# Patient Record
Sex: FEMALE | Race: WHITE | NOT HISPANIC OR LATINO | Employment: OTHER | URBAN - METROPOLITAN AREA
[De-identification: names, ages, dates, MRNs, and addresses within clinical notes are randomized per-mention and may not be internally consistent; named-entity substitution may affect disease eponyms.]

---

## 2018-07-19 ENCOUNTER — OFFICE VISIT (OUTPATIENT)
Dept: ORTHOPEDICS | Facility: CLINIC | Age: 68
End: 2018-07-19
Payer: MEDICARE

## 2018-07-19 VITALS
DIASTOLIC BLOOD PRESSURE: 70 MMHG | HEIGHT: 66 IN | WEIGHT: 152 LBS | SYSTOLIC BLOOD PRESSURE: 123 MMHG | HEART RATE: 78 BPM | BODY MASS INDEX: 24.43 KG/M2

## 2018-07-19 DIAGNOSIS — M75.41 IMPINGEMENT SYNDROME OF RIGHT SHOULDER: Primary | ICD-10-CM

## 2018-07-19 DIAGNOSIS — M17.12 PRIMARY OSTEOARTHRITIS OF LEFT KNEE: ICD-10-CM

## 2018-07-19 PROCEDURE — 73030 X-RAY EXAM OF SHOULDER: CPT | Mod: RT,,, | Performed by: ORTHOPAEDIC SURGERY

## 2018-07-19 PROCEDURE — 99213 OFFICE O/P EST LOW 20 MIN: CPT | Mod: 25,,, | Performed by: ORTHOPAEDIC SURGERY

## 2018-07-19 PROCEDURE — 20610 DRAIN/INJ JOINT/BURSA W/O US: CPT | Mod: 51,LT,, | Performed by: ORTHOPAEDIC SURGERY

## 2018-07-19 RX ORDER — METHYLPREDNISOLONE ACETATE 40 MG/ML
40 INJECTION, SUSPENSION INTRA-ARTICULAR; INTRALESIONAL; INTRAMUSCULAR; SOFT TISSUE
Status: DISCONTINUED | OUTPATIENT
Start: 2018-07-19 | End: 2018-07-19 | Stop reason: HOSPADM

## 2018-07-19 RX ORDER — LEVOTHYROXINE SODIUM 88 UG/1
TABLET ORAL
COMMUNITY
End: 2018-10-11 | Stop reason: SDUPTHER

## 2018-07-19 RX ORDER — ATORVASTATIN CALCIUM 20 MG/1
20 TABLET, FILM COATED ORAL DAILY
Refills: 1 | COMMUNITY
Start: 2018-07-15 | End: 2018-10-11 | Stop reason: SDUPTHER

## 2018-07-19 RX ADMIN — METHYLPREDNISOLONE ACETATE 40 MG: 40 INJECTION, SUSPENSION INTRA-ARTICULAR; INTRALESIONAL; INTRAMUSCULAR; SOFT TISSUE at 02:07

## 2018-07-19 NOTE — PROGRESS NOTES
[facility]    Subjective:     Chief Complaint:   Chief Complaint   Patient presents with    Left Knee - Pain     Left knee injection follow up. States that the injection did help and that the last few weeks the pain started to get worse again. States that her pain is 3      Right Shoulder - Pain     Right shoulder pain x 6 months. States that it bothers her when she goes to reach above her head and rotate outwards. States that her pain is a 4-5 when she has It       Past Medical History:   Diagnosis Date    Hypercholesterolemia     Thyroid disease        Past Surgical History:   Procedure Laterality Date    APPENDECTOMY      breast augmentation      HYSTERECTOMY         Current Outpatient Prescriptions   Medication Sig    atorvastatin (LIPITOR) 20 MG tablet Take 20 mg by mouth once daily.    levothyroxine (SYNTHROID) 88 MCG tablet levothyroxine 88 mcg tablet     No current facility-administered medications for this visit.        Review of patient's allergies indicates:  Allergies no known allergies    History reviewed. No pertinent family history.    Social History     Social History    Marital status:      Spouse name: N/A    Number of children: N/A    Years of education: N/A     Occupational History    Not on file.     Social History Main Topics    Smoking status: Never Smoker    Smokeless tobacco: Not on file    Alcohol use Yes    Drug use: Unknown    Sexual activity: Not on file     Other Topics Concern    Not on file     Social History Narrative    No narrative on file       History of present illness: Patient comes in today for the left knee. She was comes in for the right shoulder. Both been bothering her for long time. Right shoulders been bothering her for over a year.      Review of Systems:    Constitution: Negative for chills, fever, and sweats.  Negative for unexplained weight loss.    HENT:  Negative for headaches and blurry vision.    Cardiovascular:Negative for  chest pain or irregular heart beat. Negative for hypertension.    Respiratory:  Negative for cough and shortness of breath.    Gastrointestinal: Negative for abdominal pain, heartburn, melena, nausea, and vomitting.    Genitourinary:  Negative bladder incontinence and dysuria.    Musculoskeletal:  See HPI for details.     Neurological: Negative for numbness.    Psychiatric/Behavioral: Negative for depression.  The patient is not nervous/anxious.      Endocrine: Negative for polyuria    Hematologic/Lymphatic: Negative for bleeding problem.  Does not bruise/bleed easily.    Skin: Negative for poor would healing and rash    Objective:      Physical Examination:    Vital Signs:    Vitals:    07/19/18 1407   BP: 123/70   Pulse: 78       Body mass index is 24.53 kg/m².    This a well-developed, well nourished patient in no acute distress.  They are alert and oriented and cooperative to examination.        Patient has positive impingement on the right time. Negative crossover. Her rotator cuff is grossly intact but very tender. It impingement on the left shoulder. Her rotator cuff is intact. Spurling sign is negative. Full range of motion of the left knee. Anterior crepitus. 1+ effusion.  Pertinent New Results:    XRAY Report / Interpretation:   AP and lateral of the right shoulder demonstrates a type II acromion.    Assessment/Plan:      Subacromial impingement. I injected the right shoulder with Depo-Medrol and lidocaine. Started her on a strengthening program. In terms of the left knee I injected the left knee with Depo-Medrol and lidocaine. She has osteoarthritis of the left knee. She is an excellent candidate for Visco supplementation try to get Synvisc approved for her. We will see her back for that injection      This note was created using Dragon voice recognition software that occasionally misinterpreted phrases or words.

## 2018-07-19 NOTE — PROCEDURES
Large Joint Aspiration/Injection  Date/Time: 7/19/2018 2:31 PM  Performed by: JANIA CORDERO  Authorized by: JANIA CORDERO     Consent Done?:  Yes (Verbal)  Indications:  Pain  Procedure site marked: Yes    Timeout: Prior to procedure the correct patient, procedure, and site was verified      Location:  Knee  Site:  L knee  Prep: Patient was prepped and draped in usual sterile fashion    Needle size:  25 G  Medications:  40 mg methylPREDNISolone acetate 40 mg/mL; 40 mg methylPREDNISolone acetate 40 mg/mL  Patient tolerance:  Patient tolerated the procedure well with no immediate complications  Large Joint Aspiration/Injection  Date/Time: 7/19/2018 2:32 PM  Performed by: JANIA CORDERO  Authorized by: JANIA CORDERO     Consent Done?:  Yes (Verbal)  Indications:  Pain  Procedure site marked: Yes    Timeout: Prior to procedure the correct patient, procedure, and site was verified      Location:  Shoulder  Site:  R subacromial bursa  Prep: Patient was prepped and draped in usual sterile fashion    Needle size:  25 G  Medications:  40 mg methylPREDNISolone acetate 40 mg/mL; 40 mg methylPREDNISolone acetate 40 mg/mL  Patient tolerance:  Patient tolerated the procedure well with no immediate complications

## 2018-07-31 ENCOUNTER — TELEPHONE (OUTPATIENT)
Dept: ORTHOPEDICS | Facility: CLINIC | Age: 68
End: 2018-07-31

## 2018-07-31 NOTE — TELEPHONE ENCOUNTER
----- Message from Marquis Ray sent at 7/31/2018  9:53 AM CDT -----  Patient called to see if she has been approved for synvisc, please call patient when you get a chance

## 2018-08-03 ENCOUNTER — TELEPHONE (OUTPATIENT)
Dept: ORTHOPEDICS | Facility: CLINIC | Age: 68
End: 2018-08-03

## 2018-08-03 NOTE — TELEPHONE ENCOUNTER
----- Message from Sachi Ramon sent at 8/2/2018  8:42 AM CDT -----  Contact: Osorio with [facility] [phone]  Osorio sent fax [phone]) requesting info for Synvisc injection.

## 2018-08-09 ENCOUNTER — OFFICE VISIT (OUTPATIENT)
Dept: ORTHOPEDICS | Facility: CLINIC | Age: 68
End: 2018-08-09
Payer: MEDICARE

## 2018-08-09 VITALS
DIASTOLIC BLOOD PRESSURE: 60 MMHG | WEIGHT: 153 LBS | HEIGHT: 66 IN | BODY MASS INDEX: 24.59 KG/M2 | SYSTOLIC BLOOD PRESSURE: 110 MMHG | HEART RATE: 71 BPM

## 2018-08-09 DIAGNOSIS — M17.12 PRIMARY OSTEOARTHRITIS OF LEFT KNEE: Primary | ICD-10-CM

## 2018-08-09 PROCEDURE — 20610 DRAIN/INJ JOINT/BURSA W/O US: CPT | Mod: LT,,, | Performed by: ORTHOPAEDIC SURGERY

## 2018-08-09 PROCEDURE — 99213 OFFICE O/P EST LOW 20 MIN: CPT | Mod: 25,,, | Performed by: ORTHOPAEDIC SURGERY

## 2018-08-09 NOTE — PROGRESS NOTES
[facility]    Subjective:     Chief Complaint:   Chief Complaint   Patient presents with    Left Knee - Pain     Here for Left Knee Synvisc injection today       Past Medical History:   Diagnosis Date    Hypercholesterolemia     Thyroid disease        Past Surgical History:   Procedure Laterality Date    APPENDECTOMY      breast augmentation      HYSTERECTOMY         Current Outpatient Prescriptions   Medication Sig    atorvastatin (LIPITOR) 20 MG tablet Take 20 mg by mouth once daily.    levothyroxine (SYNTHROID) 88 MCG tablet levothyroxine 88 mcg tablet     No current facility-administered medications for this visit.        Review of patient's allergies indicates:  No Known Allergies    History reviewed. No pertinent family history.    Social History     Social History    Marital status:      Spouse name: N/A    Number of children: N/A    Years of education: N/A     Occupational History    Not on file.     Social History Main Topics    Smoking status: Never Smoker    Smokeless tobacco: Not on file    Alcohol use Yes    Drug use: Unknown    Sexual activity: Not on file     Other Topics Concern    Not on file     Social History Narrative    No narrative on file       History of present illness: Patient returns today for her left knee. She is here for Synvisc one.      Review of Systems:    Constitution: Negative for chills, fever, and sweats.  Negative for unexplained weight loss.    HENT:  Negative for headaches and blurry vision.    Cardiovascular:Negative for chest pain or irregular heart beat. Negative for hypertension.    Respiratory:  Negative for cough and shortness of breath.    Gastrointestinal: Negative for abdominal pain, heartburn, melena, nausea, and vomitting.    Genitourinary:  Negative bladder incontinence and dysuria.    Musculoskeletal:  See HPI for details.     Neurological: Negative for numbness.    Psychiatric/Behavioral: Negative for depression.  The  patient is not nervous/anxious.      Endocrine: Negative for polyuria    Hematologic/Lymphatic: Negative for bleeding problem.  Does not bruise/bleed easily.    Skin: Negative for poor would healing and rash    Objective:      Physical Examination:    Vital Signs:    Vitals:    08/09/18 1332   BP: 110/60   Pulse: 71       Body mass index is 24.69 kg/m².    This a well-developed, well nourished patient in no acute distress.  They are alert and oriented and cooperative to examination.        Patient has no crepitus. She has no effusion. She has full range of motion of the left and right knee  Pertinent New Results:    XRAY Report / Interpretation:   No new XRAYS Today.    Assessment/Plan:      Osteoarthritis of the left knee. Synvisc 1 is given without difficulty or complaints. Cautions and precautions are discussed with the patient in extensive detail. She will follow-up when necessary      This note was created using Dragon voice recognition software that occasionally misinterpreted phrases or words.

## 2018-08-09 NOTE — PROCEDURES
Large Joint Aspiration/Injection  Date/Time: 8/9/2018 1:43 PM  Performed by: JANIA CORDERO  Authorized by: JANIA CORDERO     Consent Done?:  Yes (Verbal)  Indications:  Pain  Procedure site marked: Yes    Timeout: Prior to procedure the correct patient, procedure, and site was verified      Location:  Knee  Site:  L knee  Prep: Patient was prepped and draped in usual sterile fashion    Ultrasonic Guidance for needle placement: No  Needle size:  25 G  Medications:  48 mg hylan g-f 20 48 mg/6 mL  Patient tolerance:  Patient tolerated the procedure well with no immediate complications

## 2018-08-14 RX ORDER — METHYLPREDNISOLONE ACETATE 40 MG/ML
40 INJECTION, SUSPENSION INTRA-ARTICULAR; INTRALESIONAL; INTRAMUSCULAR; SOFT TISSUE
Status: COMPLETED | OUTPATIENT
Start: 2018-08-14 | End: 2018-08-14

## 2018-08-14 RX ADMIN — METHYLPREDNISOLONE ACETATE 40 MG: 40 INJECTION, SUSPENSION INTRA-ARTICULAR; INTRALESIONAL; INTRAMUSCULAR; SOFT TISSUE at 10:08

## 2018-12-12 ENCOUNTER — TELEPHONE (OUTPATIENT)
Dept: ORTHOPEDICS | Facility: CLINIC | Age: 68
End: 2018-12-12

## 2018-12-12 NOTE — TELEPHONE ENCOUNTER
Spoke with pt, she wanted to know if Dr. Gunter and Dr. Doyle would do two surgeries on her at the same time. I explained that Dr Gunter has done this but she would have to speak with Dr. Gunter when she comes in next week and see if we can work that out for her.

## 2018-12-20 ENCOUNTER — OFFICE VISIT (OUTPATIENT)
Dept: ORTHOPEDICS | Facility: CLINIC | Age: 68
End: 2018-12-20
Payer: MEDICARE

## 2018-12-20 VITALS
BODY MASS INDEX: 24.59 KG/M2 | WEIGHT: 153 LBS | HEART RATE: 82 BPM | DIASTOLIC BLOOD PRESSURE: 70 MMHG | SYSTOLIC BLOOD PRESSURE: 108 MMHG | HEIGHT: 66 IN

## 2018-12-20 DIAGNOSIS — M17.12 PRIMARY OSTEOARTHRITIS OF LEFT KNEE: Primary | ICD-10-CM

## 2018-12-20 PROCEDURE — 1101F PT FALLS ASSESS-DOCD LE1/YR: CPT | Mod: ,,, | Performed by: ORTHOPAEDIC SURGERY

## 2018-12-20 PROCEDURE — 99213 OFFICE O/P EST LOW 20 MIN: CPT | Mod: 25,,, | Performed by: ORTHOPAEDIC SURGERY

## 2018-12-20 PROCEDURE — 20610 DRAIN/INJ JOINT/BURSA W/O US: CPT | Mod: LT,,, | Performed by: ORTHOPAEDIC SURGERY

## 2018-12-20 PROCEDURE — 73562 X-RAY EXAM OF KNEE 3: CPT | Mod: LT,,, | Performed by: ORTHOPAEDIC SURGERY

## 2018-12-20 RX ORDER — METHYLPREDNISOLONE ACETATE 40 MG/ML
40 INJECTION, SUSPENSION INTRA-ARTICULAR; INTRALESIONAL; INTRAMUSCULAR; SOFT TISSUE
Status: DISCONTINUED | OUTPATIENT
Start: 2018-12-20 | End: 2018-12-20 | Stop reason: HOSPADM

## 2018-12-20 RX ADMIN — METHYLPREDNISOLONE ACETATE 40 MG: 40 INJECTION, SUSPENSION INTRA-ARTICULAR; INTRALESIONAL; INTRAMUSCULAR; SOFT TISSUE at 12:12

## 2018-12-20 NOTE — PROGRESS NOTES
[facility]    Subjective:     Chief Complaint:   Chief Complaint   Patient presents with    Left Knee - Pain     Left knee Synvisc  injection  8-9. She is here for another injection       Past Medical History:   Diagnosis Date    Hypercholesterolemia     Thyroid disease        Past Surgical History:   Procedure Laterality Date    APPENDECTOMY      breast augmentation      HYSTERECTOMY         Current Outpatient Medications   Medication Sig    atorvastatin (LIPITOR) 20 MG tablet TAKE 1 TABLET EVERY DAY    levothyroxine (SYNTHROID) 88 MCG tablet TAKE 1 TABLET EVERY DAY     No current facility-administered medications for this visit.        Review of patient's allergies indicates:  No Known Allergies    History reviewed. No pertinent family history.    Social History     Socioeconomic History    Marital status:      Spouse name: Not on file    Number of children: Not on file    Years of education: Not on file    Highest education level: Not on file   Social Needs    Financial resource strain: Not on file    Food insecurity - worry: Not on file    Food insecurity - inability: Not on file    Transportation needs - medical: Not on file    Transportation needs - non-medical: Not on file   Occupational History    Not on file   Tobacco Use    Smoking status: Never Smoker   Substance and Sexual Activity    Alcohol use: Yes    Drug use: Not on file    Sexual activity: Not on file   Other Topics Concern    Not on file   Social History Narrative    Not on file       History of present illness: Patient comes in today for the left knee. She's having achy pain in the left knee.      Review of Systems:    Constitution: Negative for chills, fever, and sweats.  Negative for unexplained weight loss.    HENT:  Negative for headaches and blurry vision.    Cardiovascular:Negative for chest pain or irregular heart beat. Negative for hypertension.    Respiratory:  Negative for cough and shortness  of breath.    Gastrointestinal: Negative for abdominal pain, heartburn, melena, nausea, and vomitting.    Genitourinary:  Negative bladder incontinence and dysuria.    Musculoskeletal:  See HPI for details.     Neurological: Negative for numbness.    Psychiatric/Behavioral: Negative for depression.  The patient is not nervous/anxious.      Endocrine: Negative for polyuria    Hematologic/Lymphatic: Negative for bleeding problem.  Does not bruise/bleed easily.    Skin: Negative for poor would healing and rash    Objective:      Physical Examination:    Vital Signs:    Vitals:    12/20/18 1126   BP: 108/70   Pulse: 82       Body mass index is 24.69 kg/m².    This a well-developed, well nourished patient in no acute distress.  They are alert and oriented and cooperative to examination.        Patient has anterior crepitus. She has 1+ effusion. She is medial joint line tenderness. She is no crepitus on the left side.  Pertinent New Results:    XRAY Report / Interpretation:   AP lateral and sunrise views of the left knee demonstrates 3 compartment spurring. Moderate loss of medial compartment    Assessment/Plan:      Osteoarthrosis left knee. I injected the left knee with Depo-Medrol and lidocaine. She will follow-up when necessary  This note was created using Dragon voice recognition software that occasionally misinterpreted phrases or words.

## 2018-12-26 LAB
ALBUMIN SERPL-MCNC: 3.6 G/DL (ref 3.1–4.7)
ALP SERPL-CCNC: 58 IU/L (ref 40–104)
ALT (SGPT): 18 IU/L (ref 3–33)
AST SERPL-CCNC: 22 IU/L (ref 10–40)
BILIRUB SERPL-MCNC: 0.6 MG/DL (ref 0.3–1)
BUN SERPL-MCNC: 22 MG/DL (ref 8–20)
CALCIUM SERPL-MCNC: 9.5 MG/DL (ref 7.7–10.4)
CHLORIDE: 105 MMOL/L (ref 98–110)
CO2 SERPL-SCNC: 29.3 MMOL/L (ref 22.8–31.6)
CREATININE: 0.65 MG/DL (ref 0.6–1.4)
GLUCOSE: 97 MG/DL (ref 70–99)
HCT VFR BLD AUTO: 40.9 % (ref 36–48)
HGB BLD-MCNC: 12.9 G/DL (ref 12–15)
MCH RBC QN AUTO: 27.6 PG (ref 25–35)
MCHC RBC AUTO-ENTMCNC: 31.5 G/DL (ref 31–36)
MCV RBC AUTO: 87.4 FL (ref 79–98)
NUCLEATED RBCS: 0 %
PLATELET # BLD AUTO: 249 K/UL (ref 140–440)
POTASSIUM SERPL-SCNC: 4.1 MMOL/L (ref 3.5–5)
PROT SERPL-MCNC: 6.9 G/DL (ref 6–8.2)
RBC # BLD AUTO: 4.68 M/UL (ref 3.5–5.5)
SODIUM: 141 MMOL/L (ref 134–144)
WBC # BLD AUTO: 9 K/UL (ref 5–10)

## 2019-01-08 ENCOUNTER — TELEPHONE (OUTPATIENT)
Dept: PODIATRY | Facility: CLINIC | Age: 69
End: 2019-01-08

## 2019-01-15 ENCOUNTER — OFFICE VISIT (OUTPATIENT)
Dept: PODIATRY | Facility: CLINIC | Age: 69
End: 2019-01-15
Payer: MEDICARE

## 2019-01-15 VITALS
HEART RATE: 77 BPM | TEMPERATURE: 99 F | HEIGHT: 66 IN | SYSTOLIC BLOOD PRESSURE: 128 MMHG | DIASTOLIC BLOOD PRESSURE: 67 MMHG | WEIGHT: 153 LBS | BODY MASS INDEX: 24.59 KG/M2

## 2019-01-15 DIAGNOSIS — M20.22 HALLUX RIGIDUS OF LEFT FOOT: Primary | ICD-10-CM

## 2019-01-15 PROCEDURE — 99024 PR POST-OP FOLLOW-UP VISIT: ICD-10-PCS | Mod: ,,, | Performed by: PODIATRIST

## 2019-01-15 PROCEDURE — 99024 POSTOP FOLLOW-UP VISIT: CPT | Mod: ,,, | Performed by: PODIATRIST

## 2019-01-15 NOTE — PROGRESS NOTES
[address] [facility] [address]  [place] [address]  Phone: [phone]   [phone]    Patient's PCP:Rubens Tinajero III, MD  Referring Provider:No ref. provider found    Subjective:      Chief Complaint: Post-Op    Date of Surgery: 01/09/2019  Procedure: Cheilectomy first metatarsal phalangeal joint left foot    HPI:   Olivia Mix is a 69 y.o. female who returns to the clinic today for her 1st postop visit.  Olivia Mix rates pain a 3/10 on a pain scale.   Vitals:    01/15/19 1418   BP: 128/67   Pulse: 77   Temp: 98.6 °F (37 °C)       Past Surgical History:   Procedure Laterality Date    APPENDECTOMY      breast augmentation      CHEILECTOMY      HYSTERECTOMY       Past Medical History:   Diagnosis Date    Hypercholesterolemia     Thyroid disease      History reviewed. No pertinent family history.     Social History:   Marital Status:   Alcohol History:  reports that she drinks alcohol.  Tobacco History:  reports that  has never smoked. She does not have any smokeless tobacco history on file.  Drug History:  has no drug history on file.    Review of patient's allergies indicates:  No Known Allergies    Current Outpatient Medications   Medication Sig Dispense Refill    atorvastatin (LIPITOR) 20 MG tablet TAKE 1 TABLET EVERY DAY 90 tablet 1    levothyroxine (SYNTHROID) 88 MCG tablet TAKE 1 TABLET EVERY DAY 90 tablet 1     No current facility-administered medications for this visit.        Review of Systems   Constitutional: Positive for unexpected weight change. Negative for chills, fatigue and fever.   HENT: Negative for hearing loss and trouble swallowing.    Eyes: Negative for photophobia and visual disturbance.   Respiratory: Negative for cough, shortness of breath and wheezing.    Cardiovascular: Negative for chest pain, palpitations and leg swelling.   Gastrointestinal: Negative for abdominal pain and nausea.   Genitourinary: Negative for dysuria and frequency.   Musculoskeletal:  Positive for arthralgias (arthritis in knee). Negative for back pain and joint swelling.   Skin: Negative for rash.   Neurological: Negative for tremors, seizures, weakness, numbness and headaches.   Hematological: Bruises/bleeds easily.         Objective:        Post-op surgery of the cheilectomy of the left first MPJ with normal healing, no signs of infection or dehiscence of wound. Hardware in place. Normal post op exam today. No redness, no drainage, no increase in local temperature, no significant swelling, sutures.steri-strips are intact.     Imaging:       Ortho/SPM Exam  Physical Exam        Assessment and Plan:     6 days status post cheilectomy left first MPJ with normal healing    Plan:  #1 surgical redress done today.  The surgical dressing was removed showing no signs of infection, excess edema or malalignment. A new dry dressing was applied and patient was instructed to leave dressing intact until next visit or until instructed to remove.   #2 continue ice and elevation gradually increase activity level. Ambulate with surgical shoe.  #3 return to see me in 7-9 days for Steri-Strip removal.        The surgical dressing was removed showing no signs of infection, excess edema or malalignment. A new dry dressing was applied and patient was instructed to leave dressing intact until next visit or until instructed to remove.     This note was created using Dragon voice recognition software that occasionally misinterpreted phrases or words.

## 2019-01-22 ENCOUNTER — OFFICE VISIT (OUTPATIENT)
Dept: PODIATRY | Facility: CLINIC | Age: 69
End: 2019-01-22
Payer: MEDICARE

## 2019-01-22 VITALS
DIASTOLIC BLOOD PRESSURE: 70 MMHG | HEART RATE: 73 BPM | BODY MASS INDEX: 24.59 KG/M2 | WEIGHT: 153 LBS | SYSTOLIC BLOOD PRESSURE: 147 MMHG | HEIGHT: 66 IN | TEMPERATURE: 98 F

## 2019-01-22 DIAGNOSIS — M20.22 HALLUX RIGIDUS, LEFT FOOT: Primary | ICD-10-CM

## 2019-01-22 PROCEDURE — 99024 POSTOP FOLLOW-UP VISIT: CPT | Mod: ,,, | Performed by: PODIATRIST

## 2019-01-22 PROCEDURE — 99024 PR POST-OP FOLLOW-UP VISIT: ICD-10-PCS | Mod: ,,, | Performed by: PODIATRIST

## 2019-01-22 NOTE — PROGRESS NOTES
[address] [facility] [address] [place] [address]  Phone: [phone]   [phone]    Patient's PCP:Rubens Tinajero III, MD  Referring Provider:No ref. provider found    Subjective:      Chief Complaint: Post-Op    Date of Surgery: 1-09-19  Procedure: Cheilectomy first MPJ left    HPI:   Olivia Mix is a 69 y.o. female who returns to the clinic today for her 2nd postop visit.  Olivia Mix rates pain a 0/10 on a pain scale.      Vitals:    01/22/19 0932   BP: (!) 147/70   Pulse: 73   Temp: 98.1 °F (36.7 °C)       Past Surgical History:   Procedure Laterality Date    APPENDECTOMY      breast augmentation      CHEILECTOMY      HYSTERECTOMY       Past Medical History:   Diagnosis Date    Hypercholesterolemia     Thyroid disease      History reviewed. No pertinent family history.     Social History:   Marital Status:   Alcohol History:  reports that she drinks alcohol.  Tobacco History:  reports that  has never smoked. She does not have any smokeless tobacco history on file.  Drug History:  has no drug history on file.    Review of patient's allergies indicates:  No Known Allergies    Current Outpatient Medications   Medication Sig Dispense Refill    atorvastatin (LIPITOR) 20 MG tablet TAKE 1 TABLET EVERY DAY 90 tablet 1    levothyroxine (SYNTHROID) 88 MCG tablet TAKE 1 TABLET EVERY DAY 90 tablet 1     No current facility-administered medications for this visit.        Review of Systems   Constitutional: Negative for chills, fatigue, fever and unexpected weight change.   HENT: Negative for hearing loss and trouble swallowing.    Eyes: Negative for photophobia and visual disturbance.   Respiratory: Negative for cough, shortness of breath and wheezing.    Cardiovascular: Negative for chest pain, palpitations and leg swelling.   Gastrointestinal: Negative for abdominal pain and nausea.   Genitourinary: Negative for dysuria and frequency.   Musculoskeletal: Negative for arthralgias, back pain and  joint swelling.   Skin: Negative for rash.   Neurological: Negative for tremors, seizures, weakness, numbness and headaches.   Hematological: Does not bruise/bleed easily.         Objective:        Post-op surgery of the 1st mpj left foot with normal healing, no signs of infection or dehiscence of wound. Hardware in place. Normal post op exam today. No redness, no drainage, no increase in local temperature, no significant swelling, sutures.steri-strips are intact.     Imaging:     Ortho/SPM Exam  Physical Exam       Assessment:       1. Hallux rigidus, left foot      Plan:   Hallux rigidus, left foot    The foot was evaluated today, Steri-Strips removed, patient continue surgical shoe for one more week and then transitioned the jogging shoe work on range of motion of first MPJ.  Follow-up in about 4 weeks (around 2/19/2019) for X-ray.    Procedures - None        This note was created using Dragon voice recognition software that occasionally misinterpreted phrases or words.

## 2019-02-15 ENCOUNTER — TELEPHONE (OUTPATIENT)
Dept: ORTHOPEDICS | Facility: CLINIC | Age: 69
End: 2019-02-15

## 2019-02-15 DIAGNOSIS — M17.12 PRIMARY OSTEOARTHRITIS OF LEFT KNEE: Primary | ICD-10-CM

## 2019-02-15 NOTE — TELEPHONE ENCOUNTER
Pt called she is ready to have the synvisc injection so when you get the approval please call her to schedule the appt pts# [phone]

## 2019-02-21 ENCOUNTER — TELEPHONE (OUTPATIENT)
Dept: ORTHOPEDICS | Facility: CLINIC | Age: 69
End: 2019-02-21

## 2019-02-21 ENCOUNTER — OFFICE VISIT (OUTPATIENT)
Dept: PODIATRY | Facility: CLINIC | Age: 69
End: 2019-02-21
Payer: MEDICARE

## 2019-02-21 VITALS
TEMPERATURE: 99 F | DIASTOLIC BLOOD PRESSURE: 84 MMHG | HEART RATE: 79 BPM | BODY MASS INDEX: 24.59 KG/M2 | HEIGHT: 66 IN | WEIGHT: 153 LBS | SYSTOLIC BLOOD PRESSURE: 134 MMHG

## 2019-02-21 DIAGNOSIS — M79.672 LEFT FOOT PAIN: ICD-10-CM

## 2019-02-21 DIAGNOSIS — M20.22 HALLUX RIGIDUS OF LEFT FOOT: Primary | ICD-10-CM

## 2019-02-21 PROCEDURE — 99024 PR POST-OP FOLLOW-UP VISIT: ICD-10-PCS | Mod: ,,, | Performed by: PODIATRIST

## 2019-02-21 PROCEDURE — 99024 POSTOP FOLLOW-UP VISIT: CPT | Mod: ,,, | Performed by: PODIATRIST

## 2019-02-21 NOTE — PROGRESS NOTES
[address] [facility] [address] [place] [address]  Phone: [phone]   [phone]    Patient's PCP:Rubens Tinajero III, MD  Referring Provider:No ref. provider found    Subjective:      Chief Complaint: Post-Op    Date of Surgery: 1/9/19 6 weeks ago  Procedure: Cheilectomy left first mpj    HPI:   Olivia Mix is a 69 y.o. female who returns to the clinic today for her 3rd postop visit.  Olivia Mix rates pain a 4/10 on a pain scale. Compliance of Care:  None    Vitals:    02/21/19 1343   BP: 134/84   Pulse: 79   Temp: 98.5 °F (36.9 °C)       Past Surgical History:   Procedure Laterality Date    APPENDECTOMY      breast augmentation      CHEILECTOMY      HYSTERECTOMY       Past Medical History:   Diagnosis Date    Hypercholesterolemia     Thyroid disease      History reviewed. No pertinent family history.     Social History:   Marital Status:   Alcohol History:  reports that she drinks alcohol.  Tobacco History:  reports that  has never smoked. She does not have any smokeless tobacco history on file.  Drug History:  has no drug history on file.    Review of patient's allergies indicates:  No Known Allergies    Current Outpatient Medications   Medication Sig Dispense Refill    atorvastatin (LIPITOR) 20 MG tablet TAKE 1 TABLET EVERY DAY 90 tablet 1    levothyroxine (SYNTHROID) 88 MCG tablet TAKE 1 TABLET EVERY DAY 90 tablet 1     No current facility-administered medications for this visit.        Review of Systems   Constitutional: Negative for chills, fatigue, fever and unexpected weight change.   HENT: Negative for hearing loss and trouble swallowing.    Eyes: Negative for photophobia and visual disturbance.   Respiratory: Negative for cough, shortness of breath and wheezing.    Cardiovascular: Negative for chest pain, palpitations and leg swelling.   Gastrointestinal: Negative for abdominal pain and nausea.   Genitourinary: Negative for dysuria and frequency.   Musculoskeletal: Positive  for arthralgias. Negative for back pain and joint swelling.   Skin: Negative for rash.   Neurological: Negative for tremors, seizures, weakness, numbness and headaches.   Hematological: Does not bruise/bleed easily.         Objective:        Post-op surgery of the cheilectomy left foot with normal healing, no signs of infection or dehiscence of wound. Hardware in place. Normal post op exam today. No redness, no drainage, no increase in local temperature, no significant swelling, sutures.steri-strips are intact.     Imaging:     Physical Exam:   Foot Exam  Physical Exam       Assessment:       1. Hallux rigidus of left foot    2. Left foot pain      Plan:   Hallux rigidus of left foot  -     X-Ray Foot Complete Left    Left foot pain  -     X-Ray Foot Complete Left    Gradually increase activity level as tolerated and patient is discharged today and will she has a problem.  No Follow-up on file.    Procedures - None    The surgical dressing was removed showing no signs of infection, excess edema or malalignment. A new dry dressing was applied and patient was instructed to leave dressing intact until next visit or until instructed to remove.     This note was created using Dragon voice recognition software that occasionally misinterpreted phrases or words.

## 2019-03-07 ENCOUNTER — OFFICE VISIT (OUTPATIENT)
Dept: ORTHOPEDICS | Facility: CLINIC | Age: 69
End: 2019-03-07
Payer: MEDICARE

## 2019-03-07 VITALS
WEIGHT: 153 LBS | HEART RATE: 71 BPM | BODY MASS INDEX: 24.59 KG/M2 | SYSTOLIC BLOOD PRESSURE: 110 MMHG | DIASTOLIC BLOOD PRESSURE: 70 MMHG | HEIGHT: 66 IN

## 2019-03-07 DIAGNOSIS — M16.12 PRIMARY OSTEOARTHRITIS OF LEFT HIP: Primary | ICD-10-CM

## 2019-03-07 DIAGNOSIS — M70.62 GREATER TROCHANTERIC BURSITIS OF LEFT HIP: ICD-10-CM

## 2019-03-07 DIAGNOSIS — M17.12 PRIMARY OSTEOARTHRITIS OF LEFT KNEE: ICD-10-CM

## 2019-03-07 PROCEDURE — 1101F PT FALLS ASSESS-DOCD LE1/YR: CPT | Mod: ,,, | Performed by: ORTHOPAEDIC SURGERY

## 2019-03-07 PROCEDURE — 20610 LARGE JOINT ASPIRATION/INJECTION: L KNEE: ICD-10-PCS | Mod: LT,,, | Performed by: ORTHOPAEDIC SURGERY

## 2019-03-07 PROCEDURE — 73502 X-RAY EXAM HIP UNI 2-3 VIEWS: CPT | Mod: ,,, | Performed by: ORTHOPAEDIC SURGERY

## 2019-03-07 PROCEDURE — 73502 PR X-RAY EXAM HIP UNI 2-3 VIEWS: ICD-10-PCS | Mod: ,,, | Performed by: ORTHOPAEDIC SURGERY

## 2019-03-07 PROCEDURE — 20610 DRAIN/INJ JOINT/BURSA W/O US: CPT | Mod: LT,,, | Performed by: ORTHOPAEDIC SURGERY

## 2019-03-07 PROCEDURE — 1101F PR PT FALLS ASSESS DOC 0-1 FALLS W/OUT INJ PAST YR: ICD-10-PCS | Mod: ,,, | Performed by: ORTHOPAEDIC SURGERY

## 2019-03-07 PROCEDURE — 99213 OFFICE O/P EST LOW 20 MIN: CPT | Mod: 25,,, | Performed by: ORTHOPAEDIC SURGERY

## 2019-03-07 PROCEDURE — 99213 PR OFFICE/OUTPT VISIT, EST, LEVL III, 20-29 MIN: ICD-10-PCS | Mod: 25,,, | Performed by: ORTHOPAEDIC SURGERY

## 2019-03-07 PROCEDURE — 20610 DRAIN/INJ JOINT/BURSA W/O US: CPT | Mod: 51,LT,, | Performed by: ORTHOPAEDIC SURGERY

## 2019-03-07 RX ORDER — METHYLPREDNISOLONE ACETATE 40 MG/ML
40 INJECTION, SUSPENSION INTRA-ARTICULAR; INTRALESIONAL; INTRAMUSCULAR; SOFT TISSUE
Status: DISCONTINUED | OUTPATIENT
Start: 2019-03-07 | End: 2019-03-07 | Stop reason: HOSPADM

## 2019-03-07 RX ADMIN — METHYLPREDNISOLONE ACETATE 40 MG: 40 INJECTION, SUSPENSION INTRA-ARTICULAR; INTRALESIONAL; INTRAMUSCULAR; SOFT TISSUE at 09:03

## 2019-03-07 NOTE — PROGRESS NOTES
[facility]    Subjective:     Chief Complaint:   Chief Complaint   Patient presents with    Left Hip - Pain     Left hip pain x years. She would like injection    Left Knee - Pain     Left knee synvisc       Past Medical History:   Diagnosis Date    Hypercholesterolemia     Thyroid disease        Past Surgical History:   Procedure Laterality Date    APPENDECTOMY      breast augmentation      CHEILECTOMY      HYSTERECTOMY         Current Outpatient Medications   Medication Sig    atorvastatin (LIPITOR) 20 MG tablet TAKE 1 TABLET EVERY DAY    levothyroxine (SYNTHROID) 88 MCG tablet TAKE 1 TABLET EVERY DAY     No current facility-administered medications for this visit.        Review of patient's allergies indicates:  No Known Allergies    History reviewed. No pertinent family history.    Social History     Socioeconomic History    Marital status:      Spouse name: Not on file    Number of children: Not on file    Years of education: Not on file    Highest education level: Not on file   Social Needs    Financial resource strain: Not on file    Food insecurity - worry: Not on file    Food insecurity - inability: Not on file    Transportation needs - medical: Not on file    Transportation needs - non-medical: Not on file   Occupational History    Not on file   Tobacco Use    Smoking status: Never Smoker   Substance and Sexual Activity    Alcohol use: Yes    Drug use: Not on file    Sexual activity: Not on file   Other Topics Concern    Not on file   Social History Narrative    Not on file       History of present illness: Patient comes in today for the left hip and knee. The left hip is bothering her on the lateral aspect of the hip and also the groin. The left knee bothers her continually. She has had Synvisc in the past and that's worked well for her. She's here for Synvisc into the left knee. She is also here for her left hip evaluation.      Review of  Systems:    Constitution: Negative for chills, fever, and sweats.  Negative for unexplained weight loss.    HENT:  Negative for headaches and blurry vision.    Cardiovascular:Negative for chest pain or irregular heart beat. Negative for hypertension.    Respiratory:  Negative for cough and shortness of breath.    Gastrointestinal: Negative for abdominal pain, heartburn, melena, nausea, and vomitting.    Genitourinary:  Negative bladder incontinence and dysuria.    Musculoskeletal:  See HPI for details.     Neurological: Negative for numbness.    Psychiatric/Behavioral: Negative for depression.  The patient is not nervous/anxious.      Endocrine: Negative for polyuria    Hematologic/Lymphatic: Negative for bleeding problem.  Does not bruise/bleed easily.    Skin: Negative for poor would healing and rash    Objective:      Physical Examination:    Vital Signs:    Vitals:    03/07/19 0901   BP: 110/70   Pulse: 71       Body mass index is 24.69 kg/m².    This a well-developed, well nourished patient in no acute distress.  They are alert and oriented and cooperative to examination.        Patient has pain with internal/external rotation of the left hip. The pain is in the groin. She is also very tender of the trochanteric bursa. She does have full range of motion of the left hip. She has full range of motion of the right hip. Straight leg raises are negative. She has 1+ effusion on the left knee. No effusion on the right knee. She has crepitus the left knee.  Pertinent New Results:    XRAY Report / Interpretation:   AP and lateral of the left hip done in the office today demonstrates advanced arthritis of the left hip with loss of the left femoral acetabular joint space.   Assessment/Plan:      Advanced arthritis left hip and trochanteric bursitis left hip. I injected the left greater trochanter with Depo-Medrol and lidocaine. In terms of the left knee Synvisc 1 is administered without difficulty or complication.  Cautions and precautions discussed with her in great detail. She understood and wished to proceed    This note was created using Dragon voice recognition software that occasionally misinterpreted phrases or words.

## 2019-03-07 NOTE — PROCEDURES
Large Joint Aspiration/Injection: L knee  Date/Time: 3/7/2019 9:59 AM  Performed by: Boom Gunter MD  Authorized by: Boom Gunter MD     Consent Done?:  Yes (Verbal)  Indications:  Pain  Procedure site marked: Yes    Timeout: Prior to procedure the correct patient, procedure, and site was verified      Location:  Knee  Site:  L knee  Prep: Patient was prepped and draped in usual sterile fashion    Ultrasonic Guidance for needle placement: No  Needle size:  20 G  Medications:  48 mg hylan g-f 20 48 mg/6 mL  Patient tolerance:  Patient tolerated the procedure well with no immediate complications  Large Joint Aspiration/Injection: L greater trochanteric bursa  Date/Time: 3/7/2019 9:59 AM  Performed by: Boom Gunter MD  Authorized by: Boom Gunter MD     Consent Done?:  Yes (Verbal)  Indications:  Pain  Procedure site marked: Yes    Timeout: Prior to procedure the correct patient, procedure, and site was verified      Location:  Hip  Site:  L greater trochanteric bursa  Prep: Patient was prepped and draped in usual sterile fashion    Needle size:  25 G  Medications:  40 mg methylPREDNISolone acetate 40 mg/mL; 40 mg methylPREDNISolone acetate 40 mg/mL  Patient tolerance:  Patient tolerated the procedure well with no immediate complications

## 2019-09-10 ENCOUNTER — TELEPHONE (OUTPATIENT)
Dept: ORTHOPEDICS | Facility: CLINIC | Age: 69
End: 2019-09-10

## 2019-09-10 NOTE — TELEPHONE ENCOUNTER
----- Message from Eryn Godinez sent at 9/10/2019  1:40 PM CDT -----  Contact: patient   Pt called earlier regarding wanting to have a synvisc injection, he is confused about the phone call she got earlier from you saying that she needs to make an appt to see Finger she wants to know why she will have to see him twice.    PTS # [phone]

## 2019-09-10 NOTE — TELEPHONE ENCOUNTER
----- Message from Eryn Godinez sent at 9/10/2019 10:32 AM CDT -----  Contact: patient   Pt called and wants to have a synvisc shot in her knee. she wants to start the process of getting it.      PTS # [phone]

## 2019-09-10 NOTE — TELEPHONE ENCOUNTER
Called-LM that she will need to call back and make an appointment to see Dr. Gunter before we can try and get this approved.

## 2019-09-17 ENCOUNTER — OFFICE VISIT (OUTPATIENT)
Dept: ORTHOPEDICS | Facility: CLINIC | Age: 69
End: 2019-09-17
Payer: MEDICARE

## 2019-09-17 VITALS
BODY MASS INDEX: 24.91 KG/M2 | WEIGHT: 155 LBS | SYSTOLIC BLOOD PRESSURE: 130 MMHG | DIASTOLIC BLOOD PRESSURE: 78 MMHG | HEIGHT: 66 IN | HEART RATE: 73 BPM

## 2019-09-17 DIAGNOSIS — M70.62 GREATER TROCHANTERIC BURSITIS OF LEFT HIP: ICD-10-CM

## 2019-09-17 DIAGNOSIS — M17.12 PRIMARY OSTEOARTHRITIS OF LEFT KNEE: Primary | ICD-10-CM

## 2019-09-17 PROCEDURE — 20610 LARGE JOINT ASPIRATION/INJECTION: L KNEE: ICD-10-PCS | Mod: LT,S$GLB,, | Performed by: ORTHOPAEDIC SURGERY

## 2019-09-17 PROCEDURE — 1101F PT FALLS ASSESS-DOCD LE1/YR: CPT | Mod: S$GLB,,, | Performed by: ORTHOPAEDIC SURGERY

## 2019-09-17 PROCEDURE — 99213 OFFICE O/P EST LOW 20 MIN: CPT | Mod: 25,S$GLB,, | Performed by: ORTHOPAEDIC SURGERY

## 2019-09-17 PROCEDURE — 1101F PR PT FALLS ASSESS DOC 0-1 FALLS W/OUT INJ PAST YR: ICD-10-PCS | Mod: S$GLB,,, | Performed by: ORTHOPAEDIC SURGERY

## 2019-09-17 PROCEDURE — 20610 DRAIN/INJ JOINT/BURSA W/O US: CPT | Mod: LT,S$GLB,, | Performed by: ORTHOPAEDIC SURGERY

## 2019-09-17 PROCEDURE — 99213 PR OFFICE/OUTPT VISIT, EST, LEVL III, 20-29 MIN: ICD-10-PCS | Mod: 25,S$GLB,, | Performed by: ORTHOPAEDIC SURGERY

## 2019-09-17 RX ORDER — METHYLPREDNISOLONE ACETATE 40 MG/ML
40 INJECTION, SUSPENSION INTRA-ARTICULAR; INTRALESIONAL; INTRAMUSCULAR; SOFT TISSUE
Status: DISCONTINUED | OUTPATIENT
Start: 2019-09-17 | End: 2019-09-17 | Stop reason: HOSPADM

## 2019-09-17 RX ADMIN — METHYLPREDNISOLONE ACETATE 40 MG: 40 INJECTION, SUSPENSION INTRA-ARTICULAR; INTRALESIONAL; INTRAMUSCULAR; SOFT TISSUE at 02:09

## 2019-09-17 NOTE — PROGRESS NOTES
[facility]    Subjective:     Chief Complaint:   Chief Complaint   Patient presents with    Left Knee - Pain      Lt Knee pain -wants to discuss synvisc injections and get injection today    Left Hip - Pain     She would like left hip injection today       Past Medical History:   Diagnosis Date    Hypercholesterolemia     Thyroid disease        Past Surgical History:   Procedure Laterality Date    APPENDECTOMY      breast augmentation      CHEILECTOMY      HYSTERECTOMY         Current Outpatient Medications   Medication Sig    atorvastatin (LIPITOR) 20 MG tablet TAKE 1 TABLET EVERY DAY    levothyroxine (SYNTHROID) 88 MCG tablet TAKE 1 TABLET EVERY DAY     No current facility-administered medications for this visit.        Review of patient's allergies indicates:  No Known Allergies    History reviewed. No pertinent family history.    Social History     Socioeconomic History    Marital status:      Spouse name: Not on file    Number of children: Not on file    Years of education: Not on file    Highest education level: Not on file   Occupational History    Not on file   Social Needs    Financial resource strain: Not on file    Food insecurity:     Worry: Not on file     Inability: Not on file    Transportation needs:     Medical: Not on file     Non-medical: Not on file   Tobacco Use    Smoking status: Never Smoker   Substance and Sexual Activity    Alcohol use: Yes    Drug use: Not on file    Sexual activity: Not on file   Lifestyle    Physical activity:     Days per week: Not on file     Minutes per session: Not on file    Stress: Not on file   Relationships    Social connections:     Talks on phone: Not on file     Gets together: Not on file     Attends Faith service: Not on file     Active member of club or organization: Not on file     Attends meetings of clubs or organizations: Not on file     Relationship status: Not on file   Other Topics Concern    Not on file    Social History Narrative    Not on file       History of present illness: Patient comes in today for the left knee and the left hip. The left knee is really been bothering her the most. The hip is actually doing well.      Review of Systems:    Constitution: Negative for chills, fever, and sweats.  Negative for unexplained weight loss.    HENT:  Negative for headaches and blurry vision.    Cardiovascular:Negative for chest pain or irregular heart beat. Negative for hypertension.    Respiratory:  Negative for cough and shortness of breath.    Gastrointestinal: Negative for abdominal pain, heartburn, melena, nausea, and vomitting.    Genitourinary:  Negative bladder incontinence and dysuria.    Musculoskeletal:  See HPI for details.     Neurological: Negative for numbness.    Psychiatric/Behavioral: Negative for depression.  The patient is not nervous/anxious.      Endocrine: Negative for polyuria    Hematologic/Lymphatic: Negative for bleeding problem.  Does not bruise/bleed easily.    Skin: Negative for poor would healing and rash    Objective:      Physical Examination:    Vital Signs:    Vitals:    09/17/19 1337   BP: 130/78   Pulse: 73       Body mass index is 25.02 kg/m².    This a well-developed, well nourished patient in no acute distress.  They are alert and oriented and cooperative to examination.        She's very tender over the anterior left knee. She is a lot of crepitus. She is 1+ effusion. Full range of motion of the left hip. Pain in the groin with internal and external rotation.  Pertinent New Results:    XRAY Report / Interpretation:   AP and frog-leg hip, pelvis demonstrates advanced arthritis of the left hip with loss of the femoral acetabular space.    AP lateral and sunrise views of the left knee demonstrate 3 compartment spurring indicating advanced arthritis of the left knee      Assessment/Plan:      Advanced arthritis left hip. She is not tickly symptomatic were not can intervene. In  terms of the left knee she has advanced arthritis. I injected the left knee with Depo-Medrol and lidocaine. All up when necessary      This note was created using Dragon voice recognition software that occasionally misinterpreted phrases or words.

## 2019-09-17 NOTE — PROCEDURES
Large Joint Aspiration/Injection: L knee  Date/Time: 9/17/2019 2:11 PM  Performed by: Boom Gunter MD  Authorized by: Boom Gunter MD     Consent Done?:  Yes (Verbal)  Indications:  Pain  Procedure site marked: Yes    Timeout: Prior to procedure the correct patient, procedure, and site was verified    Anesthesia  Local anesthesia used  Anesthetic: lidocaine 1% without epinephrine    Location:  Knee  Site:  L knee  Prep: Patient was prepped and draped in usual sterile fashion    Needle size:  25 G  Medications:  40 mg methylPREDNISolone acetate 40 mg/mL; 40 mg methylPREDNISolone acetate 40 mg/mL  Patient tolerance:  Patient tolerated the procedure well with no immediate complications

## 2019-11-14 ENCOUNTER — TELEPHONE (OUTPATIENT)
Dept: ORTHOPEDICS | Facility: CLINIC | Age: 69
End: 2019-11-14

## 2019-11-14 DIAGNOSIS — M17.12 PRIMARY OSTEOARTHRITIS OF LEFT KNEE: Primary | ICD-10-CM

## 2019-11-14 NOTE — TELEPHONE ENCOUNTER
----- Message from Sachi Ramon sent at 11/14/2019  2:05 PM CST -----  Contact: Patient [phone]  Patient needs approval for her Synvisc injection. Dr Gunter

## 2019-11-18 ENCOUNTER — TELEPHONE (OUTPATIENT)
Dept: ORTHOPEDICS | Facility: CLINIC | Age: 69
End: 2019-11-18

## 2019-11-18 NOTE — TELEPHONE ENCOUNTER
----- Message from Elida Young sent at 11/18/2019  2:15 PM CST -----  Contact: pt called   Patient has been waiting since last week for a call about approval for synvisc shot that is scheduled for Thursday   [phone]

## 2019-11-22 ENCOUNTER — TELEPHONE (OUTPATIENT)
Dept: ORTHOPEDICS | Facility: CLINIC | Age: 69
End: 2019-11-22

## 2019-11-22 NOTE — TELEPHONE ENCOUNTER
Called pt-LM Synvisc approved and to call the office to schedule an appointment after thanksgiving.

## 2019-12-12 ENCOUNTER — OFFICE VISIT (OUTPATIENT)
Dept: ORTHOPEDICS | Facility: CLINIC | Age: 69
End: 2019-12-12
Payer: MEDICARE

## 2019-12-12 VITALS
WEIGHT: 155 LBS | HEART RATE: 72 BPM | BODY MASS INDEX: 25.02 KG/M2 | SYSTOLIC BLOOD PRESSURE: 124 MMHG | DIASTOLIC BLOOD PRESSURE: 72 MMHG

## 2019-12-12 DIAGNOSIS — M17.12 PRIMARY OSTEOARTHRITIS OF LEFT KNEE: Primary | ICD-10-CM

## 2019-12-12 DIAGNOSIS — M17.11 PRIMARY OSTEOARTHRITIS OF RIGHT KNEE: ICD-10-CM

## 2019-12-12 PROCEDURE — 1101F PT FALLS ASSESS-DOCD LE1/YR: CPT | Mod: S$GLB,,, | Performed by: ORTHOPAEDIC SURGERY

## 2019-12-12 PROCEDURE — 1125F AMNT PAIN NOTED PAIN PRSNT: CPT | Mod: S$GLB,,, | Performed by: ORTHOPAEDIC SURGERY

## 2019-12-12 PROCEDURE — 20610 LARGE JOINT ASPIRATION/INJECTION: L KNEE: ICD-10-PCS | Mod: 50,S$GLB,, | Performed by: ORTHOPAEDIC SURGERY

## 2019-12-12 PROCEDURE — 99214 OFFICE O/P EST MOD 30 MIN: CPT | Mod: 25,S$GLB,, | Performed by: ORTHOPAEDIC SURGERY

## 2019-12-12 PROCEDURE — 20610 DRAIN/INJ JOINT/BURSA W/O US: CPT | Mod: 50,S$GLB,, | Performed by: ORTHOPAEDIC SURGERY

## 2019-12-12 PROCEDURE — 1159F MED LIST DOCD IN RCRD: CPT | Mod: S$GLB,,, | Performed by: ORTHOPAEDIC SURGERY

## 2019-12-12 PROCEDURE — 99214 PR OFFICE/OUTPT VISIT, EST, LEVL IV, 30-39 MIN: ICD-10-PCS | Mod: 25,S$GLB,, | Performed by: ORTHOPAEDIC SURGERY

## 2019-12-12 PROCEDURE — 1125F PR PAIN SEVERITY QUANTIFIED, PAIN PRESENT: ICD-10-PCS | Mod: S$GLB,,, | Performed by: ORTHOPAEDIC SURGERY

## 2019-12-12 PROCEDURE — 1101F PR PT FALLS ASSESS DOC 0-1 FALLS W/OUT INJ PAST YR: ICD-10-PCS | Mod: S$GLB,,, | Performed by: ORTHOPAEDIC SURGERY

## 2019-12-12 PROCEDURE — 1159F PR MEDICATION LIST DOCUMENTED IN MEDICAL RECORD: ICD-10-PCS | Mod: S$GLB,,, | Performed by: ORTHOPAEDIC SURGERY

## 2019-12-12 RX ORDER — METHYLPREDNISOLONE ACETATE 40 MG/ML
40 INJECTION, SUSPENSION INTRA-ARTICULAR; INTRALESIONAL; INTRAMUSCULAR; SOFT TISSUE
Status: DISCONTINUED | OUTPATIENT
Start: 2019-12-12 | End: 2019-12-12 | Stop reason: HOSPADM

## 2019-12-12 RX ADMIN — METHYLPREDNISOLONE ACETATE 40 MG: 40 INJECTION, SUSPENSION INTRA-ARTICULAR; INTRALESIONAL; INTRAMUSCULAR; SOFT TISSUE at 02:12

## 2019-12-12 NOTE — PROGRESS NOTES
[facility]    Subjective:     Chief Complaint:   Chief Complaint   Patient presents with    Left Knee - Pain     Left knee pain/ Synvisc injection today    Right Knee - Pain     Pain treated in past. She would like steroid injection today       Past Medical History:   Diagnosis Date    Hypercholesterolemia     Thyroid disease        Past Surgical History:   Procedure Laterality Date    APPENDECTOMY      breast augmentation      CHEILECTOMY      HYSTERECTOMY         Current Outpatient Medications   Medication Sig    atorvastatin (LIPITOR) 20 MG tablet TAKE 1 TABLET BY MOUTH ONCE DAILY    levothyroxine (SYNTHROID) 88 MCG tablet TAKE 1 TABLET BY MOUTH ONCE DAILY     No current facility-administered medications for this visit.        Review of patient's allergies indicates:  No Known Allergies    History reviewed. No pertinent family history.    Social History     Socioeconomic History    Marital status:      Spouse name: Not on file    Number of children: Not on file    Years of education: Not on file    Highest education level: Not on file   Occupational History    Not on file   Social Needs    Financial resource strain: Not on file    Food insecurity:     Worry: Not on file     Inability: Not on file    Transportation needs:     Medical: Not on file     Non-medical: Not on file   Tobacco Use    Smoking status: Never Smoker   Substance and Sexual Activity    Alcohol use: Yes    Drug use: Not on file    Sexual activity: Not on file   Lifestyle    Physical activity:     Days per week: Not on file     Minutes per session: Not on file    Stress: Not on file   Relationships    Social connections:     Talks on phone: Not on file     Gets together: Not on file     Attends Christianity service: Not on file     Active member of club or organization: Not on file     Attends meetings of clubs or organizations: Not on file     Relationship status: Not on file   Other Topics Concern    Not  on file   Social History Narrative    Not on file       History of present illness:  Patient comes in today for her bilateral knees.  She is here for viscosupplementation for the right knee.  The left knee has been aching and bothering her lately and she wants to get an injection in the left knee as well.      Review of Systems:    Constitution: Negative for chills, fever, and sweats.  Negative for unexplained weight loss.    HENT:  Negative for headaches and blurry vision.    Cardiovascular:Negative for chest pain or irregular heart beat. Negative for hypertension.    Respiratory:  Negative for cough and shortness of breath.    Gastrointestinal: Negative for abdominal pain, heartburn, melena, nausea, and vomitting.    Genitourinary:  Negative bladder incontinence and dysuria.    Musculoskeletal:  See HPI for details.     Neurological: Negative for numbness.    Psychiatric/Behavioral: Negative for depression.  The patient is not nervous/anxious.      Endocrine: Negative for polyuria    Hematologic/Lymphatic: Negative for bleeding problem.  Does not bruise/bleed easily.    Skin: Negative for poor would healing and rash    Objective:      Physical Examination:    Vital Signs:    Vitals:    12/12/19 1456   BP: 124/72   Pulse: 72       Body mass index is 25.02 kg/m².    This a well-developed, well nourished patient in no acute distress.  They are alert and oriented and cooperative to examination.        In terms the right knee she has varus deformity she has crepitus she has 1+ effusion she is stable knee to varus valgus stresses.  In terms of the left knee she has a varus deformity.  She has significant crepitus.  She has 1+ effusion.  Pertinent New Results:    XRAY Report / Interpretation:       Assessment/Plan:      Arthritis bilateral knees.  I injected the left knee with Synvisc One.  This is done under sterile conditions.  Post-injection care is discussed in great detail.  I injected the right knee with  Depo-Medrol and lidocaine under sterile conditions.  She will follow up p.r.n.      This note was created using Dragon voice recognition software that occasionally misinterpreted phrases or words.

## 2019-12-12 NOTE — PROCEDURES
Large Joint Aspiration/Injection: L knee  Date/Time: 12/12/2019 2:30 PM  Performed by: Boom Gunter MD  Authorized by: Boom Gunter MD     Consent Done?:  Yes (Verbal)  Indications:  Pain  Procedure site marked: Yes    Timeout: Prior to procedure the correct patient, procedure, and site was verified    Anesthesia  Local anesthesia used  Anesthetic: lidocaine 1% without epinephrine    Location:  Knee  Site:  L knee  Prep: Patient was prepped and draped in usual sterile fashion    Needle size:  25 G  Ultrasonic Guidance for needle placement: No  Medications:  48 mg hylan g-f 20 48 mg/6 mL  Patient tolerance:  Patient tolerated the procedure well with no immediate complications  Large Joint Aspiration/Injection: R knee  Date/Time: 12/12/2019 2:30 PM  Performed by: Boom Gunter MD  Authorized by: Boom Gunter MD     Consent Done?:  Yes (Verbal)  Indications:  Pain  Procedure site marked: Yes    Timeout: Prior to procedure the correct patient, procedure, and site was verified    Anesthesia  Local anesthesia used  Anesthetic: lidocaine 1% without epinephrine    Location:  Knee  Site:  R knee  Prep: Patient was prepped and draped in usual sterile fashion    Needle size:  25 G  Medications:  40 mg methylPREDNISolone acetate 40 mg/mL; 40 mg methylPREDNISolone acetate 40 mg/mL  Patient tolerance:  Patient tolerated the procedure well with no immediate complications

## 2019-12-17 ENCOUNTER — TELEPHONE (OUTPATIENT)
Dept: ORTHOPEDICS | Facility: CLINIC | Age: 69
End: 2019-12-17

## 2019-12-17 NOTE — TELEPHONE ENCOUNTER
----- Message from Sachi Ramon sent at 12/17/2019  9:11 AM CST -----  Contact: Patient [phone]  Please call patient she is having  a lot of pain after her Synvisc injection. Dr Gunter

## 2019-12-18 RX ORDER — MELOXICAM 7.5 MG/1
7.5 TABLET ORAL DAILY
COMMUNITY
Start: 2019-12-18 | End: 2020-01-09

## 2019-12-18 NOTE — TELEPHONE ENCOUNTER
----- Message from Tamia Judd sent at 12/18/2019 11:11 AM CST -----  Contact: patient  Dr ca pt-pts# [phone] she missed your call yesterday

## 2019-12-18 NOTE — TELEPHONE ENCOUNTER
Spoke with pt, still having some pain since her Synvisc. I explained it takes up to 6 weeks to work. Will call in some Mobic for her to help. Told to call back next week if not any better or if she gets worse

## 2020-01-09 RX ORDER — MELOXICAM 7.5 MG/1
TABLET ORAL
Qty: 30 TABLET | Refills: 0 | Status: SHIPPED | OUTPATIENT
Start: 2020-01-09 | End: 2020-06-25

## 2020-05-21 ENCOUNTER — OFFICE VISIT (OUTPATIENT)
Dept: ORTHOPEDICS | Facility: CLINIC | Age: 70
End: 2020-05-21
Payer: MEDICARE

## 2020-05-21 VITALS
WEIGHT: 155 LBS | HEART RATE: 70 BPM | HEIGHT: 66 IN | BODY MASS INDEX: 24.91 KG/M2 | DIASTOLIC BLOOD PRESSURE: 62 MMHG | SYSTOLIC BLOOD PRESSURE: 120 MMHG

## 2020-05-21 DIAGNOSIS — M17.11 PRIMARY OSTEOARTHRITIS OF RIGHT KNEE: ICD-10-CM

## 2020-05-21 DIAGNOSIS — M17.12 PRIMARY OSTEOARTHRITIS OF LEFT KNEE: Primary | ICD-10-CM

## 2020-05-21 PROCEDURE — 99213 OFFICE O/P EST LOW 20 MIN: CPT | Mod: 25,S$GLB,, | Performed by: PHYSICIAN ASSISTANT

## 2020-05-21 PROCEDURE — 1125F AMNT PAIN NOTED PAIN PRSNT: CPT | Mod: S$GLB,,, | Performed by: PHYSICIAN ASSISTANT

## 2020-05-21 PROCEDURE — 20610 DRAIN/INJ JOINT/BURSA W/O US: CPT | Mod: 50,S$GLB,, | Performed by: PHYSICIAN ASSISTANT

## 2020-05-21 PROCEDURE — 1101F PT FALLS ASSESS-DOCD LE1/YR: CPT | Mod: S$GLB,,, | Performed by: PHYSICIAN ASSISTANT

## 2020-05-21 PROCEDURE — 1159F MED LIST DOCD IN RCRD: CPT | Mod: S$GLB,,, | Performed by: PHYSICIAN ASSISTANT

## 2020-05-21 PROCEDURE — 99213 PR OFFICE/OUTPT VISIT, EST, LEVL III, 20-29 MIN: ICD-10-PCS | Mod: 25,S$GLB,, | Performed by: PHYSICIAN ASSISTANT

## 2020-05-21 PROCEDURE — 20610 LARGE JOINT ASPIRATION/INJECTION: R KNEE: ICD-10-PCS | Mod: 50,S$GLB,, | Performed by: PHYSICIAN ASSISTANT

## 2020-05-21 PROCEDURE — 1159F PR MEDICATION LIST DOCUMENTED IN MEDICAL RECORD: ICD-10-PCS | Mod: S$GLB,,, | Performed by: PHYSICIAN ASSISTANT

## 2020-05-21 PROCEDURE — 1125F PR PAIN SEVERITY QUANTIFIED, PAIN PRESENT: ICD-10-PCS | Mod: S$GLB,,, | Performed by: PHYSICIAN ASSISTANT

## 2020-05-21 PROCEDURE — 1101F PR PT FALLS ASSESS DOC 0-1 FALLS W/OUT INJ PAST YR: ICD-10-PCS | Mod: S$GLB,,, | Performed by: PHYSICIAN ASSISTANT

## 2020-05-21 RX ORDER — METHYLPREDNISOLONE ACETATE 40 MG/ML
40 INJECTION, SUSPENSION INTRA-ARTICULAR; INTRALESIONAL; INTRAMUSCULAR; SOFT TISSUE
Status: DISCONTINUED | OUTPATIENT
Start: 2020-05-21 | End: 2020-05-21 | Stop reason: HOSPADM

## 2020-05-21 RX ADMIN — METHYLPREDNISOLONE ACETATE 40 MG: 40 INJECTION, SUSPENSION INTRA-ARTICULAR; INTRALESIONAL; INTRAMUSCULAR; SOFT TISSUE at 09:05

## 2020-05-21 NOTE — PROGRESS NOTES
[facility]    Subjective:     Chief Complaint:   Chief Complaint   Patient presents with    Left Knee - Pain     Left knee pain x a while States that the injection she received in December ( Synvisc) helped very little she would like to get a steroid injection today.     Right Knee - Pain     Right knee pain x a while. States that the injection she received in December  ( Synvisc) helped very little she would like to get a steroid injection today.        Past Medical History:   Diagnosis Date    Hypercholesterolemia     Thyroid disease        Past Surgical History:   Procedure Laterality Date    APPENDECTOMY      breast augmentation      CHEILECTOMY      HYSTERECTOMY         Current Outpatient Medications   Medication Sig    atorvastatin (LIPITOR) 20 MG tablet TAKE 1 TABLET BY MOUTH ONCE DAILY    levothyroxine (SYNTHROID) 88 MCG tablet TAKE 1 TABLET BY MOUTH ONCE DAILY    meloxicam (MOBIC) 7.5 MG tablet TAKE 1 TABLET BY MOUTH DAILY (Patient not taking: Reported on 5/21/2020)     No current facility-administered medications for this visit.        Review of patient's allergies indicates:  No Known Allergies    History reviewed. No pertinent family history.    Social History     Socioeconomic History    Marital status:      Spouse name: Not on file    Number of children: Not on file    Years of education: Not on file    Highest education level: Not on file   Occupational History    Not on file   Social Needs    Financial resource strain: Not on file    Food insecurity:     Worry: Not on file     Inability: Not on file    Transportation needs:     Medical: Not on file     Non-medical: Not on file   Tobacco Use    Smoking status: Never Smoker   Substance and Sexual Activity    Alcohol use: Yes    Drug use: Not on file    Sexual activity: Not on file   Lifestyle    Physical activity:     Days per week: Not on file     Minutes per session: Not on file    Stress: Not on file    Relationships    Social connections:     Talks on phone: Not on file     Gets together: Not on file     Attends Sabianism service: Not on file     Active member of club or organization: Not on file     Attends meetings of clubs or organizations: Not on file     Relationship status: Not on file   Other Topics Concern    Not on file   Social History Narrative    Not on file       History of present illness:  Patient returns to the clinic today for bilateral knee pain.  The patient was last seen in December, she received Synvisc in her left knee which did not help at all, she received steroids in her right knee.  Her knees not hurt terribly however the gym is about opened again and she wishes to return to her routine exercise level.  She requests that we inject both knees today.      Review of Systems:    Constitution: Negative for chills, fever, and sweats.  Negative for unexplained weight loss.    HENT:  Negative for headaches and blurry vision.    Cardiovascular:Negative for chest pain or irregular heart beat. Negative for hypertension.    Respiratory:  Negative for cough and shortness of breath.    Gastrointestinal: Negative for abdominal pain, heartburn, melena, nausea, and vomitting.    Genitourinary:  Negative bladder incontinence and dysuria.    Musculoskeletal:  See HPI for details.     Neurological: Negative for numbness.    Psychiatric/Behavioral: Negative for depression.  The patient is not nervous/anxious.      Endocrine: Negative for polyuria    Hematologic/Lymphatic: Negative for bleeding problem.  Does not bruise/bleed easily.    Skin: Negative for poor would healing and rash    Objective:      Physical Examination:    Vital Signs:    Vitals:    05/21/20 0947   BP: 120/62   Pulse: 70       Body mass index is 25.02 kg/m².    This a well-developed, well nourished patient in no acute distress.  They are alert and oriented and cooperative to examination.        Examination of the bilateral knees, the  patient has significant patellofemoral crepitus, no significant pain with grind testing.  Stable to ligamentous testing.  Normal range of motion.  No large joint effusions.  Pertinent New Results:    XRAY Report / Interpretation:       Assessment/Plan:      Osteoarthritis bilateral knees, we injected both knees today with lidocaine and Depo-Medrol.  Will see her back p.r.n.      This note was created using Dragon voice recognition software that occasionally misinterpreted phrases or words.

## 2020-05-21 NOTE — PROCEDURES
Large Joint Aspiration/Injection: R knee  Date/Time: 5/21/2020 9:45 AM  Performed by: KASSIE Workman  Authorized by: KASSIE Workman     Consent Done?:  Yes (Verbal)  Indications:  Pain  Site marked: the procedure site was marked    Timeout: prior to procedure the correct patient, procedure, and site was verified    Prep: patient was prepped and draped in usual sterile fashion      Local anesthesia used?: Yes    Local anesthetic:  Lidocaine 1% without epinephrine    Details:  Needle Size:  25 G  Ultrasonic Guidance for needle placement?: No    Location:  Knee  Site:  R knee  Medications:  40 mg methylPREDNISolone acetate 40 mg/mL; 40 mg methylPREDNISolone acetate 40 mg/mL  Patient tolerance:  Patient tolerated the procedure well with no immediate complications  Large Joint Aspiration/Injection: L knee  Date/Time: 5/21/2020 9:45 AM  Performed by: KASSIE Workman  Authorized by: KASSIE Workman     Consent Done?:  Yes (Verbal)  Indications:  Pain  Site marked: the procedure site was marked    Timeout: prior to procedure the correct patient, procedure, and site was verified    Prep: patient was prepped and draped in usual sterile fashion      Local anesthesia used?: Yes    Local anesthetic:  Lidocaine 1% without epinephrine  Ultrasonic Guidance for needle placement?: No    Location:  Knee  Site:  L knee  Medications:  40 mg methylPREDNISolone acetate 40 mg/mL; 40 mg methylPREDNISolone acetate 40 mg/mL  Patient tolerance:  Patient tolerated the procedure well with no immediate complications

## 2020-06-24 DIAGNOSIS — Z12.31 VISIT FOR SCREENING MAMMOGRAM: Primary | ICD-10-CM

## 2020-07-01 ENCOUNTER — HOSPITAL ENCOUNTER (OUTPATIENT)
Dept: RADIOLOGY | Facility: HOSPITAL | Age: 70
Discharge: HOME | End: 2020-07-01
Attending: OBSTETRICS & GYNECOLOGY
Payer: MEDICARE

## 2020-07-01 VITALS — BODY MASS INDEX: 24.77 KG/M2 | WEIGHT: 154.13 LBS | HEIGHT: 66 IN

## 2020-07-01 DIAGNOSIS — Z12.31 VISIT FOR SCREENING MAMMOGRAM: ICD-10-CM

## 2020-07-01 PROCEDURE — 77067 SCR MAMMO BI INCL CAD: CPT | Mod: TC,PO

## 2020-07-28 PROBLEM — Z79.82 ASPIRIN LONG-TERM USE: Status: ACTIVE | Noted: 2020-07-28

## 2020-07-28 PROBLEM — N39.0 RECURRENT UTI: Status: ACTIVE | Noted: 2020-07-28

## 2020-09-10 ENCOUNTER — OFFICE VISIT (OUTPATIENT)
Dept: ORTHOPEDICS | Facility: CLINIC | Age: 70
End: 2020-09-10
Payer: MEDICARE

## 2020-09-10 VITALS
BODY MASS INDEX: 25.07 KG/M2 | SYSTOLIC BLOOD PRESSURE: 126 MMHG | DIASTOLIC BLOOD PRESSURE: 70 MMHG | HEART RATE: 80 BPM | WEIGHT: 156 LBS | HEIGHT: 66 IN

## 2020-09-10 DIAGNOSIS — M17.12 PRIMARY OSTEOARTHRITIS OF LEFT KNEE: Primary | ICD-10-CM

## 2020-09-10 DIAGNOSIS — M17.11 PRIMARY OSTEOARTHRITIS OF RIGHT KNEE: ICD-10-CM

## 2020-09-10 PROCEDURE — 3008F BODY MASS INDEX DOCD: CPT | Mod: S$GLB,,, | Performed by: ORTHOPAEDIC SURGERY

## 2020-09-10 PROCEDURE — 1125F PR PAIN SEVERITY QUANTIFIED, PAIN PRESENT: ICD-10-PCS | Mod: S$GLB,,, | Performed by: ORTHOPAEDIC SURGERY

## 2020-09-10 PROCEDURE — 99212 OFFICE O/P EST SF 10 MIN: CPT | Mod: 25,S$GLB,, | Performed by: ORTHOPAEDIC SURGERY

## 2020-09-10 PROCEDURE — 1101F PT FALLS ASSESS-DOCD LE1/YR: CPT | Mod: S$GLB,,, | Performed by: ORTHOPAEDIC SURGERY

## 2020-09-10 PROCEDURE — 1159F MED LIST DOCD IN RCRD: CPT | Mod: S$GLB,,, | Performed by: ORTHOPAEDIC SURGERY

## 2020-09-10 PROCEDURE — 1159F PR MEDICATION LIST DOCUMENTED IN MEDICAL RECORD: ICD-10-PCS | Mod: S$GLB,,, | Performed by: ORTHOPAEDIC SURGERY

## 2020-09-10 PROCEDURE — 3008F PR BODY MASS INDEX (BMI) DOCUMENTED: ICD-10-PCS | Mod: S$GLB,,, | Performed by: ORTHOPAEDIC SURGERY

## 2020-09-10 PROCEDURE — 20610 DRAIN/INJ JOINT/BURSA W/O US: CPT | Mod: 50,S$GLB,, | Performed by: ORTHOPAEDIC SURGERY

## 2020-09-10 PROCEDURE — 1101F PR PT FALLS ASSESS DOC 0-1 FALLS W/OUT INJ PAST YR: ICD-10-PCS | Mod: S$GLB,,, | Performed by: ORTHOPAEDIC SURGERY

## 2020-09-10 PROCEDURE — 20610 LARGE JOINT ASPIRATION/INJECTION: R KNEE: ICD-10-PCS | Mod: 50,S$GLB,, | Performed by: ORTHOPAEDIC SURGERY

## 2020-09-10 PROCEDURE — 99212 PR OFFICE/OUTPT VISIT, EST, LEVL II, 10-19 MIN: ICD-10-PCS | Mod: 25,S$GLB,, | Performed by: ORTHOPAEDIC SURGERY

## 2020-09-10 PROCEDURE — 1125F AMNT PAIN NOTED PAIN PRSNT: CPT | Mod: S$GLB,,, | Performed by: ORTHOPAEDIC SURGERY

## 2020-09-10 RX ORDER — METHYLPREDNISOLONE ACETATE 40 MG/ML
40 INJECTION, SUSPENSION INTRA-ARTICULAR; INTRALESIONAL; INTRAMUSCULAR; SOFT TISSUE
Status: DISCONTINUED | OUTPATIENT
Start: 2020-09-10 | End: 2020-09-10 | Stop reason: HOSPADM

## 2020-09-10 RX ADMIN — METHYLPREDNISOLONE ACETATE 40 MG: 40 INJECTION, SUSPENSION INTRA-ARTICULAR; INTRALESIONAL; INTRAMUSCULAR; SOFT TISSUE at 01:09

## 2020-09-10 NOTE — PROCEDURES
Large Joint Aspiration/Injection: R knee    Date/Time: 9/10/2020 1:45 PM  Performed by: Boom Gunter MD  Authorized by: Boom Gunter MD     Consent Done?:  Yes (Verbal)  Indications:  Pain  Site marked: the procedure site was marked    Timeout: prior to procedure the correct patient, procedure, and site was verified    Prep: patient was prepped and draped in usual sterile fashion      Local anesthesia used?: Yes    Local anesthetic:  Lidocaine 1% without epinephrine    Details:  Needle Size:  25 G  Ultrasonic Guidance for needle placement?: No    Location:  Knee  Site:  R knee  Medications:  40 mg methylPREDNISolone acetate 40 mg/mL  Patient tolerance:  Patient tolerated the procedure well with no immediate complications    Large Joint Aspiration/Injection: L knee    Date/Time: 9/10/2020 1:45 PM  Performed by: Boom Gunter MD  Authorized by: Boom Gunter MD     Consent Done?:  Yes (Verbal)  Indications:  Pain  Site marked: the procedure site was marked    Timeout: prior to procedure the correct patient, procedure, and site was verified    Prep: patient was prepped and draped in usual sterile fashion      Local anesthesia used?: Yes    Local anesthetic:  Lidocaine 1% without epinephrine    Details:  Needle Size:  25 G  Ultrasonic Guidance for needle placement?: No    Location:  Knee  Site:  L knee  Medications:  40 mg methylPREDNISolone acetate 40 mg/mL  Patient tolerance:  Patient tolerated the procedure well with no immediate complications

## 2020-09-10 NOTE — PROGRESS NOTES
[facility]    Subjective:     Chief Complaint:   Chief Complaint   Patient presents with    Left Knee - Pain     Left knee pain x a while. States that she was here in May and got and injeciton and states that the injection did help. Would like to get another one today.     Right Knee - Pain     Right knee pain x a while. States that she was here in May and got and injeciton and states that the injection did help. Would like to get another one today.        Past Medical History:   Diagnosis Date    Hypercholesterolemia     Thyroid disease        Past Surgical History:   Procedure Laterality Date    APPENDECTOMY      breast augmentation      CHEILECTOMY      HYSTERECTOMY         Current Outpatient Medications   Medication Sig    aspirin (ECOTRIN) 81 MG EC tablet Take 81 mg by mouth once daily.    atorvastatin (LIPITOR) 20 MG tablet TAKE 1 TABLET BY MOUTH ONCE DAILY    co-enzyme Q-10 30 mg capsule Take 30 mg by mouth 3 (three) times daily.    levothyroxine (SYNTHROID) 88 MCG tablet TAKE 1 TABLET BY MOUTH ONCE DAILY     No current facility-administered medications for this visit.        Review of patient's allergies indicates:  No Known Allergies    Family History   Problem Relation Age of Onset    Breast cancer Mother 82       Social History     Socioeconomic History    Marital status:      Spouse name: Not on file    Number of children: Not on file    Years of education: Not on file    Highest education level: Not on file   Occupational History    Not on file   Social Needs    Financial resource strain: Not on file    Food insecurity     Worry: Not on file     Inability: Not on file    Transportation needs     Medical: Not on file     Non-medical: Not on file   Tobacco Use    Smoking status: Never Smoker   Substance and Sexual Activity    Alcohol use: Yes    Drug use: Not on file    Sexual activity: Not on file   Lifestyle    Physical activity     Days per week: Not on file      Minutes per session: Not on file    Stress: Not at all   Relationships    Social connections     Talks on phone: Not on file     Gets together: Not on file     Attends Spiritism service: Not on file     Active member of club or organization: Not on file     Attends meetings of clubs or organizations: Not on file     Relationship status: Not on file   Other Topics Concern    Not on file   Social History Narrative    Not on file       History of present illness:  Patient comes in today for the bilateral knees.  She is requesting bilateral Depo-Medrol injections      Review of Systems:    Constitution: Negative for chills, fever, and sweats.  Negative for unexplained weight loss.    HENT:  Negative for headaches and blurry vision.    Cardiovascular:Negative for chest pain or irregular heart beat. Negative for hypertension.    Respiratory:  Negative for cough and shortness of breath.    Gastrointestinal: Negative for abdominal pain, heartburn, melena, nausea, and vomitting.    Genitourinary:  Negative bladder incontinence and dysuria.    Musculoskeletal:  See HPI for details.     Neurological: Negative for numbness.    Psychiatric/Behavioral: Negative for depression.  The patient is not nervous/anxious.      Endocrine: Negative for polyuria    Hematologic/Lymphatic: Negative for bleeding problem.  Does not bruise/bleed easily.    Skin: Negative for poor would healing and rash    Objective:      Physical Examination:    Vital Signs:    Vitals:    09/10/20 1350   BP: 126/70   Pulse: 80       Body mass index is 25.18 kg/m².    This a well-developed, well nourished patient in no acute distress.  They are alert and oriented and cooperative to examination.        Patient is range of motion 0-95 degrees bilaterally.  She has bilateral crepitus  Pertinent New Results:    XRAY Report / Interpretation:       Assessment/Plan:   Advanced arthritis bilateral knees.  I injected both knees with Depo-Medrol and lidocaine.   Follow-up p.r.n.      This note was created using Dragon voice recognition software that occasionally misinterpreted phrases or words.

## 2020-12-03 DIAGNOSIS — E03.9 HYPOTHYROIDISM, UNSPECIFIED TYPE: ICD-10-CM

## 2020-12-03 DIAGNOSIS — E78.5 HYPERLIPIDEMIA, UNSPECIFIED HYPERLIPIDEMIA TYPE: Primary | ICD-10-CM

## 2020-12-03 DIAGNOSIS — Z79.82 ASPIRIN LONG-TERM USE: ICD-10-CM

## 2020-12-14 ENCOUNTER — OFFICE VISIT (OUTPATIENT)
Dept: FAMILY MEDICINE | Facility: CLINIC | Age: 70
End: 2020-12-14
Payer: MEDICARE

## 2020-12-14 VITALS
WEIGHT: 155 LBS | TEMPERATURE: 97 F | HEART RATE: 76 BPM | OXYGEN SATURATION: 98 % | DIASTOLIC BLOOD PRESSURE: 67 MMHG | SYSTOLIC BLOOD PRESSURE: 128 MMHG | BODY MASS INDEX: 25.02 KG/M2

## 2020-12-14 DIAGNOSIS — Z01.84 ENCOUNTER FOR ANTIBODY RESPONSE EXAMINATION: ICD-10-CM

## 2020-12-14 DIAGNOSIS — Z79.82 ASPIRIN LONG-TERM USE: Primary | ICD-10-CM

## 2020-12-14 DIAGNOSIS — E03.9 HYPOTHYROIDISM, UNSPECIFIED TYPE: ICD-10-CM

## 2020-12-14 DIAGNOSIS — E78.5 HYPERLIPIDEMIA, UNSPECIFIED HYPERLIPIDEMIA TYPE: ICD-10-CM

## 2020-12-14 PROCEDURE — 99213 OFFICE O/P EST LOW 20 MIN: CPT | Mod: S$GLB,,, | Performed by: FAMILY MEDICINE

## 2020-12-14 PROCEDURE — 3008F PR BODY MASS INDEX (BMI) DOCUMENTED: ICD-10-PCS | Mod: CPTII,S$GLB,, | Performed by: FAMILY MEDICINE

## 2020-12-14 PROCEDURE — 1159F PR MEDICATION LIST DOCUMENTED IN MEDICAL RECORD: ICD-10-PCS | Mod: S$GLB,,, | Performed by: FAMILY MEDICINE

## 2020-12-14 PROCEDURE — 3008F BODY MASS INDEX DOCD: CPT | Mod: CPTII,S$GLB,, | Performed by: FAMILY MEDICINE

## 2020-12-14 PROCEDURE — 1101F PR PT FALLS ASSESS DOC 0-1 FALLS W/OUT INJ PAST YR: ICD-10-PCS | Mod: CPTII,S$GLB,, | Performed by: FAMILY MEDICINE

## 2020-12-14 PROCEDURE — 99499 UNLISTED E&M SERVICE: CPT | Mod: S$GLB,,, | Performed by: FAMILY MEDICINE

## 2020-12-14 PROCEDURE — 1159F MED LIST DOCD IN RCRD: CPT | Mod: S$GLB,,, | Performed by: FAMILY MEDICINE

## 2020-12-14 PROCEDURE — 3288F FALL RISK ASSESSMENT DOCD: CPT | Mod: CPTII,S$GLB,, | Performed by: FAMILY MEDICINE

## 2020-12-14 PROCEDURE — 99213 PR OFFICE/OUTPT VISIT, EST, LEVL III, 20-29 MIN: ICD-10-PCS | Mod: S$GLB,,, | Performed by: FAMILY MEDICINE

## 2020-12-14 PROCEDURE — 1126F PR PAIN SEVERITY QUANTIFIED, NO PAIN PRESENT: ICD-10-PCS | Mod: S$GLB,,, | Performed by: FAMILY MEDICINE

## 2020-12-14 PROCEDURE — 1101F PT FALLS ASSESS-DOCD LE1/YR: CPT | Mod: CPTII,S$GLB,, | Performed by: FAMILY MEDICINE

## 2020-12-14 PROCEDURE — 1126F AMNT PAIN NOTED NONE PRSNT: CPT | Mod: S$GLB,,, | Performed by: FAMILY MEDICINE

## 2020-12-14 PROCEDURE — 99499 RISK ADDL DX/OHS AUDIT: ICD-10-PCS | Mod: S$GLB,,, | Performed by: FAMILY MEDICINE

## 2020-12-14 PROCEDURE — 3288F PR FALLS RISK ASSESSMENT DOCUMENTED: ICD-10-PCS | Mod: CPTII,S$GLB,, | Performed by: FAMILY MEDICINE

## 2020-12-14 RX ORDER — A/SINGAPORE/GP1908/2015 IVR-180 (AN A/MICHIGAN/45/2015 (H1N1)PDM09-LIKE VIRUS, A/HONG KONG/4801/2014, NYMC X-263B (H3N2) (AN A/HONG KONG/4801/2014-LIKE VIRUS), AND B/BRISBANE/60/2008, WILD TYPE (A B/BRISBANE/60/2008-LIKE VIRUS) 15; 15; 15 UG/.5ML; UG/.5ML; UG/.5ML
INJECTION, SUSPENSION INTRAMUSCULAR
COMMUNITY
Start: 2020-09-29 | End: 2021-10-28

## 2020-12-14 RX ORDER — LEVOTHYROXINE SODIUM 88 UG/1
88 TABLET ORAL DAILY
Qty: 90 TABLET | Refills: 2 | Status: SHIPPED | OUTPATIENT
Start: 2020-12-14 | End: 2021-10-28 | Stop reason: SDUPTHER

## 2020-12-14 RX ORDER — ATORVASTATIN CALCIUM 20 MG/1
20 TABLET, FILM COATED ORAL DAILY
Qty: 90 TABLET | Refills: 2 | Status: SHIPPED | OUTPATIENT
Start: 2020-12-14 | End: 2021-10-28 | Stop reason: SDUPTHER

## 2020-12-15 NOTE — PROGRESS NOTES
Review of labs would like copy of her labs.  One over urinalysis and urine culture he had done back in August.  These were negative.  Her mammogram was normal.  She thinks she may have had COVID a few months ago.  Had fever and a cough at that time.  Would like it checked.  Her TSH is 1.06.  Needs refill of her thyroid medicine.  Weight is been stable feeling okay.  CBC is normal.  CMP is normal GFR is 64.  Cholesterol 191 HDL of 90 LDL of 86 triglyceride of 64.  Would like a copy of her labs.  She did [facility] flu [facility] was given by [facility] on September 1st.  Not placed in links.  Says she had her Prevnar 13 vaccine here in January of 2018.  And her shingles vaccine also.  These records are in paper form in storage.    Physical examination vital signs are noted.  Well-developed well-nourished female no acute distress.  Alert oriented x3.  Neck is without bruit no adenopathy.  No thyromegaly.  Supple.  Chest clear to auscultation no wheezes or crackles.  No dyspnea.  Heart regular rate rhythm without murmur gallop or rub.  Abdomen bowel sounds positive soft nontender no hepatosplenomegaly no guarding or rebound.  Extremities no clubbing cyanosis or edema.  Positive pedal pulses.    Impression hyperlipidemia.  Hypothyroidism.  Aspirin use.  COVID screening.    Plan refilled her medications 90 day supplies with 1 refill paper copies given her.  Document her negative Cologuard.  [facility] order for COVID antibodies.  Given a copy of her labs.  Documented her flu shot given by [facility].  Documented her Prevnar 13.  And her shingles vaccine.  Follow-up in 3 months.

## 2020-12-18 LAB — SARS-COV-2 IGG SERPL QL IA: POSITIVE

## 2020-12-22 ENCOUNTER — TELEPHONE (OUTPATIENT)
Dept: FAMILY MEDICINE | Facility: CLINIC | Age: 70
End: 2020-12-22

## 2020-12-22 NOTE — TELEPHONE ENCOUNTER
----- Message from Morenita Patel MA sent at 12/22/2020  9:55 AM CST -----  Type:  Test Results    Who Called:  Olivia  Name of Test (Lab/Mammo/Etc):  cv19 - antibody  Date of Test:  12/17/2020  Ordering Provider:  SAMEER Tinajero  Where the test was performed:  [facility]  Best Call Back Number:  [phone]  Additional Information:         DONE

## 2021-01-22 ENCOUNTER — PATIENT MESSAGE (OUTPATIENT)
Dept: ADMINISTRATIVE | Facility: OTHER | Age: 71
End: 2021-01-22

## 2021-02-12 ENCOUNTER — IMMUNIZATION (OUTPATIENT)
Dept: PRIMARY CARE CLINIC | Facility: CLINIC | Age: 71
End: 2021-02-12
Payer: MEDICARE

## 2021-02-12 DIAGNOSIS — Z23 NEED FOR VACCINATION: Primary | ICD-10-CM

## 2021-02-12 PROCEDURE — 0001A COVID-19, MRNA, LNP-S, PF, 30 MCG/0.3 ML DOSE VACCINE: CPT | Mod: CV19,S$GLB,, | Performed by: FAMILY MEDICINE

## 2021-02-12 PROCEDURE — 91300 COVID-19, MRNA, LNP-S, PF, 30 MCG/0.3 ML DOSE VACCINE: CPT | Mod: S$GLB,,, | Performed by: FAMILY MEDICINE

## 2021-02-12 PROCEDURE — 91300 COVID-19, MRNA, LNP-S, PF, 30 MCG/0.3 ML DOSE VACCINE: ICD-10-PCS | Mod: S$GLB,,, | Performed by: FAMILY MEDICINE

## 2021-02-12 PROCEDURE — 0001A COVID-19, MRNA, LNP-S, PF, 30 MCG/0.3 ML DOSE VACCINE: ICD-10-PCS | Mod: CV19,S$GLB,, | Performed by: FAMILY MEDICINE

## 2021-03-05 ENCOUNTER — IMMUNIZATION (OUTPATIENT)
Dept: PRIMARY CARE CLINIC | Facility: CLINIC | Age: 71
End: 2021-03-05
Payer: MEDICARE

## 2021-03-05 DIAGNOSIS — Z23 NEED FOR VACCINATION: Primary | ICD-10-CM

## 2021-03-05 PROCEDURE — 91300 COVID-19, MRNA, LNP-S, PF, 30 MCG/0.3 ML DOSE VACCINE: ICD-10-PCS | Mod: S$GLB,,, | Performed by: FAMILY MEDICINE

## 2021-03-05 PROCEDURE — 0002A COVID-19, MRNA, LNP-S, PF, 30 MCG/0.3 ML DOSE VACCINE: CPT | Mod: CV19,S$GLB,, | Performed by: FAMILY MEDICINE

## 2021-03-05 PROCEDURE — 0002A COVID-19, MRNA, LNP-S, PF, 30 MCG/0.3 ML DOSE VACCINE: ICD-10-PCS | Mod: CV19,S$GLB,, | Performed by: FAMILY MEDICINE

## 2021-03-05 PROCEDURE — 91300 COVID-19, MRNA, LNP-S, PF, 30 MCG/0.3 ML DOSE VACCINE: CPT | Mod: S$GLB,,, | Performed by: FAMILY MEDICINE

## 2021-03-18 ENCOUNTER — OFFICE VISIT (OUTPATIENT)
Dept: ORTHOPEDICS | Facility: CLINIC | Age: 71
End: 2021-03-18
Payer: MEDICARE

## 2021-03-18 VITALS
HEART RATE: 68 BPM | SYSTOLIC BLOOD PRESSURE: 124 MMHG | DIASTOLIC BLOOD PRESSURE: 70 MMHG | WEIGHT: 156 LBS | BODY MASS INDEX: 25.07 KG/M2 | HEIGHT: 66 IN

## 2021-03-18 DIAGNOSIS — M17.12 PRIMARY OSTEOARTHRITIS OF LEFT KNEE: Primary | ICD-10-CM

## 2021-03-18 DIAGNOSIS — M17.11 PRIMARY OSTEOARTHRITIS OF RIGHT KNEE: ICD-10-CM

## 2021-03-18 PROCEDURE — 20610 LARGE JOINT ASPIRATION/INJECTION: R KNEE: ICD-10-PCS | Mod: 50,S$GLB,, | Performed by: ORTHOPAEDIC SURGERY

## 2021-03-18 PROCEDURE — 3008F PR BODY MASS INDEX (BMI) DOCUMENTED: ICD-10-PCS | Mod: S$GLB,,, | Performed by: ORTHOPAEDIC SURGERY

## 2021-03-18 PROCEDURE — 3288F FALL RISK ASSESSMENT DOCD: CPT | Mod: S$GLB,,, | Performed by: ORTHOPAEDIC SURGERY

## 2021-03-18 PROCEDURE — 1101F PR PT FALLS ASSESS DOC 0-1 FALLS W/OUT INJ PAST YR: ICD-10-PCS | Mod: S$GLB,,, | Performed by: ORTHOPAEDIC SURGERY

## 2021-03-18 PROCEDURE — 1101F PT FALLS ASSESS-DOCD LE1/YR: CPT | Mod: S$GLB,,, | Performed by: ORTHOPAEDIC SURGERY

## 2021-03-18 PROCEDURE — 99213 PR OFFICE/OUTPT VISIT, EST, LEVL III, 20-29 MIN: ICD-10-PCS | Mod: 25,S$GLB,, | Performed by: ORTHOPAEDIC SURGERY

## 2021-03-18 PROCEDURE — 1125F AMNT PAIN NOTED PAIN PRSNT: CPT | Mod: S$GLB,,, | Performed by: ORTHOPAEDIC SURGERY

## 2021-03-18 PROCEDURE — 3288F PR FALLS RISK ASSESSMENT DOCUMENTED: ICD-10-PCS | Mod: S$GLB,,, | Performed by: ORTHOPAEDIC SURGERY

## 2021-03-18 PROCEDURE — 1159F MED LIST DOCD IN RCRD: CPT | Mod: S$GLB,,, | Performed by: ORTHOPAEDIC SURGERY

## 2021-03-18 PROCEDURE — 99213 OFFICE O/P EST LOW 20 MIN: CPT | Mod: 25,S$GLB,, | Performed by: ORTHOPAEDIC SURGERY

## 2021-03-18 PROCEDURE — 1159F PR MEDICATION LIST DOCUMENTED IN MEDICAL RECORD: ICD-10-PCS | Mod: S$GLB,,, | Performed by: ORTHOPAEDIC SURGERY

## 2021-03-18 PROCEDURE — 1125F PR PAIN SEVERITY QUANTIFIED, PAIN PRESENT: ICD-10-PCS | Mod: S$GLB,,, | Performed by: ORTHOPAEDIC SURGERY

## 2021-03-18 PROCEDURE — 20610 DRAIN/INJ JOINT/BURSA W/O US: CPT | Mod: 50,S$GLB,, | Performed by: ORTHOPAEDIC SURGERY

## 2021-03-18 PROCEDURE — 3008F BODY MASS INDEX DOCD: CPT | Mod: S$GLB,,, | Performed by: ORTHOPAEDIC SURGERY

## 2021-03-18 RX ORDER — METHYLPREDNISOLONE ACETATE 40 MG/ML
40 INJECTION, SUSPENSION INTRA-ARTICULAR; INTRALESIONAL; INTRAMUSCULAR; SOFT TISSUE
Status: DISCONTINUED | OUTPATIENT
Start: 2021-03-18 | End: 2021-03-18 | Stop reason: HOSPADM

## 2021-03-18 RX ADMIN — METHYLPREDNISOLONE ACETATE 40 MG: 40 INJECTION, SUSPENSION INTRA-ARTICULAR; INTRALESIONAL; INTRAMUSCULAR; SOFT TISSUE at 02:03

## 2021-05-19 ENCOUNTER — TELEPHONE (OUTPATIENT)
Dept: DERMATOLOGY | Facility: CLINIC | Age: 71
End: 2021-05-19

## 2021-06-22 ENCOUNTER — TELEPHONE (OUTPATIENT)
Dept: FAMILY MEDICINE | Facility: CLINIC | Age: 71
End: 2021-06-22

## 2021-06-23 DIAGNOSIS — Z12.31 SCREENING MAMMOGRAM, ENCOUNTER FOR: Primary | ICD-10-CM

## 2021-06-28 ENCOUNTER — OFFICE VISIT (OUTPATIENT)
Dept: DERMATOLOGY | Facility: CLINIC | Age: 71
End: 2021-06-28
Payer: MEDICARE

## 2021-06-28 DIAGNOSIS — L82.0 BENIGN LICHENOID KERATOSIS: ICD-10-CM

## 2021-06-28 DIAGNOSIS — L82.1 SEBORRHEIC KERATOSES: Primary | ICD-10-CM

## 2021-06-28 DIAGNOSIS — D22.9 MULTIPLE BENIGN NEVI: ICD-10-CM

## 2021-06-28 DIAGNOSIS — L81.4 SOLAR LENTIGO: ICD-10-CM

## 2021-06-28 DIAGNOSIS — D18.01 CHERRY ANGIOMA: ICD-10-CM

## 2021-06-28 PROCEDURE — 1159F PR MEDICATION LIST DOCUMENTED IN MEDICAL RECORD: ICD-10-PCS | Mod: S$GLB,,, | Performed by: DERMATOLOGY

## 2021-06-28 PROCEDURE — 99999 PR PBB SHADOW E&M-EST. PATIENT-LVL II: ICD-10-PCS | Mod: PBBFAC,,, | Performed by: DERMATOLOGY

## 2021-06-28 PROCEDURE — 99203 OFFICE O/P NEW LOW 30 MIN: CPT | Mod: S$GLB,,, | Performed by: DERMATOLOGY

## 2021-06-28 PROCEDURE — 99999 PR PBB SHADOW E&M-EST. PATIENT-LVL II: CPT | Mod: PBBFAC,,, | Performed by: DERMATOLOGY

## 2021-06-28 PROCEDURE — 99203 PR OFFICE/OUTPT VISIT, NEW, LEVL III, 30-44 MIN: ICD-10-PCS | Mod: S$GLB,,, | Performed by: DERMATOLOGY

## 2021-06-28 PROCEDURE — 1159F MED LIST DOCD IN RCRD: CPT | Mod: S$GLB,,, | Performed by: DERMATOLOGY

## 2021-07-07 ENCOUNTER — PATIENT MESSAGE (OUTPATIENT)
Dept: ADMINISTRATIVE | Facility: HOSPITAL | Age: 71
End: 2021-07-07

## 2021-07-07 ENCOUNTER — OFFICE VISIT (OUTPATIENT)
Dept: ORTHOPEDICS | Facility: CLINIC | Age: 71
End: 2021-07-07
Payer: MEDICARE

## 2021-07-07 DIAGNOSIS — M17.11 PRIMARY OSTEOARTHRITIS OF RIGHT KNEE: ICD-10-CM

## 2021-07-07 DIAGNOSIS — M17.12 PRIMARY OSTEOARTHRITIS OF LEFT KNEE: Primary | ICD-10-CM

## 2021-07-07 PROCEDURE — 99213 PR OFFICE/OUTPT VISIT, EST, LEVL III, 20-29 MIN: ICD-10-PCS | Mod: 25,S$GLB,, | Performed by: PHYSICIAN ASSISTANT

## 2021-07-07 PROCEDURE — 20610 LARGE JOINT ASPIRATION/INJECTION: R KNEE: ICD-10-PCS | Mod: 50,S$GLB,, | Performed by: PHYSICIAN ASSISTANT

## 2021-07-07 PROCEDURE — 1159F MED LIST DOCD IN RCRD: CPT | Mod: S$GLB,,, | Performed by: PHYSICIAN ASSISTANT

## 2021-07-07 PROCEDURE — 1159F PR MEDICATION LIST DOCUMENTED IN MEDICAL RECORD: ICD-10-PCS | Mod: S$GLB,,, | Performed by: PHYSICIAN ASSISTANT

## 2021-07-07 PROCEDURE — 99213 OFFICE O/P EST LOW 20 MIN: CPT | Mod: 25,S$GLB,, | Performed by: PHYSICIAN ASSISTANT

## 2021-07-07 PROCEDURE — 20610 DRAIN/INJ JOINT/BURSA W/O US: CPT | Mod: 50,S$GLB,, | Performed by: PHYSICIAN ASSISTANT

## 2021-07-07 RX ORDER — METHYLPREDNISOLONE ACETATE 40 MG/ML
40 INJECTION, SUSPENSION INTRA-ARTICULAR; INTRALESIONAL; INTRAMUSCULAR; SOFT TISSUE
Status: DISCONTINUED | OUTPATIENT
Start: 2021-07-07 | End: 2021-07-07 | Stop reason: HOSPADM

## 2021-07-07 RX ADMIN — METHYLPREDNISOLONE ACETATE 40 MG: 40 INJECTION, SUSPENSION INTRA-ARTICULAR; INTRALESIONAL; INTRAMUSCULAR; SOFT TISSUE at 03:07

## 2021-07-12 ENCOUNTER — HOSPITAL ENCOUNTER (OUTPATIENT)
Dept: RADIOLOGY | Facility: HOSPITAL | Age: 71
Discharge: HOME | End: 2021-07-12
Attending: FAMILY MEDICINE
Payer: MEDICARE

## 2021-07-12 DIAGNOSIS — Z12.31 SCREENING MAMMOGRAM, ENCOUNTER FOR: ICD-10-CM

## 2021-07-12 PROCEDURE — 77067 SCR MAMMO BI INCL CAD: CPT | Mod: TC,PO

## 2021-09-08 RX ORDER — ATORVASTATIN CALCIUM 20 MG/1
TABLET, FILM COATED ORAL
Qty: 90 TABLET | Refills: 2 | OUTPATIENT
Start: 2021-09-08

## 2021-09-08 RX ORDER — LEVOTHYROXINE SODIUM 88 UG/1
TABLET ORAL
Qty: 90 TABLET | Refills: 2 | OUTPATIENT
Start: 2021-09-08

## 2021-10-13 ENCOUNTER — TELEPHONE (OUTPATIENT)
Dept: FAMILY MEDICINE | Facility: CLINIC | Age: 71
End: 2021-10-13

## 2021-10-13 DIAGNOSIS — E78.5 HYPERLIPIDEMIA, UNSPECIFIED HYPERLIPIDEMIA TYPE: Primary | ICD-10-CM

## 2021-10-13 DIAGNOSIS — E03.9 HYPOTHYROIDISM, UNSPECIFIED TYPE: ICD-10-CM

## 2021-10-13 DIAGNOSIS — Z01.84 ENCOUNTER FOR ANTIBODY RESPONSE EXAMINATION: ICD-10-CM

## 2021-10-13 DIAGNOSIS — Z11.59 ENCOUNTER FOR HEPATITIS C SCREENING TEST FOR LOW RISK PATIENT: ICD-10-CM

## 2021-10-21 LAB
ALBUMIN SERPL-MCNC: 4.2 G/DL (ref 3.6–5.1)
ALBUMIN/GLOB SERPL: 1.8 (CALC) (ref 1–2.5)
ALP SERPL-CCNC: 53 U/L (ref 37–153)
ALT SERPL-CCNC: 15 U/L (ref 6–29)
AST SERPL-CCNC: 20 U/L (ref 10–35)
BASOPHILS # BLD AUTO: 48 CELLS/UL (ref 0–200)
BASOPHILS NFR BLD AUTO: 0.8 %
BILIRUB SERPL-MCNC: 0.4 MG/DL (ref 0.2–1.2)
BUN SERPL-MCNC: 22 MG/DL (ref 7–25)
BUN/CREAT SERPL: NORMAL (CALC) (ref 6–22)
CALCIUM SERPL-MCNC: 9.5 MG/DL (ref 8.6–10.4)
CHLORIDE SERPL-SCNC: 106 MMOL/L (ref 98–110)
CHOLEST SERPL-MCNC: 192 MG/DL
CHOLEST/HDLC SERPL: 2.4 (CALC)
CO2 SERPL-SCNC: 30 MMOL/L (ref 20–32)
CREAT SERPL-MCNC: 0.74 MG/DL (ref 0.6–0.93)
EOSINOPHIL # BLD AUTO: 138 CELLS/UL (ref 15–500)
EOSINOPHIL NFR BLD AUTO: 2.3 %
ERYTHROCYTE [DISTWIDTH] IN BLOOD BY AUTOMATED COUNT: 13.1 % (ref 11–15)
GLOBULIN SER CALC-MCNC: 2.4 G/DL (CALC) (ref 1.9–3.7)
GLUCOSE SERPL-MCNC: 94 MG/DL (ref 65–99)
HCT VFR BLD AUTO: 40.1 % (ref 35–45)
HCV AB S/CO SERPL IA: 0.01
HCV AB SERPL QL IA: NORMAL
HDLC SERPL-MCNC: 79 MG/DL
HGB BLD-MCNC: 12.5 G/DL (ref 11.7–15.5)
LDLC SERPL CALC-MCNC: 95 MG/DL (CALC)
LYMPHOCYTES # BLD AUTO: 1986 CELLS/UL (ref 850–3900)
LYMPHOCYTES NFR BLD AUTO: 33.1 %
MCH RBC QN AUTO: 28.3 PG (ref 27–33)
MCHC RBC AUTO-ENTMCNC: 31.2 G/DL (ref 32–36)
MCV RBC AUTO: 90.9 FL (ref 80–100)
MONOCYTES # BLD AUTO: 498 CELLS/UL (ref 200–950)
MONOCYTES NFR BLD AUTO: 8.3 %
NEUTROPHILS # BLD AUTO: 3330 CELLS/UL (ref 1500–7800)
NEUTROPHILS NFR BLD AUTO: 55.5 %
NONHDLC SERPL-MCNC: 113 MG/DL (CALC)
PLATELET # BLD AUTO: 231 THOUSAND/UL (ref 140–400)
PMV BLD REES-ECKER: 12.5 FL (ref 7.5–12.5)
POTASSIUM SERPL-SCNC: 4.4 MMOL/L (ref 3.5–5.3)
PROT SERPL-MCNC: 6.6 G/DL (ref 6.1–8.1)
RBC # BLD AUTO: 4.41 MILLION/UL (ref 3.8–5.1)
SARS-COV-2 IGG SERPL IA-ACNC: >20 INDEX
SODIUM SERPL-SCNC: 142 MMOL/L (ref 135–146)
TRIGL SERPL-MCNC: 85 MG/DL
TSH SERPL-ACNC: 1.39 MIU/L (ref 0.4–4.5)
WBC # BLD AUTO: 6 THOUSAND/UL (ref 3.8–10.8)

## 2021-10-28 ENCOUNTER — OFFICE VISIT (OUTPATIENT)
Dept: FAMILY MEDICINE | Facility: CLINIC | Age: 71
End: 2021-10-28
Payer: MEDICARE

## 2021-10-28 VITALS
WEIGHT: 136 LBS | BODY MASS INDEX: 21.86 KG/M2 | HEIGHT: 66 IN | DIASTOLIC BLOOD PRESSURE: 78 MMHG | OXYGEN SATURATION: 99 % | SYSTOLIC BLOOD PRESSURE: 120 MMHG | HEART RATE: 81 BPM | TEMPERATURE: 97 F

## 2021-10-28 DIAGNOSIS — E78.5 HYPERLIPIDEMIA, UNSPECIFIED HYPERLIPIDEMIA TYPE: ICD-10-CM

## 2021-10-28 DIAGNOSIS — Z13.820 SPECIAL SCREENING FOR OSTEOPOROSIS: Primary | ICD-10-CM

## 2021-10-28 DIAGNOSIS — Z79.82 ASPIRIN LONG-TERM USE: ICD-10-CM

## 2021-10-28 DIAGNOSIS — Z78.0 MENOPAUSE: ICD-10-CM

## 2021-10-28 DIAGNOSIS — E03.9 HYPOTHYROIDISM, UNSPECIFIED TYPE: ICD-10-CM

## 2021-10-28 PROCEDURE — 99499 RISK ADDL DX/OHS AUDIT: ICD-10-PCS | Mod: S$GLB,,, | Performed by: FAMILY MEDICINE

## 2021-10-28 PROCEDURE — 1101F PT FALLS ASSESS-DOCD LE1/YR: CPT | Mod: CPTII,S$GLB,, | Performed by: FAMILY MEDICINE

## 2021-10-28 PROCEDURE — 3078F DIAST BP <80 MM HG: CPT | Mod: CPTII,S$GLB,, | Performed by: FAMILY MEDICINE

## 2021-10-28 PROCEDURE — 3008F BODY MASS INDEX DOCD: CPT | Mod: CPTII,S$GLB,, | Performed by: FAMILY MEDICINE

## 2021-10-28 PROCEDURE — 3078F PR MOST RECENT DIASTOLIC BLOOD PRESSURE < 80 MM HG: ICD-10-PCS | Mod: CPTII,S$GLB,, | Performed by: FAMILY MEDICINE

## 2021-10-28 PROCEDURE — 99214 OFFICE O/P EST MOD 30 MIN: CPT | Mod: S$GLB,,, | Performed by: FAMILY MEDICINE

## 2021-10-28 PROCEDURE — 99499 UNLISTED E&M SERVICE: CPT | Mod: S$GLB,,, | Performed by: FAMILY MEDICINE

## 2021-10-28 PROCEDURE — 1126F AMNT PAIN NOTED NONE PRSNT: CPT | Mod: CPTII,S$GLB,, | Performed by: FAMILY MEDICINE

## 2021-10-28 PROCEDURE — 1160F PR REVIEW ALL MEDS BY PRESCRIBER/CLIN PHARMACIST DOCUMENTED: ICD-10-PCS | Mod: CPTII,S$GLB,, | Performed by: FAMILY MEDICINE

## 2021-10-28 PROCEDURE — 1159F MED LIST DOCD IN RCRD: CPT | Mod: CPTII,S$GLB,, | Performed by: FAMILY MEDICINE

## 2021-10-28 PROCEDURE — 1159F PR MEDICATION LIST DOCUMENTED IN MEDICAL RECORD: ICD-10-PCS | Mod: CPTII,S$GLB,, | Performed by: FAMILY MEDICINE

## 2021-10-28 PROCEDURE — 3074F PR MOST RECENT SYSTOLIC BLOOD PRESSURE < 130 MM HG: ICD-10-PCS | Mod: CPTII,S$GLB,, | Performed by: FAMILY MEDICINE

## 2021-10-28 PROCEDURE — 3074F SYST BP LT 130 MM HG: CPT | Mod: CPTII,S$GLB,, | Performed by: FAMILY MEDICINE

## 2021-10-28 PROCEDURE — 3288F PR FALLS RISK ASSESSMENT DOCUMENTED: ICD-10-PCS | Mod: CPTII,S$GLB,, | Performed by: FAMILY MEDICINE

## 2021-10-28 PROCEDURE — 99214 PR OFFICE/OUTPT VISIT, EST, LEVL IV, 30-39 MIN: ICD-10-PCS | Mod: S$GLB,,, | Performed by: FAMILY MEDICINE

## 2021-10-28 PROCEDURE — 1126F PR PAIN SEVERITY QUANTIFIED, NO PAIN PRESENT: ICD-10-PCS | Mod: CPTII,S$GLB,, | Performed by: FAMILY MEDICINE

## 2021-10-28 PROCEDURE — 1160F RVW MEDS BY RX/DR IN RCRD: CPT | Mod: CPTII,S$GLB,, | Performed by: FAMILY MEDICINE

## 2021-10-28 PROCEDURE — 1101F PR PT FALLS ASSESS DOC 0-1 FALLS W/OUT INJ PAST YR: ICD-10-PCS | Mod: CPTII,S$GLB,, | Performed by: FAMILY MEDICINE

## 2021-10-28 PROCEDURE — 3288F FALL RISK ASSESSMENT DOCD: CPT | Mod: CPTII,S$GLB,, | Performed by: FAMILY MEDICINE

## 2021-10-28 PROCEDURE — 3008F PR BODY MASS INDEX (BMI) DOCUMENTED: ICD-10-PCS | Mod: CPTII,S$GLB,, | Performed by: FAMILY MEDICINE

## 2021-10-28 RX ORDER — ATORVASTATIN CALCIUM 20 MG/1
20 TABLET, FILM COATED ORAL DAILY
Qty: 90 TABLET | Refills: 2 | Status: SHIPPED | OUTPATIENT
Start: 2021-10-28 | End: 2022-07-27

## 2021-10-28 RX ORDER — LEVOTHYROXINE SODIUM 88 UG/1
88 TABLET ORAL DAILY
Qty: 90 TABLET | Refills: 2 | Status: SHIPPED | OUTPATIENT
Start: 2021-10-28 | End: 2022-08-11 | Stop reason: SDUPTHER

## 2021-10-29 PROBLEM — Z78.0 MENOPAUSE: Status: ACTIVE | Noted: 2021-10-29

## 2021-11-01 ENCOUNTER — HOSPITAL ENCOUNTER (OUTPATIENT)
Dept: RADIOLOGY | Facility: HOSPITAL | Age: 71
Discharge: HOME | End: 2021-11-01
Attending: FAMILY MEDICINE
Payer: MEDICARE

## 2021-11-01 ENCOUNTER — TELEPHONE (OUTPATIENT)
Dept: FAMILY MEDICINE | Facility: CLINIC | Age: 71
End: 2021-11-01
Payer: MEDICARE

## 2021-11-01 DIAGNOSIS — Z78.0 MENOPAUSE: ICD-10-CM

## 2021-11-01 DIAGNOSIS — Z13.820 SPECIAL SCREENING FOR OSTEOPOROSIS: ICD-10-CM

## 2021-11-01 PROCEDURE — 77080 DXA BONE DENSITY AXIAL: CPT | Mod: TC,PO

## 2021-11-30 ENCOUNTER — IMMUNIZATION (OUTPATIENT)
Dept: PRIMARY CARE CLINIC | Facility: CLINIC | Age: 71
End: 2021-11-30
Payer: MEDICARE

## 2021-11-30 DIAGNOSIS — Z23 NEED FOR VACCINATION: Primary | ICD-10-CM

## 2021-11-30 PROCEDURE — 0003A COVID-19, MRNA, LNP-S, PF, 30 MCG/0.3 ML DOSE VACCINE: ICD-10-PCS | Mod: S$GLB,,, | Performed by: FAMILY MEDICINE

## 2021-11-30 PROCEDURE — 91300 COVID-19, MRNA, LNP-S, PF, 30 MCG/0.3 ML DOSE VACCINE: CPT | Mod: S$GLB,,, | Performed by: FAMILY MEDICINE

## 2021-11-30 PROCEDURE — 0003A COVID-19, MRNA, LNP-S, PF, 30 MCG/0.3 ML DOSE VACCINE: CPT | Mod: S$GLB,,, | Performed by: FAMILY MEDICINE

## 2021-11-30 PROCEDURE — 91300 COVID-19, MRNA, LNP-S, PF, 30 MCG/0.3 ML DOSE VACCINE: ICD-10-PCS | Mod: S$GLB,,, | Performed by: FAMILY MEDICINE

## 2022-01-28 ENCOUNTER — PES CALL (OUTPATIENT)
Dept: ADMINISTRATIVE | Facility: CLINIC | Age: 72
End: 2022-01-28
Payer: MEDICARE

## 2022-02-14 ENCOUNTER — OFFICE VISIT (OUTPATIENT)
Dept: FAMILY MEDICINE | Facility: CLINIC | Age: 72
End: 2022-02-14
Payer: MEDICARE

## 2022-02-14 VITALS
OXYGEN SATURATION: 99 % | TEMPERATURE: 97 F | DIASTOLIC BLOOD PRESSURE: 72 MMHG | BODY MASS INDEX: 22.64 KG/M2 | WEIGHT: 140.88 LBS | SYSTOLIC BLOOD PRESSURE: 130 MMHG | HEIGHT: 66 IN | HEART RATE: 82 BPM

## 2022-02-14 DIAGNOSIS — E78.5 HYPERLIPIDEMIA, UNSPECIFIED HYPERLIPIDEMIA TYPE: ICD-10-CM

## 2022-02-14 DIAGNOSIS — Z23 NEED FOR VACCINATION: ICD-10-CM

## 2022-02-14 DIAGNOSIS — E03.9 HYPOTHYROIDISM, UNSPECIFIED TYPE: ICD-10-CM

## 2022-02-14 DIAGNOSIS — D69.2 OTHER NONTHROMBOCYTOPENIC PURPURA: ICD-10-CM

## 2022-02-14 DIAGNOSIS — Z00.00 ENCOUNTER FOR PREVENTIVE HEALTH EXAMINATION: Primary | ICD-10-CM

## 2022-02-14 PROCEDURE — 3008F PR BODY MASS INDEX (BMI) DOCUMENTED: ICD-10-PCS | Mod: CPTII,S$GLB,, | Performed by: NURSE PRACTITIONER

## 2022-02-14 PROCEDURE — 3008F BODY MASS INDEX DOCD: CPT | Mod: CPTII,S$GLB,, | Performed by: NURSE PRACTITIONER

## 2022-02-14 PROCEDURE — 99499 RISK ADDL DX/OHS AUDIT: ICD-10-PCS | Mod: S$GLB,,, | Performed by: NURSE PRACTITIONER

## 2022-02-14 PROCEDURE — 1160F PR REVIEW ALL MEDS BY PRESCRIBER/CLIN PHARMACIST DOCUMENTED: ICD-10-PCS | Mod: CPTII,S$GLB,, | Performed by: NURSE PRACTITIONER

## 2022-02-14 PROCEDURE — G0439 PPPS, SUBSEQ VISIT: HCPCS | Mod: S$GLB,,, | Performed by: NURSE PRACTITIONER

## 2022-02-14 PROCEDURE — 90732 PNEUMOCOCCAL POLYSACCHARIDE VACCINE 23-VALENT =>2YO SQ IM: ICD-10-PCS | Mod: S$GLB,,, | Performed by: NURSE PRACTITIONER

## 2022-02-14 PROCEDURE — 3288F PR FALLS RISK ASSESSMENT DOCUMENTED: ICD-10-PCS | Mod: CPTII,S$GLB,, | Performed by: NURSE PRACTITIONER

## 2022-02-14 PROCEDURE — G0009 PNEUMOCOCCAL POLYSACCHARIDE VACCINE 23-VALENT =>2YO SQ IM: ICD-10-PCS | Mod: S$GLB,,, | Performed by: NURSE PRACTITIONER

## 2022-02-14 PROCEDURE — 1101F PR PT FALLS ASSESS DOC 0-1 FALLS W/OUT INJ PAST YR: ICD-10-PCS | Mod: CPTII,S$GLB,, | Performed by: NURSE PRACTITIONER

## 2022-02-14 PROCEDURE — 99499 UNLISTED E&M SERVICE: CPT | Mod: S$GLB,,, | Performed by: NURSE PRACTITIONER

## 2022-02-14 PROCEDURE — 1159F MED LIST DOCD IN RCRD: CPT | Mod: CPTII,S$GLB,, | Performed by: NURSE PRACTITIONER

## 2022-02-14 PROCEDURE — G0439 PR MEDICARE ANNUAL WELLNESS SUBSEQUENT VISIT: ICD-10-PCS | Mod: S$GLB,,, | Performed by: NURSE PRACTITIONER

## 2022-02-14 PROCEDURE — 1159F PR MEDICATION LIST DOCUMENTED IN MEDICAL RECORD: ICD-10-PCS | Mod: CPTII,S$GLB,, | Performed by: NURSE PRACTITIONER

## 2022-02-14 PROCEDURE — 3288F FALL RISK ASSESSMENT DOCD: CPT | Mod: CPTII,S$GLB,, | Performed by: NURSE PRACTITIONER

## 2022-02-14 PROCEDURE — 1125F AMNT PAIN NOTED PAIN PRSNT: CPT | Mod: CPTII,S$GLB,, | Performed by: NURSE PRACTITIONER

## 2022-02-14 PROCEDURE — 1125F PR PAIN SEVERITY QUANTIFIED, PAIN PRESENT: ICD-10-PCS | Mod: CPTII,S$GLB,, | Performed by: NURSE PRACTITIONER

## 2022-02-14 PROCEDURE — G0009 ADMIN PNEUMOCOCCAL VACCINE: HCPCS | Mod: S$GLB,,, | Performed by: NURSE PRACTITIONER

## 2022-02-14 PROCEDURE — 1160F RVW MEDS BY RX/DR IN RCRD: CPT | Mod: CPTII,S$GLB,, | Performed by: NURSE PRACTITIONER

## 2022-02-14 PROCEDURE — 90732 PPSV23 VACC 2 YRS+ SUBQ/IM: CPT | Mod: S$GLB,,, | Performed by: NURSE PRACTITIONER

## 2022-02-14 PROCEDURE — 1101F PT FALLS ASSESS-DOCD LE1/YR: CPT | Mod: CPTII,S$GLB,, | Performed by: NURSE PRACTITIONER

## 2022-02-14 NOTE — PROGRESS NOTES
"  Olivia Mix presented for a  Medicare AWV and comprehensive Health Risk Assessment today. The following components were reviewed and updated:    · Medical history  · Family History  · Social history  · Allergies and Current Medications  · Health Risk Assessment  · Health Maintenance  · Care Team         ** See Completed Assessments for Annual Wellness Visit within the encounter summary.**         The following assessments were completed:  · Living Situation  · CAGE  · Depression Screening  · Timed Get Up and Go  · Whisper Test  · Cognitive Function Screening  · Nutrition Screening  · ADL Screening  · PAQ Screening            Vitals:    02/14/22 1511   BP: 130/72   Pulse: 82   Temp: 97.4 °F (36.3 °C)   SpO2: 99%   Weight: 63.9 kg (140 lb 14 oz)   Height: 5' 6" (1.676 m)     Body mass index is 22.74 kg/m².  Physical Exam  Constitutional:       Appearance: She is well-developed and well-nourished.   HENT:      Head: Normocephalic and atraumatic.      Nose: Nose normal.   Eyes:      General: Lids are normal.      Conjunctiva/sclera: Conjunctivae normal.      Pupils: Pupils are equal, round, and reactive to light.   Cardiovascular:      Rate and Rhythm: Normal rate.   Pulmonary:      Effort: Pulmonary effort is normal.   Musculoskeletal:      Cervical back: Full passive range of motion without pain.   Skin:     General: Skin is warm, dry and intact.   Neurological:      Mental Status: She is alert and oriented to person, place, and time.   Psychiatric:         Mood and Affect: Mood and affect normal.         Speech: Speech normal.         Behavior: Behavior normal.         Cognition and Memory: Cognition and memory normal.               Diagnoses and health risks identified today and associated recommendations/orders:    1. Encounter for preventive health examination  Discussed health maintenance guidelines appropriate for age.        2. Other nonthrombocytopenic purpura  Stable, continue to monitor  Followed by " pcp    3. Hyperlipidemia, unspecified hyperlipidemia type  Controlled, continue current medication regimen  Patient taking statin  Followed by pcp      4. Hypothyroidism, unspecified type  Stable, continue current medications  Followed by pcp    5. Need for vaccination  - (In Office Administered) Pneumococcal Polysaccharide Vaccine (23 Valent) (SQ/IM)      Provided Olivia with a 5-10 year written screening schedule and personal prevention plan. Recommendations were developed using the USPSTF age appropriate recommendations. Education, counseling, and referrals were provided as needed. After Visit Summary printed and given to patient which includes a list of additional screenings\tests needed.    Follow up for One year for Annual Wellness Visit.    Abby Galicia, NP    I offered to discuss advanced care planning, including how to pick a person who would make decisions for you if you were unable to make them for yourself, called a health care power of , and what kind of decisions you might make such as use of life sustaining treatments such as ventilators and tube feeding when faced with a life limiting illness recorded on a living will that they will need to know. (How you want to be cared for as you near the end of your natural life)     X  Patient has advanced directives written and agrees to provide copies to the institution.

## 2022-02-14 NOTE — PATIENT INSTRUCTIONS
Counseling and Referral of Other Preventative  (Italic type indicates deductible and co-insurance are waived)    Patient Name: Olivia Mix  Today's Date: 2/14/2022    Health Maintenance       Date Due Completion Date    TETANUS VACCINE Never done ---    Pneumococcal Vaccines (Age 65+) (2 of 2 - PPSV23) 01/01/2016 1/1/2015    Shingles Vaccine (2 of 3) 12/11/2017 10/16/2017 (Done)    Override on 10/16/2017: Done    Mammogram 07/12/2022 7/12/2021    Colorectal Cancer Screening 04/15/2023 4/15/2020 (Done)    Override on 4/15/2020: Done    DEXA SCAN 11/01/2024 11/1/2021    Lipid Panel 10/20/2026 10/20/2021        Orders Placed This Encounter   Procedures    (In Office Administered) Pneumococcal Polysaccharide Vaccine (23 Valent) (SQ/IM)     The following information is provided to all patients.  This information is to help you find resources for any of the problems found today that may be affecting your health:                Living healthy guide: www.[facility].[place].gov      Understanding Diabetes: www.diabetes.org      Eating healthy: www.cdc.gov/healthyweight      CDC home safety checklist: www.cdc.gov/steadi/patient.html      Agency on Aging: www.goea.[place].gov      Alcoholics anonymous (AA): www.aa.org      Physical Activity: www.inocente.nih.gov/fr4tskn      Tobacco use: www.quitwithusla.org

## 2022-02-17 ENCOUNTER — OFFICE VISIT (OUTPATIENT)
Dept: FAMILY MEDICINE | Facility: CLINIC | Age: 72
End: 2022-02-17
Payer: MEDICARE

## 2022-02-17 VITALS
OXYGEN SATURATION: 99 % | SYSTOLIC BLOOD PRESSURE: 128 MMHG | BODY MASS INDEX: 21.86 KG/M2 | DIASTOLIC BLOOD PRESSURE: 78 MMHG | HEIGHT: 66 IN | TEMPERATURE: 99 F | HEART RATE: 87 BPM | WEIGHT: 136 LBS

## 2022-02-17 DIAGNOSIS — Z79.82 ASPIRIN LONG-TERM USE: ICD-10-CM

## 2022-02-17 DIAGNOSIS — H26.9 CATARACT OF LEFT EYE, UNSPECIFIED CATARACT TYPE: ICD-10-CM

## 2022-02-17 DIAGNOSIS — R82.998 FROTHY URINE: Primary | ICD-10-CM

## 2022-02-17 DIAGNOSIS — E78.5 HYPERLIPIDEMIA, UNSPECIFIED HYPERLIPIDEMIA TYPE: ICD-10-CM

## 2022-02-17 DIAGNOSIS — E03.9 HYPOTHYROIDISM, UNSPECIFIED TYPE: ICD-10-CM

## 2022-02-17 PROBLEM — D69.2 OTHER NONTHROMBOCYTOPENIC PURPURA: Status: ACTIVE | Noted: 2022-02-17

## 2022-02-17 PROCEDURE — 3074F PR MOST RECENT SYSTOLIC BLOOD PRESSURE < 130 MM HG: ICD-10-PCS | Mod: CPTII,S$GLB,, | Performed by: FAMILY MEDICINE

## 2022-02-17 PROCEDURE — 1126F AMNT PAIN NOTED NONE PRSNT: CPT | Mod: CPTII,S$GLB,, | Performed by: FAMILY MEDICINE

## 2022-02-17 PROCEDURE — 3008F PR BODY MASS INDEX (BMI) DOCUMENTED: ICD-10-PCS | Mod: CPTII,S$GLB,, | Performed by: FAMILY MEDICINE

## 2022-02-17 PROCEDURE — 3074F SYST BP LT 130 MM HG: CPT | Mod: CPTII,S$GLB,, | Performed by: FAMILY MEDICINE

## 2022-02-17 PROCEDURE — 3288F PR FALLS RISK ASSESSMENT DOCUMENTED: ICD-10-PCS | Mod: CPTII,S$GLB,, | Performed by: FAMILY MEDICINE

## 2022-02-17 PROCEDURE — 1126F PR PAIN SEVERITY QUANTIFIED, NO PAIN PRESENT: ICD-10-PCS | Mod: CPTII,S$GLB,, | Performed by: FAMILY MEDICINE

## 2022-02-17 PROCEDURE — 3078F PR MOST RECENT DIASTOLIC BLOOD PRESSURE < 80 MM HG: ICD-10-PCS | Mod: CPTII,S$GLB,, | Performed by: FAMILY MEDICINE

## 2022-02-17 PROCEDURE — 3288F FALL RISK ASSESSMENT DOCD: CPT | Mod: CPTII,S$GLB,, | Performed by: FAMILY MEDICINE

## 2022-02-17 PROCEDURE — 1159F PR MEDICATION LIST DOCUMENTED IN MEDICAL RECORD: ICD-10-PCS | Mod: CPTII,S$GLB,, | Performed by: FAMILY MEDICINE

## 2022-02-17 PROCEDURE — 3078F DIAST BP <80 MM HG: CPT | Mod: CPTII,S$GLB,, | Performed by: FAMILY MEDICINE

## 2022-02-17 PROCEDURE — 99214 PR OFFICE/OUTPT VISIT, EST, LEVL IV, 30-39 MIN: ICD-10-PCS | Mod: S$GLB,,, | Performed by: FAMILY MEDICINE

## 2022-02-17 PROCEDURE — 1101F PT FALLS ASSESS-DOCD LE1/YR: CPT | Mod: CPTII,S$GLB,, | Performed by: FAMILY MEDICINE

## 2022-02-17 PROCEDURE — 1101F PR PT FALLS ASSESS DOC 0-1 FALLS W/OUT INJ PAST YR: ICD-10-PCS | Mod: CPTII,S$GLB,, | Performed by: FAMILY MEDICINE

## 2022-02-17 PROCEDURE — 1159F MED LIST DOCD IN RCRD: CPT | Mod: CPTII,S$GLB,, | Performed by: FAMILY MEDICINE

## 2022-02-17 PROCEDURE — 99214 OFFICE O/P EST MOD 30 MIN: CPT | Mod: S$GLB,,, | Performed by: FAMILY MEDICINE

## 2022-02-17 PROCEDURE — 3008F BODY MASS INDEX DOCD: CPT | Mod: CPTII,S$GLB,, | Performed by: FAMILY MEDICINE

## 2022-02-18 NOTE — PROGRESS NOTES
Cataract left eye.  Dr. Smith doing surgery.  Needs clearance.  Also has noted for off the urine.  Went for annual wellness exam and they recommended that she have it checked by me.  Urinalysis here today.  Only 1+ for leukocytes and trace protein.  In general feeling well.  ENT no nasal congestion postnasal drip sore throat.  No loose teeth.  Pulmonary no cough or sputum no dyspnea.  Cardiovascular no PND orthopnea or chest pain.  GI no nausea vomiting diarrhea melena hematemesis.  Hematologic no bleeding bruising or clotting problems.    Physical examination vital signs noted.  Pupils are equal round regular active light accommodation tympanic membranes without erythema oral mucosa moist.  Neck without bruit.  No adenopathy.  Chest clear.  Heart regular rate rhythm.  Abdomen bowel sounds positive soft nontender.  Extremities without edema positive pedal pulses.     Impression.  Cataract OS.  Hypothyroidism.  Hyperlipidemia.  Aspirin use.    Plan shingles vaccine recommended at pharmacy.  Urine culture and sensitivity.  Okay for the cataract surgery low risk.  Forms completed.  Urinalysis and urine culture and sensitivity.

## 2022-02-20 LAB — BACTERIA UR CULT: NORMAL

## 2022-02-24 PROBLEM — M79.672 LEFT FOOT PAIN: Status: RESOLVED | Noted: 2019-02-21 | Resolved: 2022-02-24

## 2022-02-24 PROBLEM — Z78.0 MENOPAUSE: Status: RESOLVED | Noted: 2021-10-29 | Resolved: 2022-02-24

## 2022-06-06 ENCOUNTER — OFFICE VISIT (OUTPATIENT)
Dept: ORTHOPEDICS | Facility: CLINIC | Age: 72
End: 2022-06-06
Payer: MEDICARE

## 2022-06-06 VITALS — WEIGHT: 140 LBS | BODY MASS INDEX: 22.5 KG/M2 | HEIGHT: 66 IN

## 2022-06-06 DIAGNOSIS — M17.11 PRIMARY OSTEOARTHRITIS OF RIGHT KNEE: ICD-10-CM

## 2022-06-06 DIAGNOSIS — M17.12 PRIMARY OSTEOARTHRITIS OF LEFT KNEE: ICD-10-CM

## 2022-06-06 DIAGNOSIS — M16.12 PRIMARY OSTEOARTHRITIS OF LEFT HIP: Primary | ICD-10-CM

## 2022-06-06 PROCEDURE — 1125F PR PAIN SEVERITY QUANTIFIED, PAIN PRESENT: ICD-10-PCS | Mod: CPTII,S$GLB,, | Performed by: PHYSICIAN ASSISTANT

## 2022-06-06 PROCEDURE — 1125F AMNT PAIN NOTED PAIN PRSNT: CPT | Mod: CPTII,S$GLB,, | Performed by: PHYSICIAN ASSISTANT

## 2022-06-06 PROCEDURE — 20610 DRAIN/INJ JOINT/BURSA W/O US: CPT | Mod: 50,S$GLB,, | Performed by: PHYSICIAN ASSISTANT

## 2022-06-06 PROCEDURE — 3288F PR FALLS RISK ASSESSMENT DOCUMENTED: ICD-10-PCS | Mod: CPTII,S$GLB,, | Performed by: PHYSICIAN ASSISTANT

## 2022-06-06 PROCEDURE — 99213 OFFICE O/P EST LOW 20 MIN: CPT | Mod: 25,S$GLB,, | Performed by: PHYSICIAN ASSISTANT

## 2022-06-06 PROCEDURE — 99213 PR OFFICE/OUTPT VISIT, EST, LEVL III, 20-29 MIN: ICD-10-PCS | Mod: 25,S$GLB,, | Performed by: PHYSICIAN ASSISTANT

## 2022-06-06 PROCEDURE — 3008F BODY MASS INDEX DOCD: CPT | Mod: CPTII,S$GLB,, | Performed by: PHYSICIAN ASSISTANT

## 2022-06-06 PROCEDURE — 1159F MED LIST DOCD IN RCRD: CPT | Mod: CPTII,S$GLB,, | Performed by: PHYSICIAN ASSISTANT

## 2022-06-06 PROCEDURE — 20610 LARGE JOINT ASPIRATION/INJECTION: R KNEE: ICD-10-PCS | Mod: 50,S$GLB,, | Performed by: PHYSICIAN ASSISTANT

## 2022-06-06 PROCEDURE — 3288F FALL RISK ASSESSMENT DOCD: CPT | Mod: CPTII,S$GLB,, | Performed by: PHYSICIAN ASSISTANT

## 2022-06-06 PROCEDURE — 1159F PR MEDICATION LIST DOCUMENTED IN MEDICAL RECORD: ICD-10-PCS | Mod: CPTII,S$GLB,, | Performed by: PHYSICIAN ASSISTANT

## 2022-06-06 PROCEDURE — 1160F RVW MEDS BY RX/DR IN RCRD: CPT | Mod: CPTII,S$GLB,, | Performed by: PHYSICIAN ASSISTANT

## 2022-06-06 PROCEDURE — 1101F PT FALLS ASSESS-DOCD LE1/YR: CPT | Mod: CPTII,S$GLB,, | Performed by: PHYSICIAN ASSISTANT

## 2022-06-06 PROCEDURE — 1160F PR REVIEW ALL MEDS BY PRESCRIBER/CLIN PHARMACIST DOCUMENTED: ICD-10-PCS | Mod: CPTII,S$GLB,, | Performed by: PHYSICIAN ASSISTANT

## 2022-06-06 PROCEDURE — 1101F PR PT FALLS ASSESS DOC 0-1 FALLS W/OUT INJ PAST YR: ICD-10-PCS | Mod: CPTII,S$GLB,, | Performed by: PHYSICIAN ASSISTANT

## 2022-06-06 PROCEDURE — 3008F PR BODY MASS INDEX (BMI) DOCUMENTED: ICD-10-PCS | Mod: CPTII,S$GLB,, | Performed by: PHYSICIAN ASSISTANT

## 2022-06-06 RX ORDER — TRIAMCINOLONE ACETONIDE 40 MG/ML
40 INJECTION, SUSPENSION INTRA-ARTICULAR; INTRAMUSCULAR
Status: DISCONTINUED | OUTPATIENT
Start: 2022-06-06 | End: 2022-06-06 | Stop reason: HOSPADM

## 2022-06-06 RX ADMIN — TRIAMCINOLONE ACETONIDE 40 MG: 40 INJECTION, SUSPENSION INTRA-ARTICULAR; INTRAMUSCULAR at 01:06

## 2022-06-06 NOTE — PROGRESS NOTES
[facility]  [address]  [place] [address]  Phone: [phone]   [phone]    Patient's PCP: Rubens Tinajero III, MD  Referring Provider: No ref. provider found    Subjective:      Chief Complaint:   Chief Complaint   Patient presents with    Left Hip - Pain     Pain at groin area, no radiating pain, pain is more when she is inactive    Left Knee - Pain     B/l knee pain, has previous injections to knees , which helps, last injected last July    Right Knee - Pain       Past Medical History:   Diagnosis Date    Hypercholesterolemia     Thyroid disease        Past Surgical History:   Procedure Laterality Date    APPENDECTOMY      breast augmentation      CHEILECTOMY      HYSTERECTOMY         Current Outpatient Medications   Medication Sig    aspirin (ECOTRIN) 81 MG EC tablet Take 81 mg by mouth once daily.    atorvastatin (LIPITOR) 20 MG tablet Take 1 tablet (20 mg total) by mouth once daily.    co-enzyme Q-10 30 mg capsule Take 30 mg by mouth 3 (three) times daily.    levothyroxine (SYNTHROID) 88 MCG tablet Take 1 tablet (88 mcg total) by mouth once daily.     No current facility-administered medications for this visit.       Review of patient's allergies indicates:  No Known Allergies    Family History   Problem Relation Age of Onset    Breast cancer Mother 82    No Known Problems Daughter     Melanoma Neg Hx        Social History     Socioeconomic History    Marital status:    Tobacco Use    Smoking status: Never Smoker    Smokeless tobacco: Never Used   Substance and Sexual Activity    Alcohol use: Yes       History of present illness:  Olivia comes in today for follow-up for her left hip OA, and bilateral knee OA.  She was last seen and injected her bilateral knees approximately 10 months ago with good relief of her symptoms.  Unfortunately, the pain has begun to return.  She is here today requesting repeat injections in both knees.    Review of  Systems:    Constitutional: Negative for chills, fever and weight loss.   HENT: Negative for congestion.    Eyes: Negative for discharge and redness.   Respiratory: Negative for cough and shortness of breath.    Cardiovascular: Negative for chest pain.   Gastrointestinal: Negative for nausea and vomiting.   Musculoskeletal: See HPI.   Skin: Negative for rash.   Neurological: Negative for headaches.   Endo/Heme/Allergies: Does not bruise/bleed easily.   Psychiatric/Behavioral: The patient is not nervous/anxious.    All other systems reviewed and are negative.       Objective:      Physical Examination:    Vital Signs:  There were no vitals filed for this visit.    Body mass index is 22.6 kg/m².    This a well-developed, well nourished patient in no acute distress.  They are alert and oriented and cooperative to examination.     Bilateral knee exam: Her exam is essentially unchanged where she was 10 months ago at her last visit.  Bilateral knee range of motion is approximately 0-125 degrees. Bilateral knees are stable to varus and valgus stresses while held in extension.  There is crepitus with range of motion and patient is neurovascularly intact into both lower extremities.  She has no effusion bilaterally. Skin is clean dry and intact bilaterally.   there is no erythema or ecchymosis to bilateral knees.  Bilateral and knees have no signs or symptoms of infection.  She is able weightbear as tolerated bilaterally and has a nonantalgic gait.      Left hip exam:  Skin left hip is clean dry and intact.  There is no erythema or ecchymosis.  There are no signs or symptoms of infection.  Patient has decreased range of motion with internal/external rotation of left hip.  She has increased pain with attempts to doing so.  She has a negative straight leg raise on the left.  Left calf is soft and nontender.  Patient w,can eight bear as tolerated on bilateral lower extremities.    Pertinent New Results:        XRAY Report /  Interpretation:   Two views were taken of the left hip today:  AP and lateral views.  They reveal no acute fractures or dislocations.  Visualized soft tissues are unremarkable.  Patient does have severe arthrosis in the left hip joint with loss of the femoral acetabular space and bone-on-bone deformity.  She has multiple osteophytes present on the acetabulum and femoral head.  She also has subchondral cyst in the acetabulum.    Three views were taken of the bilateral knees today:  AP, lateral, and sunrise views.  Patient does have moderate tricompartmental arthrosis throughout her bilateral knees.  Visualized soft tissues are unremarkable.  There are no acute fractures or dislocations seen.      Assessment:       1. Primary osteoarthritis of left hip    2. Primary osteoarthritis of left knee    3. Primary osteoarthritis of right knee      Plan:     Primary osteoarthritis of left hip  -     X-Ray Hip 2 or 3 views Left (with Pelvis when performed)    Primary osteoarthritis of left knee  -     X-Ray Knee 3 View Bilateral  -     Large Joint Aspiration/Injection: L knee    Primary osteoarthritis of right knee  -     X-Ray Knee 3 View Bilateral  -     Large Joint Aspiration/Injection: R knee    Other orders  -     Cancel: Large Joint Aspiration/Injection        Follow up if symptoms worsen or fail to improve.    I injected her bilateral knees today via an anterior lateral approach 40 mg of Kenalog and lidocaine respectively.  She tolerated these injections well.  Also discussed with her the definitive treatment recommendation for left hip would be a total hip arthroplasty.  She reports the pain is tolerable for her she is able to be independent in her activities.  I did advise her she could attempt a intra-articular injection her left hip with Interventional Radiology if she would like at some point in the future.  She will think this over and follow up with us on an as-needed basis for repeat injections in her bilateral  knees and if she would like to schedule an intra-articular injection for her left hip.        Boyd White, MPAS, PA-C    This note was created using [employer] voice recognition software that occasionally misinterprets words or phrases.

## 2022-06-06 NOTE — PROCEDURES
Large Joint Aspiration/Injection: R knee    Date/Time: 6/6/2022 1:00 PM  Performed by: Boyd White PA-C  Authorized by: Boyd White PA-C     Consent Done?:  Yes (Verbal)  Indications:  Pain  Site marked: the procedure site was marked    Timeout: prior to procedure the correct patient, procedure, and site was verified    Prep: patient was prepped and draped in usual sterile fashion      Local anesthesia used?: Yes    Local anesthetic:  Lidocaine 1% without epinephrine    Details:  Needle Size:  25 G  Ultrasonic Guidance for needle placement?: No    Location:  Knee  Site:  R knee  Medications:  40 mg triamcinolone acetonide 40 mg/mL  Patient tolerance:  Patient tolerated the procedure well with no immediate complications  Large Joint Aspiration/Injection: L knee    Date/Time: 6/6/2022 1:00 PM  Performed by: Boyd White PA-C  Authorized by: Boyd White PA-C     Consent Done?:  Yes (Verbal)  Indications:  Pain  Site marked: the procedure site was marked    Timeout: prior to procedure the correct patient, procedure, and site was verified    Prep: patient was prepped and draped in usual sterile fashion      Local anesthesia used?: Yes    Local anesthetic:  Lidocaine 1% without epinephrine    Details:  Needle Size:  25 G  Ultrasonic Guidance for needle placement?: No    Location:  Knee  Site:  L knee  Medications:  40 mg triamcinolone acetonide 40 mg/mL  Patient tolerance:  Patient tolerated the procedure well with no immediate complications

## 2022-07-07 ENCOUNTER — TELEPHONE (OUTPATIENT)
Dept: FAMILY MEDICINE | Facility: CLINIC | Age: 72
End: 2022-07-07
Payer: MEDICARE

## 2022-07-07 DIAGNOSIS — Z12.31 SCREENING MAMMOGRAM FOR BREAST CANCER: Primary | ICD-10-CM

## 2022-07-07 NOTE — TELEPHONE ENCOUNTER
----- Message from Fred Benz sent at 7/7/2022  1:28 PM CDT -----  Contact: Self  Type:  Mammogram    Caller is requesting to schedule their annual mammogram appointment.  Order is not listed in EPIC.  Please enter order and contact patient to schedule.    Name of Caller:  :Patient  Where would they like the mammogram performed?  [facility]  Best Call Back Number:  [phone]   Additional Information:      done

## 2022-07-26 ENCOUNTER — IMMUNIZATION (OUTPATIENT)
Dept: PRIMARY CARE CLINIC | Facility: CLINIC | Age: 72
End: 2022-07-26
Payer: MEDICARE

## 2022-07-26 DIAGNOSIS — Z23 NEED FOR VACCINATION: Primary | ICD-10-CM

## 2022-07-26 PROCEDURE — 91305 COVID-19, MRNA, LNP-S, PF, 30 MCG/0.3 ML DOSE VACCINE (PFIZER): CPT | Mod: S$GLB,,, | Performed by: FAMILY MEDICINE

## 2022-07-26 PROCEDURE — 0054A COVID-19, MRNA, LNP-S, PF, 30 MCG/0.3 ML DOSE VACCINE (PFIZER): ICD-10-PCS | Mod: S$GLB,,, | Performed by: FAMILY MEDICINE

## 2022-07-26 PROCEDURE — 0054A COVID-19, MRNA, LNP-S, PF, 30 MCG/0.3 ML DOSE VACCINE (PFIZER): CPT | Mod: S$GLB,,, | Performed by: FAMILY MEDICINE

## 2022-07-26 PROCEDURE — 91305 COVID-19, MRNA, LNP-S, PF, 30 MCG/0.3 ML DOSE VACCINE (PFIZER): ICD-10-PCS | Mod: S$GLB,,, | Performed by: FAMILY MEDICINE

## 2022-07-28 ENCOUNTER — HOSPITAL ENCOUNTER (OUTPATIENT)
Dept: RADIOLOGY | Facility: HOSPITAL | Age: 72
Discharge: HOME | End: 2022-07-28
Attending: FAMILY MEDICINE
Payer: MEDICARE

## 2022-07-28 DIAGNOSIS — Z12.31 SCREENING MAMMOGRAM FOR BREAST CANCER: ICD-10-CM

## 2022-07-28 PROCEDURE — 77063 BREAST TOMOSYNTHESIS BI: CPT | Mod: TC,PO

## 2022-08-01 ENCOUNTER — TELEPHONE (OUTPATIENT)
Dept: FAMILY MEDICINE | Facility: CLINIC | Age: 72
End: 2022-08-01
Payer: MEDICARE

## 2022-08-01 DIAGNOSIS — E03.9 HYPOTHYROIDISM, UNSPECIFIED TYPE: ICD-10-CM

## 2022-08-01 DIAGNOSIS — E78.5 HYPERLIPIDEMIA, UNSPECIFIED HYPERLIPIDEMIA TYPE: Primary | ICD-10-CM

## 2022-08-01 NOTE — TELEPHONE ENCOUNTER
Done pt advised  ----- Message from Daina Willett MA sent at 8/1/2022 11:37 AM CDT -----  Contact: VLADISLAV MORSE [[id number]]  Type: Needs Medical Advice    Who Called:VLADISLAV MORSE [[id number]]  Best Call Back Number: [phone]  Inquiry/Question: Need blood work order for [facility]      Thank you~         
Cholecystitis

## 2022-08-09 LAB
ALBUMIN SERPL-MCNC: 4.1 G/DL (ref 3.6–5.1)
ALBUMIN/GLOB SERPL: 1.6 (CALC) (ref 1–2.5)
ALP SERPL-CCNC: 45 U/L (ref 37–153)
ALT SERPL-CCNC: 17 U/L (ref 6–29)
AST SERPL-CCNC: 19 U/L (ref 10–35)
BASOPHILS # BLD AUTO: 49 CELLS/UL (ref 0–200)
BASOPHILS NFR BLD AUTO: 0.7 %
BILIRUB SERPL-MCNC: 0.4 MG/DL (ref 0.2–1.2)
BUN SERPL-MCNC: 20 MG/DL (ref 7–25)
BUN/CREAT SERPL: ABNORMAL (CALC) (ref 6–22)
CALCIUM SERPL-MCNC: 9.3 MG/DL (ref 8.6–10.4)
CHLORIDE SERPL-SCNC: 110 MMOL/L (ref 98–110)
CHOLEST SERPL-MCNC: 199 MG/DL
CHOLEST/HDLC SERPL: 2.4 (CALC)
CO2 SERPL-SCNC: 30 MMOL/L (ref 20–32)
CREAT SERPL-MCNC: 0.82 MG/DL (ref 0.6–1)
EGFR: 75 ML/MIN/1.73M2
EOSINOPHIL # BLD AUTO: 140 CELLS/UL (ref 15–500)
EOSINOPHIL NFR BLD AUTO: 2 %
ERYTHROCYTE [DISTWIDTH] IN BLOOD BY AUTOMATED COUNT: 12.6 % (ref 11–15)
GLOBULIN SER CALC-MCNC: 2.6 G/DL (CALC) (ref 1.9–3.7)
GLUCOSE SERPL-MCNC: 103 MG/DL (ref 65–99)
HCT VFR BLD AUTO: 40.6 % (ref 35–45)
HDLC SERPL-MCNC: 82 MG/DL
HGB BLD-MCNC: 13 G/DL (ref 11.7–15.5)
LDLC SERPL CALC-MCNC: 101 MG/DL (CALC)
LYMPHOCYTES # BLD AUTO: 2128 CELLS/UL (ref 850–3900)
LYMPHOCYTES NFR BLD AUTO: 30.4 %
MCH RBC QN AUTO: 27.6 PG (ref 27–33)
MCHC RBC AUTO-ENTMCNC: 32 G/DL (ref 32–36)
MCV RBC AUTO: 86.2 FL (ref 80–100)
MONOCYTES # BLD AUTO: 441 CELLS/UL (ref 200–950)
MONOCYTES NFR BLD AUTO: 6.3 %
NEUTROPHILS # BLD AUTO: 4242 CELLS/UL (ref 1500–7800)
NEUTROPHILS NFR BLD AUTO: 60.6 %
NONHDLC SERPL-MCNC: 117 MG/DL (CALC)
PLATELET # BLD AUTO: 272 THOUSAND/UL (ref 140–400)
PMV BLD REES-ECKER: 11.6 FL (ref 7.5–12.5)
POTASSIUM SERPL-SCNC: 4.5 MMOL/L (ref 3.5–5.3)
PROT SERPL-MCNC: 6.7 G/DL (ref 6.1–8.1)
RBC # BLD AUTO: 4.71 MILLION/UL (ref 3.8–5.1)
SODIUM SERPL-SCNC: 144 MMOL/L (ref 135–146)
TRIGL SERPL-MCNC: 73 MG/DL
TSH SERPL-ACNC: 1.38 MIU/L (ref 0.4–4.5)
WBC # BLD AUTO: 7 THOUSAND/UL (ref 3.8–10.8)

## 2022-08-11 ENCOUNTER — OFFICE VISIT (OUTPATIENT)
Dept: FAMILY MEDICINE | Facility: CLINIC | Age: 72
End: 2022-08-11
Payer: MEDICARE

## 2022-08-11 VITALS
DIASTOLIC BLOOD PRESSURE: 78 MMHG | SYSTOLIC BLOOD PRESSURE: 120 MMHG | HEIGHT: 66 IN | BODY MASS INDEX: 22.64 KG/M2 | TEMPERATURE: 98 F | HEART RATE: 81 BPM | RESPIRATION RATE: 18 BRPM | WEIGHT: 140.88 LBS | OXYGEN SATURATION: 97 %

## 2022-08-11 DIAGNOSIS — Z79.82 ASPIRIN LONG-TERM USE: ICD-10-CM

## 2022-08-11 DIAGNOSIS — E78.5 HYPERLIPIDEMIA, UNSPECIFIED HYPERLIPIDEMIA TYPE: ICD-10-CM

## 2022-08-11 DIAGNOSIS — E03.9 HYPOTHYROIDISM, UNSPECIFIED TYPE: Primary | ICD-10-CM

## 2022-08-11 PROCEDURE — 3074F PR MOST RECENT SYSTOLIC BLOOD PRESSURE < 130 MM HG: ICD-10-PCS | Mod: CPTII,S$GLB,, | Performed by: FAMILY MEDICINE

## 2022-08-11 PROCEDURE — 1159F MED LIST DOCD IN RCRD: CPT | Mod: CPTII,S$GLB,, | Performed by: FAMILY MEDICINE

## 2022-08-11 PROCEDURE — 1126F AMNT PAIN NOTED NONE PRSNT: CPT | Mod: CPTII,S$GLB,, | Performed by: FAMILY MEDICINE

## 2022-08-11 PROCEDURE — 1101F PT FALLS ASSESS-DOCD LE1/YR: CPT | Mod: CPTII,S$GLB,, | Performed by: FAMILY MEDICINE

## 2022-08-11 PROCEDURE — 3078F PR MOST RECENT DIASTOLIC BLOOD PRESSURE < 80 MM HG: ICD-10-PCS | Mod: CPTII,S$GLB,, | Performed by: FAMILY MEDICINE

## 2022-08-11 PROCEDURE — 1160F PR REVIEW ALL MEDS BY PRESCRIBER/CLIN PHARMACIST DOCUMENTED: ICD-10-PCS | Mod: CPTII,S$GLB,, | Performed by: FAMILY MEDICINE

## 2022-08-11 PROCEDURE — 1101F PR PT FALLS ASSESS DOC 0-1 FALLS W/OUT INJ PAST YR: ICD-10-PCS | Mod: CPTII,S$GLB,, | Performed by: FAMILY MEDICINE

## 2022-08-11 PROCEDURE — 1160F RVW MEDS BY RX/DR IN RCRD: CPT | Mod: CPTII,S$GLB,, | Performed by: FAMILY MEDICINE

## 2022-08-11 PROCEDURE — 99214 OFFICE O/P EST MOD 30 MIN: CPT | Mod: S$GLB,,, | Performed by: FAMILY MEDICINE

## 2022-08-11 PROCEDURE — 3288F PR FALLS RISK ASSESSMENT DOCUMENTED: ICD-10-PCS | Mod: CPTII,S$GLB,, | Performed by: FAMILY MEDICINE

## 2022-08-11 PROCEDURE — 3074F SYST BP LT 130 MM HG: CPT | Mod: CPTII,S$GLB,, | Performed by: FAMILY MEDICINE

## 2022-08-11 PROCEDURE — 1159F PR MEDICATION LIST DOCUMENTED IN MEDICAL RECORD: ICD-10-PCS | Mod: CPTII,S$GLB,, | Performed by: FAMILY MEDICINE

## 2022-08-11 PROCEDURE — 3008F PR BODY MASS INDEX (BMI) DOCUMENTED: ICD-10-PCS | Mod: CPTII,S$GLB,, | Performed by: FAMILY MEDICINE

## 2022-08-11 PROCEDURE — 99214 PR OFFICE/OUTPT VISIT, EST, LEVL IV, 30-39 MIN: ICD-10-PCS | Mod: S$GLB,,, | Performed by: FAMILY MEDICINE

## 2022-08-11 PROCEDURE — 3078F DIAST BP <80 MM HG: CPT | Mod: CPTII,S$GLB,, | Performed by: FAMILY MEDICINE

## 2022-08-11 PROCEDURE — 3008F BODY MASS INDEX DOCD: CPT | Mod: CPTII,S$GLB,, | Performed by: FAMILY MEDICINE

## 2022-08-11 PROCEDURE — 3288F FALL RISK ASSESSMENT DOCD: CPT | Mod: CPTII,S$GLB,, | Performed by: FAMILY MEDICINE

## 2022-08-11 PROCEDURE — 1126F PR PAIN SEVERITY QUANTIFIED, NO PAIN PRESENT: ICD-10-PCS | Mod: CPTII,S$GLB,, | Performed by: FAMILY MEDICINE

## 2022-08-11 RX ORDER — ATORVASTATIN CALCIUM 20 MG/1
20 TABLET, FILM COATED ORAL DAILY
Qty: 90 TABLET | Refills: 2 | Status: SHIPPED | OUTPATIENT
Start: 2022-08-11 | End: 2022-10-24

## 2022-08-11 RX ORDER — LEVOTHYROXINE SODIUM 88 UG/1
88 TABLET ORAL DAILY
Qty: 90 TABLET | Refills: 2 | Status: SHIPPED | OUTPATIENT
Start: 2022-08-11 | End: 2023-08-11 | Stop reason: SDUPTHER

## 2022-08-11 NOTE — PROGRESS NOTES
,  Subjective:       Patient ID: Olivia Mix is a 72 y.o. female.    Chief Complaint: Follow-up and Medication Refill    HPI   Patient presents to clinic for follow up. Exercises daily. Weight is stable. BMI 22. Cardiovascular ok. Denies chest pain or palpitations. Aspirin use. Hypothyroidism. TSH 1.38. Refill Synthroid. Hyperlipidemia. Cholesterol 199 HDL 82 . Refill Crestor. Glucose slightly elevated-103.  CBC normal. No anemia. Due for shingles vaccinations. Has had 4 Covid vaccinations. Due for Tetanus vaccination.   Review of Systems   Respiratory: Negative.    Cardiovascular: Negative.  Negative for chest pain and palpitations.   Hematological: Negative.    Psychiatric/Behavioral: Negative.          Objective:      Physical Exam  Constitutional:       Appearance: Normal appearance.   Neck:      Vascular: No carotid bruit.   Cardiovascular:      Rate and Rhythm: Normal rate and regular rhythm.      Pulses: Normal pulses.      Heart sounds: Normal heart sounds.   Pulmonary:      Effort: Pulmonary effort is normal.      Breath sounds: Normal breath sounds.   Lymphadenopathy:      Cervical: No cervical adenopathy.   Skin:     General: Skin is warm and dry.   Neurological:      Mental Status: She is alert.   Psychiatric:         Mood and Affect: Mood normal.         Behavior: Behavior normal.         Assessment/Plan:     Hypothyroidism, unspecified type    Aspirin long-term use    Hyperlipidemia, unspecified hyperlipidemia type    BMI 22.0-22.9, adult    Other orders  -     levothyroxine (SYNTHROID) 88 MCG tablet; Take 1 tablet (88 mcg total) by mouth once daily.  Dispense: 90 tablet; Refill: 2  -     atorvastatin (LIPITOR) 20 MG tablet; Take 1 tablet (20 mg total) by mouth once daily.  Dispense: 90 tablet; Refill: 2     hypothyroidism it under good control.  Using her aspirin regularly.  Hyperlipidemia controlled.  BMI is stable.  Continue her exercise program.  Refill her medications paper scripts.   Shingles vaccine recommended.  Tetanus diptheria vaccine recommended.  Follow-up in 6 months.

## 2022-11-28 ENCOUNTER — OFFICE VISIT (OUTPATIENT)
Dept: ORTHOPEDICS | Facility: CLINIC | Age: 72
End: 2022-11-28
Payer: MEDICARE

## 2022-11-28 VITALS
HEIGHT: 66 IN | BODY MASS INDEX: 22.18 KG/M2 | DIASTOLIC BLOOD PRESSURE: 66 MMHG | SYSTOLIC BLOOD PRESSURE: 128 MMHG | WEIGHT: 138 LBS

## 2022-11-28 DIAGNOSIS — M17.11 PRIMARY OSTEOARTHRITIS OF RIGHT KNEE: ICD-10-CM

## 2022-11-28 DIAGNOSIS — M16.12 PRIMARY OSTEOARTHRITIS OF LEFT HIP: Primary | ICD-10-CM

## 2022-11-28 DIAGNOSIS — M17.12 PRIMARY OSTEOARTHRITIS OF LEFT KNEE: ICD-10-CM

## 2022-11-28 PROCEDURE — 1125F PR PAIN SEVERITY QUANTIFIED, PAIN PRESENT: ICD-10-PCS | Mod: CPTII,S$GLB,, | Performed by: PHYSICIAN ASSISTANT

## 2022-11-28 PROCEDURE — 1160F RVW MEDS BY RX/DR IN RCRD: CPT | Mod: CPTII,S$GLB,, | Performed by: PHYSICIAN ASSISTANT

## 2022-11-28 PROCEDURE — 3008F BODY MASS INDEX DOCD: CPT | Mod: CPTII,S$GLB,, | Performed by: PHYSICIAN ASSISTANT

## 2022-11-28 PROCEDURE — 1160F PR REVIEW ALL MEDS BY PRESCRIBER/CLIN PHARMACIST DOCUMENTED: ICD-10-PCS | Mod: CPTII,S$GLB,, | Performed by: PHYSICIAN ASSISTANT

## 2022-11-28 PROCEDURE — 3288F FALL RISK ASSESSMENT DOCD: CPT | Mod: CPTII,S$GLB,, | Performed by: PHYSICIAN ASSISTANT

## 2022-11-28 PROCEDURE — 3074F SYST BP LT 130 MM HG: CPT | Mod: CPTII,S$GLB,, | Performed by: PHYSICIAN ASSISTANT

## 2022-11-28 PROCEDURE — 1101F PT FALLS ASSESS-DOCD LE1/YR: CPT | Mod: CPTII,S$GLB,, | Performed by: PHYSICIAN ASSISTANT

## 2022-11-28 PROCEDURE — 20610 DRAIN/INJ JOINT/BURSA W/O US: CPT | Mod: 50,S$GLB,, | Performed by: PHYSICIAN ASSISTANT

## 2022-11-28 PROCEDURE — 99213 PR OFFICE/OUTPT VISIT, EST, LEVL III, 20-29 MIN: ICD-10-PCS | Mod: 25,S$GLB,, | Performed by: PHYSICIAN ASSISTANT

## 2022-11-28 PROCEDURE — 3288F PR FALLS RISK ASSESSMENT DOCUMENTED: ICD-10-PCS | Mod: CPTII,S$GLB,, | Performed by: PHYSICIAN ASSISTANT

## 2022-11-28 PROCEDURE — 1159F MED LIST DOCD IN RCRD: CPT | Mod: CPTII,S$GLB,, | Performed by: PHYSICIAN ASSISTANT

## 2022-11-28 PROCEDURE — 1159F PR MEDICATION LIST DOCUMENTED IN MEDICAL RECORD: ICD-10-PCS | Mod: CPTII,S$GLB,, | Performed by: PHYSICIAN ASSISTANT

## 2022-11-28 PROCEDURE — 1125F AMNT PAIN NOTED PAIN PRSNT: CPT | Mod: CPTII,S$GLB,, | Performed by: PHYSICIAN ASSISTANT

## 2022-11-28 PROCEDURE — 3074F PR MOST RECENT SYSTOLIC BLOOD PRESSURE < 130 MM HG: ICD-10-PCS | Mod: CPTII,S$GLB,, | Performed by: PHYSICIAN ASSISTANT

## 2022-11-28 PROCEDURE — 3008F PR BODY MASS INDEX (BMI) DOCUMENTED: ICD-10-PCS | Mod: CPTII,S$GLB,, | Performed by: PHYSICIAN ASSISTANT

## 2022-11-28 PROCEDURE — 3078F PR MOST RECENT DIASTOLIC BLOOD PRESSURE < 80 MM HG: ICD-10-PCS | Mod: CPTII,S$GLB,, | Performed by: PHYSICIAN ASSISTANT

## 2022-11-28 PROCEDURE — 20610 LARGE JOINT ASPIRATION/INJECTION: R KNEE: ICD-10-PCS | Mod: 50,S$GLB,, | Performed by: PHYSICIAN ASSISTANT

## 2022-11-28 PROCEDURE — 99213 OFFICE O/P EST LOW 20 MIN: CPT | Mod: 25,S$GLB,, | Performed by: PHYSICIAN ASSISTANT

## 2022-11-28 PROCEDURE — 3078F DIAST BP <80 MM HG: CPT | Mod: CPTII,S$GLB,, | Performed by: PHYSICIAN ASSISTANT

## 2022-11-28 PROCEDURE — 1101F PR PT FALLS ASSESS DOC 0-1 FALLS W/OUT INJ PAST YR: ICD-10-PCS | Mod: CPTII,S$GLB,, | Performed by: PHYSICIAN ASSISTANT

## 2022-11-28 RX ORDER — TRIAMCINOLONE ACETONIDE 40 MG/ML
40 INJECTION, SUSPENSION INTRA-ARTICULAR; INTRAMUSCULAR
Status: DISCONTINUED | OUTPATIENT
Start: 2022-11-28 | End: 2022-11-28 | Stop reason: HOSPADM

## 2022-11-28 RX ADMIN — TRIAMCINOLONE ACETONIDE 40 MG: 40 INJECTION, SUSPENSION INTRA-ARTICULAR; INTRAMUSCULAR at 01:11

## 2022-11-28 NOTE — PROCEDURES
Large Joint Aspiration/Injection: L knee    Date/Time: 11/28/2022 1:45 PM  Performed by: Boyd White PA-C  Authorized by: Boyd White PA-C     Consent Done?:  Yes (Verbal)  Indications:  Arthritis and pain  Site marked: the procedure site was marked    Timeout: prior to procedure the correct patient, procedure, and site was verified    Prep: patient was prepped and draped in usual sterile fashion      Local anesthesia used?: Yes    Local anesthetic:  Lidocaine 1% without epinephrine    Details:  Needle Size:  25 G  Ultrasonic Guidance for needle placement?: No    Location:  Knee  Site:  L knee  Medications:  40 mg triamcinolone acetonide 40 mg/mL  Patient tolerance:  Patient tolerated the procedure well with no immediate complications

## 2022-11-28 NOTE — PROGRESS NOTES
[facility]  [address]  [place] [address]  Phone: [phone]   [phone]    Patient's PCP: Rubens Tinajero III, MD  Referring Provider: No ref. provider found    Subjective:      Chief Complaint:   Chief Complaint   Patient presents with    Right Knee - Pain     Patient is here for a f/up on bilateral knee injections 6/6/22, injections have worn off and wants to get more today.     Left Knee - Pain    Left Hip - Pain     Left hip/groin pain, worse at night, would like to get order in for a IA injection.        Past Medical History:   Diagnosis Date    Hypercholesterolemia     Thyroid disease        Past Surgical History:   Procedure Laterality Date    APPENDECTOMY      breast augmentation      CHEILECTOMY      HYSTERECTOMY         Current Outpatient Medications   Medication Sig    aspirin (ECOTRIN) 81 MG EC tablet Take 81 mg by mouth once daily.    atorvastatin (LIPITOR) 20 MG tablet TAKE 1 TABLET BY MOUTH EVERY DAY    co-enzyme Q-10 30 mg capsule Take 30 mg by mouth 3 (three) times daily.    levothyroxine (SYNTHROID) 88 MCG tablet Take 1 tablet (88 mcg total) by mouth once daily.     No current facility-administered medications for this visit.       Review of patient's allergies indicates:  No Known Allergies    Family History   Problem Relation Age of Onset    Breast cancer Mother 82    No Known Problems Daughter     Melanoma Neg Hx        Social History     Socioeconomic History    Marital status:    Tobacco Use    Smoking status: Never    Smokeless tobacco: Never   Substance and Sexual Activity    Alcohol use: Yes       History of present illness:  Ms. Baker comes in today follow-up for her bilateral knee arthritis in her left hip osteoarthritis.  She was last seen and injected in her bilateral knees approximately 6 months ago with good relief of her symptoms.  Unfortunately, the pain has returned here recently.  She denies any new injury or trauma.  She is requesting  repeat injections in her knees.  As far as her left hip is concerned, we have discussed total hip arthroplasty as well as intra-articular corticosteroid injection.  She reports that she would like to try with a corticosteroid injection.  She is requesting that we order that today.    Review of Systems:    Constitutional: Negative for chills, fever and weight loss.   HENT: Negative for congestion.    Eyes: Negative for discharge and redness.   Respiratory: Negative for cough and shortness of breath.    Cardiovascular: Negative for chest pain.   Gastrointestinal: Negative for nausea and vomiting.   Musculoskeletal: See HPI.   Skin: Negative for rash.   Neurological: Negative for headaches.   Endo/Heme/Allergies: Does not bruise/bleed easily.   Psychiatric/Behavioral: The patient is not nervous/anxious.    All other systems reviewed and are negative.       Objective:      Physical Examination:    Vital Signs:    Vitals:    11/28/22 1347   BP: 128/66       Body mass index is 22.27 kg/m².    This a well-developed, well nourished patient in no acute distress.  They are alert and oriented and cooperative to examination.     Bilateral knee exam:  Bilateral knee exam is similar to where she was on her last visit approximately 6 months ago. Bilateral knee range of motion is approximately 0-125 degrees. Bilateral knees are stable to varus and valgus stresses while held in extension.  There is crepitus with range of motion and patient is neurovascularly intact into both lower extremities.  She has no effusion bilaterally. Skin is clean dry and intact bilaterally.   there is no erythema or ecchymosis to bilateral knees.  Bilateral and knees have no signs or symptoms of infection.  She is able weightbear as tolerated bilaterally and has a nonantalgic gait.    Left hip exam:  Her left hip exam is similar where she was visit approximately 6 months ago. Skin to her left hip is clean dry and intact.  There is no erythema or  ecchymosis.  There are no signs or symptoms of infection.  Patient has decreased range of motion with internal/external rotation of left hip.  She has increased pain with attempts to doing so.  She has a negative straight leg raise on the left.  Left calf is soft and nontender.  Patient can weight bear as tolerated on her bilateral lower extremities.    Pertinent New Results:        XRAY Report / Interpretation:   No new radiographs were taken on today's clinic visit.      Assessment:       1. Primary osteoarthritis of left hip    2. Primary osteoarthritis of left knee    3. Primary osteoarthritis of right knee      Plan:     Primary osteoarthritis of left hip  -     FL Aspiration and/or Injection Major Joint with Fluoro, Left (XPD); Future; Expected date: 11/28/2022    Primary osteoarthritis of left knee  -     Large Joint Aspiration/Injection: L knee    Primary osteoarthritis of right knee  -     Large Joint Aspiration/Injection: R knee      Follow up in about 3 months (around 2/28/2023) for 3 mth f/up Bilat knee inj 11/28/22 & IA inj Left Hip.    I injected her bilateral knees today via an anterior lateral approach with 40 mg Kenalog lidocaine respectively.  She tolerated this well.  We also placed an order for Interventional Radiology to perform an intra-articular injection to her left hip.  Will have her follow-up with us in 3 months to see how she is responding to these injections.  We did discuss the definitive treatment recommendation due to the severity osteoarthritis in her left hip would be a total hip arthroplasty.  Patient reports she would like to try this injection first to see how she responds.  This is completely acceptable.  Will assess her response and then proceed with further recommendations from there.        Boyd White, MPAS, PA-C    This note was created using [employer] voice recognition software that occasionally misinterprets words or phrases.

## 2022-11-28 NOTE — PROCEDURES
Large Joint Aspiration/Injection: R knee    Date/Time: 11/28/2022 1:45 PM  Performed by: Boyd White PA-C  Authorized by: Boyd White PA-C     Consent Done?:  Yes (Verbal)  Indications:  Pain and arthritis  Site marked: the procedure site was marked    Timeout: prior to procedure the correct patient, procedure, and site was verified    Prep: patient was prepped and draped in usual sterile fashion      Local anesthesia used?: Yes    Local anesthetic:  Lidocaine 1% without epinephrine    Details:  Needle Size:  25 G  Ultrasonic Guidance for needle placement?: No    Location:  Knee  Site:  R knee  Medications:  40 mg triamcinolone acetonide 40 mg/mL  Patient tolerance:  Patient tolerated the procedure well with no immediate complications

## 2022-11-29 ENCOUNTER — TELEPHONE (OUTPATIENT)
Dept: RADIOLOGY | Facility: HOSPITAL | Age: 72
End: 2022-11-29

## 2022-11-29 NOTE — NURSING
Pre procedure instructions for left hip injection given to pt,verbalized understanding to arrive at 1230 on 1/10/22 no dietary or medication restrictions.

## 2023-01-06 ENCOUNTER — PES CALL (OUTPATIENT)
Dept: ADMINISTRATIVE | Facility: CLINIC | Age: 73
End: 2023-01-06
Payer: MEDICARE

## 2023-01-10 ENCOUNTER — HOSPITAL ENCOUNTER (OUTPATIENT)
Dept: RADIOLOGY | Facility: HOSPITAL | Age: 73
Discharge: HOME | End: 2023-01-10
Attending: PHYSICIAN ASSISTANT
Payer: MEDICARE

## 2023-01-10 DIAGNOSIS — M16.12 PRIMARY OSTEOARTHRITIS OF LEFT HIP: ICD-10-CM

## 2023-01-10 PROCEDURE — 25000003 PHARM REV CODE 250: Performed by: PHYSICIAN ASSISTANT

## 2023-01-10 PROCEDURE — 20610 DRAIN/INJ JOINT/BURSA W/O US: CPT | Mod: LT

## 2023-01-10 PROCEDURE — 25500020 PHARM REV CODE 255: Performed by: PHYSICIAN ASSISTANT

## 2023-01-10 PROCEDURE — A4215 STERILE NEEDLE: HCPCS

## 2023-01-10 RX ORDER — METHYLPREDNISOLONE ACETATE 40 MG/ML
40 INJECTION, SUSPENSION INTRA-ARTICULAR; INTRALESIONAL; INTRAMUSCULAR; SOFT TISSUE ONCE
Status: DISCONTINUED | OUTPATIENT
Start: 2023-01-10 | End: 2023-01-11 | Stop reason: HOSPADM

## 2023-01-10 RX ORDER — LIDOCAINE HYDROCHLORIDE 10 MG/ML
10 INJECTION INFILTRATION; PERINEURAL ONCE
Status: COMPLETED | OUTPATIENT
Start: 2023-01-10 | End: 2023-01-10

## 2023-01-10 RX ORDER — BUPIVACAINE HYDROCHLORIDE 2.5 MG/ML
8 INJECTION, SOLUTION EPIDURAL; INFILTRATION; INTRACAUDAL ONCE
Status: COMPLETED | OUTPATIENT
Start: 2023-01-10 | End: 2023-01-10

## 2023-01-10 RX ADMIN — BUPIVACAINE HYDROCHLORIDE 8 ML: 2.5 INJECTION, SOLUTION EPIDURAL; INFILTRATION; INTRACAUDAL; PERINEURAL at 01:01

## 2023-01-10 RX ADMIN — IOHEXOL 2 ML: 300 INJECTION, SOLUTION INTRAVENOUS at 01:01

## 2023-01-10 RX ADMIN — LIDOCAINE HYDROCHLORIDE 7 ML: 10 INJECTION, SOLUTION INFILTRATION; PERINEURAL at 01:01

## 2023-05-01 ENCOUNTER — PATIENT MESSAGE (OUTPATIENT)
Dept: ADMINISTRATIVE | Facility: HOSPITAL | Age: 73
End: 2023-05-01
Payer: MEDICARE

## 2023-05-02 ENCOUNTER — OFFICE VISIT (OUTPATIENT)
Dept: ORTHOPEDICS | Facility: CLINIC | Age: 73
End: 2023-05-02
Payer: MEDICARE

## 2023-05-02 ENCOUNTER — PES CALL (OUTPATIENT)
Dept: ADMINISTRATIVE | Facility: CLINIC | Age: 73
End: 2023-05-02
Payer: MEDICARE

## 2023-05-02 VITALS
HEIGHT: 66 IN | BODY MASS INDEX: 22.18 KG/M2 | WEIGHT: 138 LBS | DIASTOLIC BLOOD PRESSURE: 76 MMHG | SYSTOLIC BLOOD PRESSURE: 126 MMHG

## 2023-05-02 DIAGNOSIS — M17.11 PRIMARY OSTEOARTHRITIS OF RIGHT KNEE: ICD-10-CM

## 2023-05-02 DIAGNOSIS — M16.12 PRIMARY OSTEOARTHRITIS OF LEFT HIP: Primary | ICD-10-CM

## 2023-05-02 DIAGNOSIS — M17.12 PRIMARY OSTEOARTHRITIS OF LEFT KNEE: ICD-10-CM

## 2023-05-02 PROCEDURE — 3074F SYST BP LT 130 MM HG: CPT | Mod: CPTII,S$GLB,, | Performed by: ORTHOPAEDIC SURGERY

## 2023-05-02 PROCEDURE — 3288F PR FALLS RISK ASSESSMENT DOCUMENTED: ICD-10-PCS | Mod: CPTII,S$GLB,, | Performed by: ORTHOPAEDIC SURGERY

## 2023-05-02 PROCEDURE — 3078F DIAST BP <80 MM HG: CPT | Mod: CPTII,S$GLB,, | Performed by: ORTHOPAEDIC SURGERY

## 2023-05-02 PROCEDURE — 20610 LARGE JOINT ASPIRATION/INJECTION: R KNEE: ICD-10-PCS | Mod: 50,S$GLB,, | Performed by: ORTHOPAEDIC SURGERY

## 2023-05-02 PROCEDURE — 3074F PR MOST RECENT SYSTOLIC BLOOD PRESSURE < 130 MM HG: ICD-10-PCS | Mod: CPTII,S$GLB,, | Performed by: ORTHOPAEDIC SURGERY

## 2023-05-02 PROCEDURE — 99213 OFFICE O/P EST LOW 20 MIN: CPT | Mod: 25,S$GLB,, | Performed by: ORTHOPAEDIC SURGERY

## 2023-05-02 PROCEDURE — 1160F RVW MEDS BY RX/DR IN RCRD: CPT | Mod: CPTII,S$GLB,, | Performed by: ORTHOPAEDIC SURGERY

## 2023-05-02 PROCEDURE — 1159F PR MEDICATION LIST DOCUMENTED IN MEDICAL RECORD: ICD-10-PCS | Mod: CPTII,S$GLB,, | Performed by: ORTHOPAEDIC SURGERY

## 2023-05-02 PROCEDURE — 3078F PR MOST RECENT DIASTOLIC BLOOD PRESSURE < 80 MM HG: ICD-10-PCS | Mod: CPTII,S$GLB,, | Performed by: ORTHOPAEDIC SURGERY

## 2023-05-02 PROCEDURE — 1159F MED LIST DOCD IN RCRD: CPT | Mod: CPTII,S$GLB,, | Performed by: ORTHOPAEDIC SURGERY

## 2023-05-02 PROCEDURE — 1101F PT FALLS ASSESS-DOCD LE1/YR: CPT | Mod: CPTII,S$GLB,, | Performed by: ORTHOPAEDIC SURGERY

## 2023-05-02 PROCEDURE — 3288F FALL RISK ASSESSMENT DOCD: CPT | Mod: CPTII,S$GLB,, | Performed by: ORTHOPAEDIC SURGERY

## 2023-05-02 PROCEDURE — 1160F PR REVIEW ALL MEDS BY PRESCRIBER/CLIN PHARMACIST DOCUMENTED: ICD-10-PCS | Mod: CPTII,S$GLB,, | Performed by: ORTHOPAEDIC SURGERY

## 2023-05-02 PROCEDURE — 99213 PR OFFICE/OUTPT VISIT, EST, LEVL III, 20-29 MIN: ICD-10-PCS | Mod: 25,S$GLB,, | Performed by: ORTHOPAEDIC SURGERY

## 2023-05-02 PROCEDURE — 20610 DRAIN/INJ JOINT/BURSA W/O US: CPT | Mod: 50,S$GLB,, | Performed by: ORTHOPAEDIC SURGERY

## 2023-05-02 PROCEDURE — 1125F PR PAIN SEVERITY QUANTIFIED, PAIN PRESENT: ICD-10-PCS | Mod: CPTII,S$GLB,, | Performed by: ORTHOPAEDIC SURGERY

## 2023-05-02 PROCEDURE — 1101F PR PT FALLS ASSESS DOC 0-1 FALLS W/OUT INJ PAST YR: ICD-10-PCS | Mod: CPTII,S$GLB,, | Performed by: ORTHOPAEDIC SURGERY

## 2023-05-02 PROCEDURE — 1125F AMNT PAIN NOTED PAIN PRSNT: CPT | Mod: CPTII,S$GLB,, | Performed by: ORTHOPAEDIC SURGERY

## 2023-05-02 RX ORDER — TRIAMCINOLONE ACETONIDE 40 MG/ML
40 INJECTION, SUSPENSION INTRA-ARTICULAR; INTRAMUSCULAR
Status: DISCONTINUED | OUTPATIENT
Start: 2023-05-02 | End: 2023-05-02 | Stop reason: HOSPADM

## 2023-05-02 RX ADMIN — TRIAMCINOLONE ACETONIDE 40 MG: 40 INJECTION, SUSPENSION INTRA-ARTICULAR; INTRAMUSCULAR at 11:05

## 2023-05-02 NOTE — PROGRESS NOTES
[facility]    Subjective:     Chief Complaint:   Chief Complaint   Patient presents with    Left Knee - Pain     Here for her bilateral knees, last injection 11/2022. She would like another inj    Right Knee - Pain     Here for her bilateral knees, last injection 11/2022. She would like another inj       Past Medical History:   Diagnosis Date    Hypercholesterolemia     Thyroid disease        Past Surgical History:   Procedure Laterality Date    APPENDECTOMY      breast augmentation      CHEILECTOMY      HYSTERECTOMY         Current Outpatient Medications   Medication Sig    aspirin (ECOTRIN) 81 MG EC tablet Take 81 mg by mouth once daily.    atorvastatin (LIPITOR) 20 MG tablet TAKE 1 TABLET BY MOUTH EVERY DAY    co-enzyme Q-10 30 mg capsule Take 30 mg by mouth 3 (three) times daily.    levothyroxine (SYNTHROID) 88 MCG tablet Take 1 tablet (88 mcg total) by mouth once daily.     No current facility-administered medications for this visit.       Review of patient's allergies indicates:  No Known Allergies    Family History   Problem Relation Age of Onset    Breast cancer Mother 82    No Known Problems Daughter     Melanoma Neg Hx        Social History     Socioeconomic History    Marital status:    Tobacco Use    Smoking status: Never    Smokeless tobacco: Never   Substance and Sexual Activity    Alcohol use: Yes       History of present illness:  73-year-old female, follow-up for her bilateral knee arthritis in her left hip osteoarthritis.  She was last seen and injected in her bilateral knees approximately 6 months ago with good relief of her symptoms.  Unfortunately, the pain has returned here recently.  She denies any new injury or trauma.  She is requesting repeat injections in her knees.    Left hip responded well to intra-articular injection.      Review of Systems:    Constitution: Negative for chills, fever, and sweats.  Negative for unexplained weight loss.    HENT:  Negative for  headaches and blurry vision.    Cardiovascular:Negative for chest pain or irregular heart beat. Negative for hypertension.    Respiratory:  Negative for cough and shortness of breath.    Gastrointestinal: Negative for abdominal pain, heartburn, melena, nausea, and vomitting.    Genitourinary:  Negative bladder incontinence and dysuria.    Musculoskeletal:  See HPI for details.     Neurological: Negative for numbness.    Psychiatric/Behavioral: Negative for depression.  The patient is not nervous/anxious.      Endocrine: Negative for polyuria    Hematologic/Lymphatic: Negative for bleeding problem.  Does not bruise/bleed easily.    Skin: Negative for poor would healing and rash    Objective:      Physical Examination:    Vital Signs:    Vitals:    05/02/23 1146   BP: 126/76       Body mass index is 22.27 kg/m².    This a well-developed, well nourished patient in no acute distress.  They are alert and oriented and cooperative to examination.        Bilateral knee exam is similar to where she was on her last visit approximately 6 months ago. Bilateral knee range of motion is approximately 0-125 degrees. Bilateral knees are stable to varus and valgus stresses while held in extension.  There is crepitus with range of motion and patient is neurovascularly intact into both lower extremities.  She has no effusion bilaterally. Skin is clean dry and intact bilaterally.   there is no erythema or ecchymosis to bilateral knees.  Bilateral and knees have no signs or symptoms of infection.  She is able weightbear as tolerated bilaterally and has a nonantalgic gait.    Pertinent New Results:    XRAY Report / Interpretation:       Assessment/Plan:      Osteoarthritis bilateral knees, osteoarthritis left hip.  Left hip doing well after intra-articular steroid injection, follow-up p.r.n..  She is complaining of some pain in her right hip, x-rays do not show nearly the advanced nature of the arthritis in the left hip.  Continue to  "monitor follow up p.r.n..  In terms of the bilateral knees, we injected both knees today, lidocaine and triamcinolone anterior lateral approach sterile technique patient tolerated well.  Shots have been doing her well for anywhere from 3-6 months.  She will simply follow up p.r.n..    Ladarius Daniel, Physician Assistant, served in the capacity as a "scribe" for this patient encounter.  A "face-to-face" encounter occurred with Dr. Boom Gunter on this date.  The treatment plan and medical decision-making is outlined above. Patient was seen and examined with a chaperone.       This note was created using Dragon voice recognition software that occasionally misinterpreted phrases or words.          "

## 2023-05-02 NOTE — PROCEDURES
Large Joint Aspiration/Injection: R knee    Date/Time: 5/2/2023 11:45 AM  Performed by: Boom Gunter MD  Authorized by: Boom Gunter MD     Consent Done?:  Yes (Verbal)  Indications:  Arthritis and pain  Site marked: the procedure site was marked    Timeout: prior to procedure the correct patient, procedure, and site was verified    Prep: patient was prepped and draped in usual sterile fashion      Local anesthesia used?: Yes    Local anesthetic:  Lidocaine 1% without epinephrine    Details:  Needle Size:  25 G  Ultrasonic Guidance for needle placement?: No    Location:  Knee  Site:  R knee  Medications:  40 mg triamcinolone acetonide 40 mg/mL  Patient tolerance:  Patient tolerated the procedure well with no immediate complications  Large Joint Aspiration/Injection: L knee    Date/Time: 5/2/2023 11:45 AM  Performed by: Boom Gunter MD  Authorized by: Boom Gunter MD     Consent Done?:  Yes (Verbal)  Indications:  Arthritis and pain  Site marked: the procedure site was marked    Timeout: prior to procedure the correct patient, procedure, and site was verified    Prep: patient was prepped and draped in usual sterile fashion      Local anesthesia used?: Yes    Local anesthetic:  Lidocaine 1% without epinephrine    Details:  Needle Size:  25 G  Ultrasonic Guidance for needle placement?: No    Location:  Knee  Site:  L knee  Medications:  40 mg triamcinolone acetonide 40 mg/mL  Patient tolerance:  Patient tolerated the procedure well with no immediate complications

## 2023-06-15 ENCOUNTER — PES CALL (OUTPATIENT)
Dept: ADMINISTRATIVE | Facility: CLINIC | Age: 73
End: 2023-06-15
Payer: MEDICARE

## 2023-07-05 ENCOUNTER — OFFICE VISIT (OUTPATIENT)
Dept: ORTHOPEDICS | Facility: CLINIC | Age: 73
End: 2023-07-05
Payer: MEDICARE

## 2023-07-05 VITALS — BODY MASS INDEX: 22.5 KG/M2 | WEIGHT: 140 LBS | HEIGHT: 66 IN

## 2023-07-05 DIAGNOSIS — M16.12 PRIMARY OSTEOARTHRITIS OF LEFT HIP: Primary | ICD-10-CM

## 2023-07-05 PROCEDURE — 1101F PT FALLS ASSESS-DOCD LE1/YR: CPT | Mod: CPTII,S$GLB,, | Performed by: PHYSICIAN ASSISTANT

## 2023-07-05 PROCEDURE — 1101F PR PT FALLS ASSESS DOC 0-1 FALLS W/OUT INJ PAST YR: ICD-10-PCS | Mod: CPTII,S$GLB,, | Performed by: PHYSICIAN ASSISTANT

## 2023-07-05 PROCEDURE — 99213 PR OFFICE/OUTPT VISIT, EST, LEVL III, 20-29 MIN: ICD-10-PCS | Mod: S$GLB,,, | Performed by: PHYSICIAN ASSISTANT

## 2023-07-05 PROCEDURE — 1160F PR REVIEW ALL MEDS BY PRESCRIBER/CLIN PHARMACIST DOCUMENTED: ICD-10-PCS | Mod: CPTII,S$GLB,, | Performed by: PHYSICIAN ASSISTANT

## 2023-07-05 PROCEDURE — 1126F PR PAIN SEVERITY QUANTIFIED, NO PAIN PRESENT: ICD-10-PCS | Mod: CPTII,S$GLB,, | Performed by: PHYSICIAN ASSISTANT

## 2023-07-05 PROCEDURE — 1159F PR MEDICATION LIST DOCUMENTED IN MEDICAL RECORD: ICD-10-PCS | Mod: CPTII,S$GLB,, | Performed by: PHYSICIAN ASSISTANT

## 2023-07-05 PROCEDURE — 99213 OFFICE O/P EST LOW 20 MIN: CPT | Mod: S$GLB,,, | Performed by: PHYSICIAN ASSISTANT

## 2023-07-05 PROCEDURE — 1160F RVW MEDS BY RX/DR IN RCRD: CPT | Mod: CPTII,S$GLB,, | Performed by: PHYSICIAN ASSISTANT

## 2023-07-05 PROCEDURE — 3288F PR FALLS RISK ASSESSMENT DOCUMENTED: ICD-10-PCS | Mod: CPTII,S$GLB,, | Performed by: PHYSICIAN ASSISTANT

## 2023-07-05 PROCEDURE — 1126F AMNT PAIN NOTED NONE PRSNT: CPT | Mod: CPTII,S$GLB,, | Performed by: PHYSICIAN ASSISTANT

## 2023-07-05 PROCEDURE — 1159F MED LIST DOCD IN RCRD: CPT | Mod: CPTII,S$GLB,, | Performed by: PHYSICIAN ASSISTANT

## 2023-07-05 PROCEDURE — 3288F FALL RISK ASSESSMENT DOCD: CPT | Mod: CPTII,S$GLB,, | Performed by: PHYSICIAN ASSISTANT

## 2023-07-05 NOTE — PROGRESS NOTES
[facility]  [address]  [place] [address]  Phone: [phone]   [phone]    Patient's PCP: Rubens Tinajero III, MD  Referring Provider: No ref. provider found    Subjective:      Chief Complaint:   Chief Complaint   Patient presents with    Left Hip - Pain     Left hip pain, pain is in groin, here for XR to get IA injection       Past Medical History:   Diagnosis Date    Hypercholesterolemia     Thyroid disease        Past Surgical History:   Procedure Laterality Date    APPENDECTOMY      breast augmentation      CHEILECTOMY      HYSTERECTOMY         Current Outpatient Medications   Medication Sig    aspirin (ECOTRIN) 81 MG EC tablet Take 81 mg by mouth once daily.    atorvastatin (LIPITOR) 20 MG tablet TAKE 1 TABLET BY MOUTH EVERY DAY    co-enzyme Q-10 30 mg capsule Take 30 mg by mouth 3 (three) times daily.    levothyroxine (SYNTHROID) 88 MCG tablet Take 1 tablet (88 mcg total) by mouth once daily.     No current facility-administered medications for this visit.       Review of patient's allergies indicates:  No Known Allergies    Family History   Problem Relation Age of Onset    Breast cancer Mother 82    No Known Problems Daughter     Melanoma Neg Hx        Social History     Socioeconomic History    Marital status:    Tobacco Use    Smoking status: Never    Smokeless tobacco: Never   Substance and Sexual Activity    Alcohol use: Yes       History of present illness:  Olivia comes in today for follow-up for her left hip osteoarthritis.  She had a left hip intra-articular injection about 6 months ago with great relief of her symptoms.  This lasted for about months.  She denies any new injury or trauma.  She is requesting a repeat injection order for the left hip.  I believe this is appropriate considering her response last time.    Review of Systems:    Constitutional: Negative for chills, fever and weight loss.   HENT: Negative for congestion.    Eyes: Negative for  discharge and redness.   Respiratory: Negative for cough and shortness of breath.    Cardiovascular: Negative for chest pain.   Gastrointestinal: Negative for nausea and vomiting.   Musculoskeletal: See HPI.   Skin: Negative for rash.   Neurological: Negative for headaches.   Endo/Heme/Allergies: Does not bruise/bleed easily.   Psychiatric/Behavioral: The patient is not nervous/anxious.    All other systems reviewed and are negative.       Objective:      Physical Examination:    Vital Signs:  There were no vitals filed for this visit.    Body mass index is 22.6 kg/m².    This a well-developed, well nourished patient in no acute distress.  They are alert and oriented and cooperative to examination.     Left hip exam:  Her left hip exam is similar where she was visit approximately 8 months ago. Skin to her left hip is clean dry and intact.  There is no erythema or ecchymosis.  There are no signs or symptoms of infection.  Patient has decreased range of motion with internal/external rotation of left hip.  She has increased pain with attempts to doing so.  She has a negative straight leg raise on the left.  Left calf is soft and nontender.  Patient can weight bear as tolerated on her bilateral lower extremities.    Pertinent New Results:        XRAY Report / Interpretation:   Two views were taken of her left hip today: AP and lateral views.  They reveal no acute fractures or dislocations.  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  She does have sclerosis of the superior acetabulum and the superior femoral head.      Assessment:       1. Primary osteoarthritis of left hip      Plan:     Primary osteoarthritis of left hip  -     X-Ray Hip 1 View Left (with Pelvis when performed)  -     FL Aspiration and/or Injection Major Joint with Fluoro, Left (XPD); Future; Expected date: 07/05/2023        Follow up in about 2 months  (around 9/5/2023).    Ordered an intra-articular corticosteroid injection with Interventional Radiology.  She would like to proceed with total hip arthroplasty and about 5 months.  We will see her back in the office in about 2 months to review her response to this injection order today with the intent of scheduling her for total hip arthroplasty at her leisure.        Boyd White, JESSICAS, PA-C    This note was created using [employer] voice recognition software that occasionally misinterprets words or phrases.

## 2023-07-07 ENCOUNTER — TELEPHONE (OUTPATIENT)
Dept: RADIOLOGY | Facility: HOSPITAL | Age: 73
End: 2023-07-07

## 2023-07-10 ENCOUNTER — TELEPHONE (OUTPATIENT)
Dept: RADIOLOGY | Facility: HOSPITAL | Age: 73
End: 2023-07-10

## 2023-07-10 ENCOUNTER — TELEPHONE (OUTPATIENT)
Dept: FAMILY MEDICINE | Facility: CLINIC | Age: 73
End: 2023-07-10
Payer: MEDICARE

## 2023-07-10 DIAGNOSIS — Z79.82 ASPIRIN LONG-TERM USE: ICD-10-CM

## 2023-07-10 DIAGNOSIS — E78.5 HYPERLIPIDEMIA, UNSPECIFIED HYPERLIPIDEMIA TYPE: Primary | ICD-10-CM

## 2023-07-10 DIAGNOSIS — E03.9 HYPOTHYROIDISM, UNSPECIFIED TYPE: ICD-10-CM

## 2023-07-10 RX ORDER — MULTIVITAMIN
1 TABLET ORAL DAILY
COMMUNITY

## 2023-07-10 NOTE — NURSING
Pre procedure instructions for left hip injections given to pt ,verbalized understanding to arrive at 1030 on 7/31/23, no dietary restrictions .

## 2023-07-10 NOTE — TELEPHONE ENCOUNTER
Patient called her appointment is on 8/11/2023  Fasting labs pended      ----- Message from Jaida Olson sent at 7/10/2023  2:31 PM CDT -----  Regarding: lab orders needed  Contact: Patient  Type: Needs Medical Advice  Who Called:  Patient  Symptoms (please be specific):  metabolic panel lab needed  How long has patient had these symptoms:    Pharmacy name and phone #:    Best Call Back Number: [phone]    Additional Information: Medications is running out and needs the following to be done; please call to advise once put in. Thanks!

## 2023-07-15 LAB
ALBUMIN SERPL-MCNC: 4.1 G/DL (ref 3.6–5.1)
ALBUMIN/GLOB SERPL: 1.7 (CALC) (ref 1–2.5)
ALP SERPL-CCNC: 51 U/L (ref 37–153)
ALT SERPL-CCNC: 18 U/L (ref 6–29)
AST SERPL-CCNC: 24 U/L (ref 10–35)
BASOPHILS # BLD AUTO: 69 CELLS/UL (ref 0–200)
BASOPHILS NFR BLD AUTO: 1 %
BILIRUB SERPL-MCNC: 0.4 MG/DL (ref 0.2–1.2)
BUN SERPL-MCNC: 18 MG/DL (ref 7–25)
BUN/CREAT SERPL: NORMAL (CALC) (ref 6–22)
CALCIUM SERPL-MCNC: 9.5 MG/DL (ref 8.6–10.4)
CHLORIDE SERPL-SCNC: 105 MMOL/L (ref 98–110)
CHOLEST SERPL-MCNC: 184 MG/DL
CHOLEST/HDLC SERPL: 2.4 (CALC)
CO2 SERPL-SCNC: 30 MMOL/L (ref 20–32)
CREAT SERPL-MCNC: 0.8 MG/DL (ref 0.6–1)
EGFR: 77 ML/MIN/1.73M2
EOSINOPHIL # BLD AUTO: 173 CELLS/UL (ref 15–500)
EOSINOPHIL NFR BLD AUTO: 2.5 %
ERYTHROCYTE [DISTWIDTH] IN BLOOD BY AUTOMATED COUNT: 13.1 % (ref 11–15)
GLOBULIN SER CALC-MCNC: 2.4 G/DL (CALC) (ref 1.9–3.7)
GLUCOSE SERPL-MCNC: 93 MG/DL (ref 65–99)
HCT VFR BLD AUTO: 39.3 % (ref 35–45)
HDLC SERPL-MCNC: 78 MG/DL
HGB BLD-MCNC: 12.5 G/DL (ref 11.7–15.5)
LDLC SERPL CALC-MCNC: 91 MG/DL (CALC)
LYMPHOCYTES # BLD AUTO: 1815 CELLS/UL (ref 850–3900)
LYMPHOCYTES NFR BLD AUTO: 26.3 %
MCH RBC QN AUTO: 27.8 PG (ref 27–33)
MCHC RBC AUTO-ENTMCNC: 31.8 G/DL (ref 32–36)
MCV RBC AUTO: 87.5 FL (ref 80–100)
MONOCYTES # BLD AUTO: 490 CELLS/UL (ref 200–950)
MONOCYTES NFR BLD AUTO: 7.1 %
NEUTROPHILS # BLD AUTO: 4354 CELLS/UL (ref 1500–7800)
NEUTROPHILS NFR BLD AUTO: 63.1 %
NONHDLC SERPL-MCNC: 106 MG/DL (CALC)
PLATELET # BLD AUTO: 241 THOUSAND/UL (ref 140–400)
PMV BLD REES-ECKER: 12.5 FL (ref 7.5–12.5)
POTASSIUM SERPL-SCNC: 4.2 MMOL/L (ref 3.5–5.3)
PROT SERPL-MCNC: 6.5 G/DL (ref 6.1–8.1)
RBC # BLD AUTO: 4.49 MILLION/UL (ref 3.8–5.1)
SODIUM SERPL-SCNC: 142 MMOL/L (ref 135–146)
TRIGL SERPL-MCNC: 68 MG/DL
TSH SERPL-ACNC: 1.27 MIU/L (ref 0.4–4.5)
WBC # BLD AUTO: 6.9 THOUSAND/UL (ref 3.8–10.8)

## 2023-07-31 ENCOUNTER — HOSPITAL ENCOUNTER (OUTPATIENT)
Dept: RADIOLOGY | Facility: HOSPITAL | Age: 73
Discharge: HOME | End: 2023-07-31
Attending: PHYSICIAN ASSISTANT
Payer: MEDICARE

## 2023-07-31 DIAGNOSIS — M16.12 PRIMARY OSTEOARTHRITIS OF LEFT HIP: ICD-10-CM

## 2023-07-31 PROCEDURE — 25000003 PHARM REV CODE 250: Performed by: PHYSICIAN ASSISTANT

## 2023-07-31 PROCEDURE — 25500020 PHARM REV CODE 255: Performed by: PHYSICIAN ASSISTANT

## 2023-07-31 PROCEDURE — 20610 DRAIN/INJ JOINT/BURSA W/O US: CPT | Mod: LT

## 2023-07-31 PROCEDURE — 63600175 PHARM REV CODE 636 W HCPCS: Performed by: PHYSICIAN ASSISTANT

## 2023-07-31 PROCEDURE — A4215 STERILE NEEDLE: HCPCS

## 2023-07-31 RX ORDER — METHYLPREDNISOLONE ACETATE 40 MG/ML
40 INJECTION, SUSPENSION INTRA-ARTICULAR; INTRALESIONAL; INTRAMUSCULAR; SOFT TISSUE ONCE
Status: COMPLETED | OUTPATIENT
Start: 2023-07-31 | End: 2023-07-31

## 2023-07-31 RX ORDER — BUPIVACAINE HYDROCHLORIDE 2.5 MG/ML
8 INJECTION, SOLUTION EPIDURAL; INFILTRATION; INTRACAUDAL ONCE
Status: COMPLETED | OUTPATIENT
Start: 2023-07-31 | End: 2023-07-31

## 2023-07-31 RX ORDER — LIDOCAINE HYDROCHLORIDE 10 MG/ML
7 INJECTION INFILTRATION; PERINEURAL ONCE
Status: COMPLETED | OUTPATIENT
Start: 2023-07-31 | End: 2023-07-31

## 2023-07-31 RX ADMIN — METHYLPREDNISOLONE ACETATE 40 MG: 40 INJECTION, SUSPENSION INTRA-ARTICULAR; INTRALESIONAL; INTRAMUSCULAR; SOFT TISSUE at 10:07

## 2023-07-31 RX ADMIN — IOHEXOL 3 ML: 300 INJECTION, SOLUTION INTRAVENOUS at 10:07

## 2023-07-31 RX ADMIN — BUPIVACAINE HYDROCHLORIDE 20 MG: 2.5 INJECTION, SOLUTION EPIDURAL; INFILTRATION; INTRACAUDAL; PERINEURAL at 10:07

## 2023-07-31 RX ADMIN — LIDOCAINE HYDROCHLORIDE 7 ML: 10 INJECTION, SOLUTION INFILTRATION; PERINEURAL at 10:07

## 2023-08-11 ENCOUNTER — OFFICE VISIT (OUTPATIENT)
Dept: FAMILY MEDICINE | Facility: CLINIC | Age: 73
End: 2023-08-11
Payer: MEDICARE

## 2023-08-11 VITALS
HEART RATE: 78 BPM | WEIGHT: 141.69 LBS | OXYGEN SATURATION: 96 % | HEIGHT: 66 IN | TEMPERATURE: 97 F | DIASTOLIC BLOOD PRESSURE: 70 MMHG | SYSTOLIC BLOOD PRESSURE: 124 MMHG | BODY MASS INDEX: 22.77 KG/M2

## 2023-08-11 DIAGNOSIS — M17.11 OSTEOARTHRITIS OF RIGHT KNEE, UNSPECIFIED OSTEOARTHRITIS TYPE: ICD-10-CM

## 2023-08-11 DIAGNOSIS — Z12.31 BREAST CANCER SCREENING BY MAMMOGRAM: ICD-10-CM

## 2023-08-11 DIAGNOSIS — Z12.11 COLON CANCER SCREENING: ICD-10-CM

## 2023-08-11 DIAGNOSIS — D69.2 OTHER NONTHROMBOCYTOPENIC PURPURA: ICD-10-CM

## 2023-08-11 DIAGNOSIS — M17.12 OSTEOARTHRITIS OF LEFT KNEE, UNSPECIFIED OSTEOARTHRITIS TYPE: ICD-10-CM

## 2023-08-11 DIAGNOSIS — E03.9 HYPOTHYROIDISM, UNSPECIFIED TYPE: Primary | ICD-10-CM

## 2023-08-11 PROCEDURE — 3078F PR MOST RECENT DIASTOLIC BLOOD PRESSURE < 80 MM HG: ICD-10-PCS | Mod: CPTII,S$GLB,, | Performed by: FAMILY MEDICINE

## 2023-08-11 PROCEDURE — 3288F PR FALLS RISK ASSESSMENT DOCUMENTED: ICD-10-PCS | Mod: CPTII,S$GLB,, | Performed by: FAMILY MEDICINE

## 2023-08-11 PROCEDURE — 1125F AMNT PAIN NOTED PAIN PRSNT: CPT | Mod: CPTII,S$GLB,, | Performed by: FAMILY MEDICINE

## 2023-08-11 PROCEDURE — 3074F PR MOST RECENT SYSTOLIC BLOOD PRESSURE < 130 MM HG: ICD-10-PCS | Mod: CPTII,S$GLB,, | Performed by: FAMILY MEDICINE

## 2023-08-11 PROCEDURE — 1159F MED LIST DOCD IN RCRD: CPT | Mod: CPTII,S$GLB,, | Performed by: FAMILY MEDICINE

## 2023-08-11 PROCEDURE — 99214 OFFICE O/P EST MOD 30 MIN: CPT | Mod: S$GLB,,, | Performed by: FAMILY MEDICINE

## 2023-08-11 PROCEDURE — 1159F PR MEDICATION LIST DOCUMENTED IN MEDICAL RECORD: ICD-10-PCS | Mod: CPTII,S$GLB,, | Performed by: FAMILY MEDICINE

## 2023-08-11 PROCEDURE — 1125F PR PAIN SEVERITY QUANTIFIED, PAIN PRESENT: ICD-10-PCS | Mod: CPTII,S$GLB,, | Performed by: FAMILY MEDICINE

## 2023-08-11 PROCEDURE — 1160F PR REVIEW ALL MEDS BY PRESCRIBER/CLIN PHARMACIST DOCUMENTED: ICD-10-PCS | Mod: CPTII,S$GLB,, | Performed by: FAMILY MEDICINE

## 2023-08-11 PROCEDURE — 1101F PR PT FALLS ASSESS DOC 0-1 FALLS W/OUT INJ PAST YR: ICD-10-PCS | Mod: CPTII,S$GLB,, | Performed by: FAMILY MEDICINE

## 2023-08-11 PROCEDURE — 1101F PT FALLS ASSESS-DOCD LE1/YR: CPT | Mod: CPTII,S$GLB,, | Performed by: FAMILY MEDICINE

## 2023-08-11 PROCEDURE — 99214 PR OFFICE/OUTPT VISIT, EST, LEVL IV, 30-39 MIN: ICD-10-PCS | Mod: S$GLB,,, | Performed by: FAMILY MEDICINE

## 2023-08-11 PROCEDURE — 3078F DIAST BP <80 MM HG: CPT | Mod: CPTII,S$GLB,, | Performed by: FAMILY MEDICINE

## 2023-08-11 PROCEDURE — 1160F RVW MEDS BY RX/DR IN RCRD: CPT | Mod: CPTII,S$GLB,, | Performed by: FAMILY MEDICINE

## 2023-08-11 PROCEDURE — 3074F SYST BP LT 130 MM HG: CPT | Mod: CPTII,S$GLB,, | Performed by: FAMILY MEDICINE

## 2023-08-11 PROCEDURE — 3288F FALL RISK ASSESSMENT DOCD: CPT | Mod: CPTII,S$GLB,, | Performed by: FAMILY MEDICINE

## 2023-08-11 RX ORDER — ATORVASTATIN CALCIUM 20 MG/1
20 TABLET, FILM COATED ORAL DAILY
Qty: 90 TABLET | Refills: 3 | Status: SHIPPED | OUTPATIENT
Start: 2023-08-11

## 2023-08-11 RX ORDER — LEVOTHYROXINE SODIUM 100 UG/1
TABLET ORAL
Qty: 90 TABLET | Refills: 3 | Status: SHIPPED | OUTPATIENT
Start: 2023-08-11

## 2023-08-11 RX ORDER — ATORVASTATIN CALCIUM 20 MG/1
20 TABLET, FILM COATED ORAL DAILY
Qty: 90 TABLET | Refills: 3 | Status: SHIPPED | OUTPATIENT
Start: 2023-08-11 | End: 2023-08-11 | Stop reason: SDUPTHER

## 2023-08-11 RX ORDER — LEVOTHYROXINE SODIUM 88 UG/1
88 TABLET ORAL DAILY
Qty: 90 TABLET | Refills: 2 | Status: CANCELLED | OUTPATIENT
Start: 2023-08-11

## 2023-08-11 NOTE — PATIENT INSTRUCTIONS
Follow up in 1 month with TSH lab done     Cologuadevyn will be mailed    Mammo - [facility] will call to schedule     Levothyroxine 100 mcg - one tab a day 6 days a week, 1/2 tab one day a week

## 2023-08-15 ENCOUNTER — TELEPHONE (OUTPATIENT)
Dept: ORTHOPEDICS | Facility: CLINIC | Age: 73
End: 2023-08-15

## 2023-08-15 NOTE — TELEPHONE ENCOUNTER
----- Message from Mariela Alford sent at 8/15/2023  2:08 PM CDT -----  Pt called  and  stated  she  wants  to  get  on Dr. Adames Surgery  schedule  for  December. Pt would  like  a call  back.  Patient wants to schedule VELMA in December   Appt scheduled 9.26.23

## 2023-08-16 NOTE — PROGRESS NOTES
Subjective:       Patient ID: Olivia Mix is a 73 y.o. female.    Chief Complaint: Follow-up    BMI 22.9.  Up 2 lb.  Exercising.  Degenerative joint disease left hip.  Replacing this fall.  Two shots in it so far.  Also degenerative disease of the right knee.  Colon cancer screening is due.  Breast cancer screening due.  Has hypothyroidism.  On 88 daily.  TSH 1.27 previously 1.38 and 1.38.  Cholesterol 184 HDL 78 LDL 91 previously 101.  CMP is normal CBC is normal.  Has some bruising on her forearms.      Physical examination.  Vital signs noted.  Some senile purpuric on the forearms.  Neck without bruit.  Chest clear.  Heart regular rate and rhythm.  Abdomen bowel sounds are positive soft nontender.  Extremities without edema positive pulses.        Objective:        Assessment:       1. Hypothyroidism, unspecified type    2. Breast cancer screening by mammogram    3. Colon cancer screening    4. Osteoarthritis of right knee, unspecified osteoarthritis type    5. Osteoarthritis of left knee, unspecified osteoarthritis type    6. Other nonthrombocytopenic purpura        Plan:       Hypothyroidism, unspecified type  -     TSH; Future; Expected date: 10/30/2023    Breast cancer screening by mammogram  -     Mammo Digital Screening Bilat; Future; Expected date: 08/11/2023    Colon cancer screening  -     Cologuard Screening (Multitarget Stool DNA); Future; Expected date: 08/11/2023    Osteoarthritis of right knee, unspecified osteoarthritis type    Osteoarthritis of left knee, unspecified osteoarthritis type    Other nonthrombocytopenic purpura    Other orders  -     Discontinue: atorvastatin (LIPITOR) 20 MG tablet; Take 1 tablet (20 mg total) by mouth once daily.  Dispense: 90 tablet; Refill: 3  -     levothyroxine (SYNTHROID) 100 MCG tablet; Take one tab 6 days a week, take 1/2 tab one day a week  Dispense: 90 tablet; Refill: 3  -     atorvastatin (LIPITOR) 20 MG tablet; Take 1 tablet (20 mg total) by mouth once  daily.  Dispense: 90 tablet; Refill: 3    She would like to increase her levothyroxine if possible.  The most we can go up is to 106 days a week and then 1/2 of a 100 mcg pill on Sundays.  For total of 650 per week.  Lipitor 20 mg daily 90 with 3 refills.  Cologuard for colon cancer screening nose limitations.  Mammogram ordered.  Declines shingles vaccine.  Follow-up in 3 months with a TSH.

## 2023-08-21 ENCOUNTER — HOSPITAL ENCOUNTER (OUTPATIENT)
Dept: RADIOLOGY | Facility: HOSPITAL | Age: 73
Discharge: HOME | End: 2023-08-21
Attending: FAMILY MEDICINE
Payer: MEDICARE

## 2023-08-21 VITALS — BODY MASS INDEX: 22.78 KG/M2 | HEIGHT: 66 IN | WEIGHT: 141.75 LBS

## 2023-08-21 DIAGNOSIS — Z12.31 BREAST CANCER SCREENING BY MAMMOGRAM: ICD-10-CM

## 2023-08-21 PROCEDURE — 77067 SCR MAMMO BI INCL CAD: CPT | Mod: TC,PO

## 2023-09-01 ENCOUNTER — TELEPHONE (OUTPATIENT)
Dept: FAMILY MEDICINE | Facility: CLINIC | Age: 73
End: 2023-09-01
Payer: MEDICARE

## 2023-09-01 ENCOUNTER — TELEPHONE (OUTPATIENT)
Dept: GASTROENTEROLOGY | Facility: CLINIC | Age: 73
End: 2023-09-01
Payer: MEDICARE

## 2023-09-01 DIAGNOSIS — R19.5 POSITIVE COLORECTAL CANCER SCREENING USING COLOGUARD TEST: Primary | ICD-10-CM

## 2023-09-01 LAB — NONINV COLON CA DNA+OCC BLD SCRN STL QL: POSITIVE

## 2023-09-01 NOTE — TELEPHONE ENCOUNTER
Called patient  to see about scheduling from referral/ patient unaware of test results and would like to wait and talk to Dr Tinajero. No further issues noted.

## 2023-09-01 NOTE — TELEPHONE ENCOUNTER
----- Message from Kenny Sanders sent at 9/1/2023 11:17 AM CDT -----  Contact: self  Type:  Patient Returning Call    Who Called:  Patient   Who Left Message for Patient:  Barb  Does the patient know what this is regarding?:  n/a  Best Call Back Number:  [phone]    Additional Information:  Pt is ret a call back.Please call  back

## 2023-09-01 NOTE — TELEPHONE ENCOUNTER
Left message on machine   Her cologard is positive   referred to gastro per Timmy Ball NP----- Message from Atiya Tatum sent at 9/1/2023 11:31 AM CDT -----  Contact: Patient  Type:  Test Results    Who Called:   Patient  Name of Test (Lab/Mammo/Etc):   ColoGuard    Ordering Provider:   Dr Tinajero  Where the test was performed:   Home    Would the patient rather a call back or a response via [facility]?  Call back  Best Call Back Number:   [phone]    Additional Information:  States she needs to speak with someone in the office regarding her ColoGuard results - states she also has questions as to why she is being referred from Dr Tinajero to Gastroenterology and did not hear from Dr Tinajero first - please call to discuss - thank you

## 2023-09-05 ENCOUNTER — TELEPHONE (OUTPATIENT)
Dept: GASTROENTEROLOGY | Facility: CLINIC | Age: 73
End: 2023-09-05
Payer: MEDICARE

## 2023-09-19 NOTE — PROGRESS NOTES
Subjective:       Patient ID: Olivia Mix is a 73 y.o. female Body mass index is 22.67 kg/m².    Chief Complaint: positive fit test    This patient is new to me.  Referring Provider: Dr. Rubens Tinajero III for positive cologuard.  Established patient of Dr. Macdonald GI.     Patient seen for colorectal cancer screening, high risk due to personal history of Positive cologuard and colon polyps, age appropriate,  last colonoscopy was about 10 years ago: see surgical history, with no associated signs/symptoms (see ROS), and no alleviating/exacerbating factors. Denies family history of colon cancer/polyps.               GI Problem  Primary symptoms do not include fever, weight loss, fatigue, abdominal pain, nausea, vomiting, diarrhea, melena or hematochezia.   The illness does not include dysphagia, bloating or constipation (The patient has 6 BMs per week rates stool type 4 through 5 on [place] stool scale, takes Metamucil and mineral oil prn, eats a daily high-fiber diet with lots of vegetables and fruit). Associated symptoms comments: Patient presents with a positive Cologuard completed on 08/11/2023,  Patient unsure if she wants to continue with screening colonoscopies.  -patient states last colonoscopy was about 10 years ago with  Lilo patient does not have a copy of last colonoscopy.  Will attempt to get a copy.  -patient concern with oily stools patient brought in a pancreatic elastase stool tests completed in 2021 with Dr. Macdonald that showed a normal pancreatic elastase at the time she was having oily stools, pt states she takes mineral oil to help with BMs and eats avocado daily, Pt states has a very clean diet with fruits and vegetables and low-fat meat states eats the same thing mostly, last episode was about 2 weeks ago and it only lasts for a couple days and it goes away, lipid panel within normal limits pt on statin.  -patient reports green mucus occasionally would stools as well, patient states it  comes and goes  -patient states her stools are brown denies hematochezia or melena. Associated medical issues do not include inflammatory bowel disease, GERD, gallstones, liver disease, alcohol abuse, PUD, gastric bypass, bowel resection, irritable bowel syndrome, hemorrhoids (hx hemorrhoids) or diverticulitis.       Review of Systems   Constitutional:  Negative for activity change, fatigue, fever, unexpected weight change and weight loss.   Gastrointestinal:  Negative for abdominal distention, abdominal pain, anal bleeding, bloating, blood in stool, constipation (The patient has 6 BMs per week rates stool type 4 through 5 on [place] stool scale, takes Metamucil and mineral oil prn, eats a daily high-fiber diet with lots of vegetables and fruit), diarrhea, dysphagia, hematochezia, melena, nausea, rectal pain and vomiting.         No LMP recorded. Patient has had a hysterectomy.  Past Medical History:   Diagnosis Date    Colon polyp     Hypercholesterolemia     Thyroid disease      Past Surgical History:   Procedure Laterality Date    AUGMENTATION OF BREAST      breast augmentation      CHEILECTOMY      COLONOSCOPY      10 years ago    HYSTERECTOMY       Family History   Problem Relation Age of Onset    Breast cancer Mother 82    No Known Problems Daughter     Melanoma Neg Hx     Colon cancer Neg Hx     Colon polyps Neg Hx     Cystic fibrosis Neg Hx     Esophageal cancer Neg Hx     Liver cancer Neg Hx     Rectal cancer Neg Hx     Stomach cancer Neg Hx     Ulcerative colitis Neg Hx      Social History     Tobacco Use    Smoking status: Never    Smokeless tobacco: Never   Substance Use Topics    Alcohol use: Yes     Wt Readings from Last 10 Encounters:   09/20/23 63.7 kg (140 lb 6.9 oz)   08/21/23 64.3 kg (141 lb 12.1 oz)   08/11/23 64.3 kg (141 lb 11.2 oz)   07/05/23 63.5 kg (140 lb)   05/02/23 62.6 kg (138 lb)   11/28/22 62.6 kg (138 lb)   08/11/22 63.9 kg (140 lb 14.4 oz)   06/06/22 63.5 kg (140 lb)   02/17/22 61.7  "kg (136 lb)   02/14/22 63.9 kg (140 lb 14 oz)     Lab Results   Component Value Date    WBC 6.9 07/14/2023    HGB 12.5 07/14/2023    HCT 39.3 07/14/2023    MCV 87.5 07/14/2023     07/14/2023     CMP  Sodium   Date Value Ref Range Status   07/14/2023 142 135 - 146 mmol/L Final   12/26/2018 141 134 - 144 mmol/L      Potassium   Date Value Ref Range Status   07/14/2023 4.2 3.5 - 5.3 mmol/L Final     Chloride   Date Value Ref Range Status   07/14/2023 105 98 - 110 mmol/L Final   12/26/2018 105 98 - 110 mmol/L      CO2   Date Value Ref Range Status   07/14/2023 30 20 - 32 mmol/L Final     Glucose   Date Value Ref Range Status   07/14/2023 93 65 - 99 mg/dL Final     Comment:                   Fasting reference interval        12/26/2018 97 70 - 99 mg/dL      BUN   Date Value Ref Range Status   07/14/2023 18 7 - 25 mg/dL Final     Creatinine   Date Value Ref Range Status   07/14/2023 0.80 0.60 - 1.00 mg/dL Final   12/26/2018 0.65 0.60 - 1.40 mg/dL      Calcium   Date Value Ref Range Status   07/14/2023 9.5 8.6 - 10.4 mg/dL Final     Total Protein   Date Value Ref Range Status   07/14/2023 6.5 6.1 - 8.1 g/dL Final     Albumin   Date Value Ref Range Status   07/14/2023 4.1 3.6 - 5.1 g/dL Final   12/26/2018 3.6 3.1 - 4.7 g/dL      Total Bilirubin   Date Value Ref Range Status   07/14/2023 0.4 0.2 - 1.2 mg/dL Final     Alkaline Phosphatase   Date Value Ref Range Status   12/19/2019 49 33 - 130 U/L Final     AST   Date Value Ref Range Status   07/14/2023 24 10 - 35 U/L Final     ALT   Date Value Ref Range Status   07/14/2023 18 6 - 29 U/L Final     eGFR if    Date Value Ref Range Status   10/20/2021 93 > OR = 60 mL/min/1.73m2 Final     eGFR if non    Date Value Ref Range Status   10/20/2021 80 > OR = 60 mL/min/1.73m2 Final     No results found for: "LIPASE"  No results found for: "LIPASERES"  Lab Results   Component Value Date    TSH 1.27 07/14/2023       Reviewed prior medical records " including radiology report of & all endoscopy history reviewed(see surgical history/procedures).    Objective:      Physical Exam  Vitals and nursing note reviewed.   Constitutional:       Appearance: Normal appearance. She is normal weight.   Cardiovascular:      Rate and Rhythm: Normal rate and regular rhythm.      Heart sounds: Normal heart sounds.   Pulmonary:      Breath sounds: Normal breath sounds.   Abdominal:      General: Bowel sounds are normal.      Palpations: Abdomen is soft.      Tenderness: There is no abdominal tenderness.   Skin:     General: Skin is warm and dry.      Coloration: Skin is not jaundiced.   Neurological:      Mental Status: She is alert and oriented to person, place, and time.   Psychiatric:         Mood and Affect: Mood normal.         Behavior: Behavior normal.         Assessment:       1. Positive colorectal cancer screening using Cologuard test    2. Fatty stools    3. History of colon polyps        Plan:       Positive colorectal cancer screening using Cologuard test, History of colon polyps  -  discussed about Colonoscopy, including the risks and benefits of colonoscopy, patient verbalized understanding but patient declined colonoscopy.  - recommended scheduling Colonoscopy, discussed procedure with patient, discussed the importance and advantages of colonoscopy versus other screening methods such as fecal occult testing (colonoscopy is the best screening method to detect colorectal cancer, if small polyps present can remove them before they become problematic or cancerous, and discussed that symptoms are usually not present until colorectal cancer is advance), patient verbalized understanding but refused screening colonoscopy  - discussed with patient that fecal occult testing is less sensitive to detecting colorectal cancer and is only based on the samples provided, if no blood found in samples does not guarantee that bleeding is not present or that there is no colorectal  polyps or cancer present, patient verbalized understanding and still declines colonoscopy and wants to do the fecal occult testing  - informed patient that if occult testing positive, colonoscopy is recommended  - Patient will call to schedule if decides she would like to schedule colonoscopy     Mucus in stool   -  recommended colonoscopy, patient verbalized understanding but patient declined colonoscopy.    Fatty stools  -     US Abdomen Complete; Future; Expected date: 09/20/2023  -     Pancreatic elastase, fecal; Future; Expected date: 09/20/2023  -     Fecal fat, quantitative; Future; Expected date: 09/20/2023               Follow up in about 4 weeks (around 10/18/2023), or if symptoms worsen or fail to improve.      If no improvement in symptoms or symptoms worsen, call/follow-up at clinic or go to ER.       [facility] - GASTROENTEROLOGY  [facility], [place]     Dictation software program was used for this note. Please expect some simple typographical  errors in this note.    Encounter includes face to face time and non-face to face time preparing to see the patient (eg, review of tests), obtaining and/or reviewing separately obtained history, documenting clinical information in the electronic or other health record, independently interpreting results (not separately reported) and communicating results to the patient/family/caregiver, or care coordination (not separately reported).

## 2023-09-20 ENCOUNTER — OFFICE VISIT (OUTPATIENT)
Dept: GASTROENTEROLOGY | Facility: CLINIC | Age: 73
End: 2023-09-20
Payer: MEDICARE

## 2023-09-20 VITALS
HEART RATE: 74 BPM | HEIGHT: 66 IN | SYSTOLIC BLOOD PRESSURE: 131 MMHG | DIASTOLIC BLOOD PRESSURE: 63 MMHG | BODY MASS INDEX: 22.57 KG/M2 | WEIGHT: 140.44 LBS

## 2023-09-20 DIAGNOSIS — R19.5 POSITIVE COLORECTAL CANCER SCREENING USING COLOGUARD TEST: Primary | ICD-10-CM

## 2023-09-20 DIAGNOSIS — K90.9 FATTY STOOLS: ICD-10-CM

## 2023-09-20 DIAGNOSIS — R19.5 MUCUS IN STOOL: ICD-10-CM

## 2023-09-20 DIAGNOSIS — Z86.010 HISTORY OF COLON POLYPS: ICD-10-CM

## 2023-09-20 PROCEDURE — 1101F PR PT FALLS ASSESS DOC 0-1 FALLS W/OUT INJ PAST YR: ICD-10-PCS | Mod: CPTII,S$GLB,,

## 2023-09-20 PROCEDURE — 1126F PR PAIN SEVERITY QUANTIFIED, NO PAIN PRESENT: ICD-10-PCS | Mod: CPTII,S$GLB,,

## 2023-09-20 PROCEDURE — 3288F FALL RISK ASSESSMENT DOCD: CPT | Mod: CPTII,S$GLB,,

## 2023-09-20 PROCEDURE — 3288F PR FALLS RISK ASSESSMENT DOCUMENTED: ICD-10-PCS | Mod: CPTII,S$GLB,,

## 2023-09-20 PROCEDURE — 3078F DIAST BP <80 MM HG: CPT | Mod: CPTII,S$GLB,,

## 2023-09-20 PROCEDURE — 99203 PR OFFICE/OUTPT VISIT, NEW, LEVL III, 30-44 MIN: ICD-10-PCS | Mod: S$GLB,,,

## 2023-09-20 PROCEDURE — 1126F AMNT PAIN NOTED NONE PRSNT: CPT | Mod: CPTII,S$GLB,,

## 2023-09-20 PROCEDURE — 3075F SYST BP GE 130 - 139MM HG: CPT | Mod: CPTII,S$GLB,,

## 2023-09-20 PROCEDURE — 1159F PR MEDICATION LIST DOCUMENTED IN MEDICAL RECORD: ICD-10-PCS | Mod: CPTII,S$GLB,,

## 2023-09-20 PROCEDURE — 99203 OFFICE O/P NEW LOW 30 MIN: CPT | Mod: S$GLB,,,

## 2023-09-20 PROCEDURE — 1160F RVW MEDS BY RX/DR IN RCRD: CPT | Mod: CPTII,S$GLB,,

## 2023-09-20 PROCEDURE — 99999 PR PBB SHADOW E&M-EST. PATIENT-LVL III: ICD-10-PCS | Mod: PBBFAC,,,

## 2023-09-20 PROCEDURE — 1101F PT FALLS ASSESS-DOCD LE1/YR: CPT | Mod: CPTII,S$GLB,,

## 2023-09-20 PROCEDURE — 3078F PR MOST RECENT DIASTOLIC BLOOD PRESSURE < 80 MM HG: ICD-10-PCS | Mod: CPTII,S$GLB,,

## 2023-09-20 PROCEDURE — 1159F MED LIST DOCD IN RCRD: CPT | Mod: CPTII,S$GLB,,

## 2023-09-20 PROCEDURE — 3075F PR MOST RECENT SYSTOLIC BLOOD PRESS GE 130-139MM HG: ICD-10-PCS | Mod: CPTII,S$GLB,,

## 2023-09-20 PROCEDURE — 1160F PR REVIEW ALL MEDS BY PRESCRIBER/CLIN PHARMACIST DOCUMENTED: ICD-10-PCS | Mod: CPTII,S$GLB,,

## 2023-09-20 PROCEDURE — 99999 PR PBB SHADOW E&M-EST. PATIENT-LVL III: CPT | Mod: PBBFAC,,,

## 2023-09-25 ENCOUNTER — LAB VISIT (OUTPATIENT)
Dept: LAB | Facility: HOSPITAL | Age: 73
End: 2023-09-25
Payer: MEDICARE

## 2023-09-25 DIAGNOSIS — K90.9 FATTY STOOLS: ICD-10-CM

## 2023-09-25 PROCEDURE — 82653 EL-1 FECAL QUANTITATIVE: CPT

## 2023-09-25 PROCEDURE — 82710 FATS/LIPIDS FECES QUANT: CPT

## 2023-09-26 ENCOUNTER — OFFICE VISIT (OUTPATIENT)
Dept: ORTHOPEDICS | Facility: CLINIC | Age: 73
End: 2023-09-26
Payer: MEDICARE

## 2023-09-26 ENCOUNTER — PATIENT MESSAGE (OUTPATIENT)
Dept: ORTHOPEDICS | Facility: CLINIC | Age: 73
End: 2023-09-26
Payer: MEDICARE

## 2023-09-26 VITALS — HEIGHT: 66 IN | BODY MASS INDEX: 22.5 KG/M2 | WEIGHT: 140 LBS

## 2023-09-26 DIAGNOSIS — Z01.818 PRE-OP TESTING: ICD-10-CM

## 2023-09-26 DIAGNOSIS — M16.12 PRIMARY OSTEOARTHRITIS OF LEFT HIP: Primary | ICD-10-CM

## 2023-09-26 PROCEDURE — 99213 PR OFFICE/OUTPT VISIT, EST, LEVL III, 20-29 MIN: ICD-10-PCS | Mod: S$GLB,,, | Performed by: ORTHOPAEDIC SURGERY

## 2023-09-26 PROCEDURE — 3288F PR FALLS RISK ASSESSMENT DOCUMENTED: ICD-10-PCS | Mod: CPTII,S$GLB,, | Performed by: ORTHOPAEDIC SURGERY

## 2023-09-26 PROCEDURE — 1160F PR REVIEW ALL MEDS BY PRESCRIBER/CLIN PHARMACIST DOCUMENTED: ICD-10-PCS | Mod: CPTII,S$GLB,, | Performed by: ORTHOPAEDIC SURGERY

## 2023-09-26 PROCEDURE — 1159F PR MEDICATION LIST DOCUMENTED IN MEDICAL RECORD: ICD-10-PCS | Mod: CPTII,S$GLB,, | Performed by: ORTHOPAEDIC SURGERY

## 2023-09-26 PROCEDURE — 1159F MED LIST DOCD IN RCRD: CPT | Mod: CPTII,S$GLB,, | Performed by: ORTHOPAEDIC SURGERY

## 2023-09-26 PROCEDURE — 1160F RVW MEDS BY RX/DR IN RCRD: CPT | Mod: CPTII,S$GLB,, | Performed by: ORTHOPAEDIC SURGERY

## 2023-09-26 PROCEDURE — 99213 OFFICE O/P EST LOW 20 MIN: CPT | Mod: S$GLB,,, | Performed by: ORTHOPAEDIC SURGERY

## 2023-09-26 PROCEDURE — 1125F PR PAIN SEVERITY QUANTIFIED, PAIN PRESENT: ICD-10-PCS | Mod: CPTII,S$GLB,, | Performed by: ORTHOPAEDIC SURGERY

## 2023-09-26 PROCEDURE — 1101F PR PT FALLS ASSESS DOC 0-1 FALLS W/OUT INJ PAST YR: ICD-10-PCS | Mod: CPTII,S$GLB,, | Performed by: ORTHOPAEDIC SURGERY

## 2023-09-26 PROCEDURE — 3288F FALL RISK ASSESSMENT DOCD: CPT | Mod: CPTII,S$GLB,, | Performed by: ORTHOPAEDIC SURGERY

## 2023-09-26 PROCEDURE — 1125F AMNT PAIN NOTED PAIN PRSNT: CPT | Mod: CPTII,S$GLB,, | Performed by: ORTHOPAEDIC SURGERY

## 2023-09-26 PROCEDURE — 1101F PT FALLS ASSESS-DOCD LE1/YR: CPT | Mod: CPTII,S$GLB,, | Performed by: ORTHOPAEDIC SURGERY

## 2023-09-26 NOTE — PROGRESS NOTES
[facility]    Subjective:     Chief Complaint:   Chief Complaint   Patient presents with    Left Hip - Pain     Patient is here with complaints of Left Hip pain, wants to discuss VELMA, states her pain is the same as her last visit.        Past Medical History:   Diagnosis Date    Colon polyp     Hypercholesterolemia     Thyroid disease        Past Surgical History:   Procedure Laterality Date    AUGMENTATION OF BREAST      breast augmentation      CHEILECTOMY      COLONOSCOPY      10 years ago    HYSTERECTOMY         Current Outpatient Medications   Medication Sig    aspirin (ECOTRIN) 81 MG EC tablet Take 81 mg by mouth 3 (three) times a week.    atorvastatin (LIPITOR) 20 MG tablet Take 1 tablet (20 mg total) by mouth once daily.    co-enzyme Q-10 30 mg capsule Take 30 mg by mouth 3 (three) times a week.    levothyroxine (SYNTHROID) 100 MCG tablet Take one tab 6 days a week, take 1/2 tab one day a week    multivitamin (ONE DAILY MULTIVITAMIN) per tablet Take 1 tablet by mouth once daily.     No current facility-administered medications for this visit.       Review of patient's allergies indicates:  No Known Allergies    Family History   Problem Relation Age of Onset    Breast cancer Mother 82    No Known Problems Daughter     Melanoma Neg Hx     Colon cancer Neg Hx     Colon polyps Neg Hx     Cystic fibrosis Neg Hx     Esophageal cancer Neg Hx     Liver cancer Neg Hx     Rectal cancer Neg Hx     Stomach cancer Neg Hx     Ulcerative colitis Neg Hx        Social History     Socioeconomic History    Marital status:    Tobacco Use    Smoking status: Never    Smokeless tobacco: Never   Substance and Sexual Activity    Alcohol use: Yes     Social Determinants of Health     Stress: No Stress Concern Present (6/25/2020)    [employer] - Occupational Stress Questionnaire     Feeling of Stress : Not at all       History of present illness:  Patient comes in today for the left  hip.  She is miserable.  She wants to have her hip replaced.  She is living with this for years.  She is had injections.  She is been on nonsteroidals.      Review of Systems:    Constitution: Negative for chills, fever, and sweats.  Negative for unexplained weight loss.    HENT:  Negative for headaches and blurry vision.    Cardiovascular:Negative for chest pain or irregular heart beat. Negative for hypertension.    Respiratory:  Negative for cough and shortness of breath.    Gastrointestinal: Negative for abdominal pain, heartburn, melena, nausea, and vomitting.    Genitourinary:  Negative bladder incontinence and dysuria.    Musculoskeletal:  See HPI for details.     Neurological: Negative for numbness.    Psychiatric/Behavioral: Negative for depression.  The patient is not nervous/anxious.      Endocrine: Negative for polyuria    Hematologic/Lymphatic: Negative for bleeding problem.  Does not bruise/bleed easily.    Skin: Negative for poor would healing and rash    Objective:      Physical Examination:    Vital Signs:  There were no vitals filed for this visit.    Body mass index is 22.6 kg/m².    This a well-developed, well nourished patient in no acute distress.  They are alert and oriented and cooperative to examination.        Patient appears to be stated age.  She has severe discomfort with internal-external rotation of the hip.  She has flexion to 90°.  She is a 5 degree flexion contracture.  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  The risks and benefits discussed with her in great detail including infection DVT dislocation leg length discrepancy and other complications.  She understood and wished to proceed      This note was created using Dragon voice recognition software that occasionally misinterpreted phrases or words.      [facility]    Subjective:     Chief Complaint:   Chief Complaint   Patient presents with    Left Hip - Pain     Patient is here with complaints of Left Hip pain, wants to discuss VELMA, states her pain is the same as her last visit.        Past Medical History:   Diagnosis Date    Colon polyp     Hypercholesterolemia     Thyroid disease        Past Surgical History:   Procedure Laterality Date    AUGMENTATION OF BREAST      breast augmentation      CHEILECTOMY      COLONOSCOPY      10 years ago    HYSTERECTOMY         Current Outpatient Medications   Medication Sig    aspirin (ECOTRIN) 81 MG EC tablet Take 81 mg by mouth 3 (three) times a week.    atorvastatin (LIPITOR) 20 MG tablet Take 1 tablet (20 mg total) by mouth once daily.    co-enzyme Q-10 30 mg capsule Take 30 mg by mouth 3 (three) times a week.    levothyroxine (SYNTHROID) 100 MCG tablet Take one tab 6 days a week, take 1/2 tab one day a week    multivitamin (ONE DAILY MULTIVITAMIN) per tablet Take 1 tablet by mouth once daily.     No current facility-administered medications for this visit.       Review of patient's allergies indicates:  No Known Allergies    Family History   Problem Relation Age of Onset    Breast cancer Mother 82    No Known Problems Daughter     Melanoma Neg Hx     Colon cancer Neg Hx     Colon polyps Neg Hx     Cystic fibrosis Neg Hx     Esophageal cancer Neg Hx     Liver cancer Neg Hx     Rectal cancer Neg Hx     Stomach cancer Neg Hx     Ulcerative colitis Neg Hx        Social History     Socioeconomic History    Marital status:    Tobacco Use    Smoking status: Never    Smokeless tobacco: Never   Substance and Sexual Activity    Alcohol use: Yes     Social Determinants of Health     Stress: No Stress Concern Present (6/25/2020)    [employer] - Occupational Stress Questionnaire     Feeling of Stress : Not at all

## 2023-09-28 LAB
COLLECT DURATION TIME STL: 24 H
ELASTASE 1, FECAL: 131 MCG/G
FAT 24H STL-MRATE: <1 G/24 H (ref 2–7)
PERCENT FAT: ABNORMAL
SPECIMEN WT STL QN: 3 G

## 2023-09-29 ENCOUNTER — TELEPHONE (OUTPATIENT)
Dept: GASTROENTEROLOGY | Facility: CLINIC | Age: 73
End: 2023-09-29
Payer: MEDICARE

## 2023-09-29 DIAGNOSIS — K86.89 PANCREATIC INSUFFICIENCY: Primary | ICD-10-CM

## 2023-09-29 NOTE — TELEPHONE ENCOUNTER
Pt was Called today to discuss pancreatic elastase and fecal fat stool test results. Stool studies showed decreased fecal elastase which is consistent with pancreatic insufficiency that can be contributing to the GI symptoms. Treatment is a prescription, Creon that is taking 4 times a day with meals and a snack. Pt was educated on pancreatic insufficiency and the Creon medication, patient verbalized understanding and had no further questions. Patient declined to start on Creon at the moment.  I Recommended abdominal CT scan to further evaluate this finding. Also, I encouraged a colonoscopy again due to these results for further evaluation. Patient agreed to abdominal CT scan patient would like to continue to hold off on colonoscopy at the moment.     -HYACINTH Singer

## 2023-10-13 ENCOUNTER — HOSPITAL ENCOUNTER (OUTPATIENT)
Dept: RADIOLOGY | Facility: HOSPITAL | Age: 73
Discharge: HOME | End: 2023-10-13
Payer: MEDICARE

## 2023-10-13 DIAGNOSIS — K86.89 PANCREATIC INSUFFICIENCY: ICD-10-CM

## 2023-10-13 PROCEDURE — 25500020 PHARM REV CODE 255

## 2023-10-13 PROCEDURE — 74177 CT ABDOMEN PELVIS WITH CONTRAST: ICD-10-PCS | Mod: 26,,, | Performed by: RADIOLOGY

## 2023-10-13 PROCEDURE — 74177 CT ABD & PELVIS W/CONTRAST: CPT | Mod: TC

## 2023-10-13 PROCEDURE — 74177 CT ABD & PELVIS W/CONTRAST: CPT | Mod: 26,,, | Performed by: RADIOLOGY

## 2023-10-13 RX ADMIN — IOHEXOL 75 ML: 350 INJECTION, SOLUTION INTRAVENOUS at 08:10

## 2023-11-20 ENCOUNTER — HOSPITAL ENCOUNTER (OUTPATIENT)
Dept: PREADMISSION TESTING | Facility: HOSPITAL | Age: 73
Discharge: HOME | End: 2023-11-20
Attending: ORTHOPAEDIC SURGERY
Payer: MEDICARE

## 2023-11-20 ENCOUNTER — TELEPHONE (OUTPATIENT)
Dept: ORTHOPEDICS | Facility: CLINIC | Age: 73
End: 2023-11-20

## 2023-11-20 VITALS — BODY MASS INDEX: 22.18 KG/M2 | HEIGHT: 66 IN | WEIGHT: 138 LBS

## 2023-11-20 DIAGNOSIS — Z01.818 PRE-OP TESTING: ICD-10-CM

## 2023-11-20 DIAGNOSIS — M16.12 PRIMARY OSTEOARTHRITIS OF LEFT HIP: ICD-10-CM

## 2023-11-20 DIAGNOSIS — M16.12 PRIMARY OSTEOARTHRITIS OF LEFT HIP: Primary | ICD-10-CM

## 2023-11-20 DIAGNOSIS — Z01.818 PREOP TESTING: Primary | ICD-10-CM

## 2023-11-20 PROCEDURE — 93005 ELECTROCARDIOGRAM TRACING: CPT

## 2023-11-20 PROCEDURE — 93010 EKG 12-LEAD: ICD-10-PCS | Mod: ,,, | Performed by: INTERNAL MEDICINE

## 2023-11-20 PROCEDURE — 93010 ELECTROCARDIOGRAM REPORT: CPT | Mod: ,,, | Performed by: INTERNAL MEDICINE

## 2023-11-20 NOTE — PRE ADMISSION SCREENING
JOINT CAMP ASSESSMENT    Name Olivia Mix   MRN [id number]    Age/Sex 73 y.o. female    Surgeon Dr. Boom Gunter   Joint Camp Date 11/20/2023   Surgery Date 12/04/2023   Procedure Left Hip Arthroplasty   Insurance Payor: [employer]ARE / Plan: [employer] 65 / Product Type: Medicare Advantage /    Care Team Patient Care Team:  Rubens Tinajero III, MD as PCP - General (Family Medicine)  Rubens Tinajero III, MD as Consulting Physician (Family Medicine)  Boom Gunter MD as Consulting Physician (Orthopedic Surgery)    Pharmacy   [id number] - [place] - [address] [facility] [address]  [address] [address]  Phone: [phone] Fax: [phone]     AM-PAC Score   24   Risk Assessment Score 0     Past Medical History:   Diagnosis Date    Colon polyp     Hypercholesterolemia     Thyroid disease        Past Surgical History:   Procedure Laterality Date    AUGMENTATION OF BREAST      breast augmentation      CHEILECTOMY      COLONOSCOPY      10 years ago    HYSTERECTOMY           Home Enviroment     Living Arrangement: Lives alone  Home Environment: 1-story house/ trailer, number of outside stairs: 1, walk-in shower  Home Safety Concerns: unremarkable    DISCHARGE CAREGIVER/SUPPORT SYSTEM     Identified post-op caregiver: Patient has children / family / friends to help, daughter.  Patient's caregiver(s) will be able to provide physical assistance. Patient will have someone to assist overnight.      Caregiver present at pre-op interview:  No    PRE-OPERATIVE FUNCTIONAL STATUS     Employment: Retired    Pre-op Functional Status: Patient is independent with mobility/ambulation, transfers, ADL's, IADL's.    Use of assistive device for ambulation: none  ADL: self care  ADL Limitations: difficulty with walking  Medical Restrictions: Unstable ambulation and Decreased range of motions in extremities    POTENTIAL BARRIERS TO  DISCHARGE/POTENTIAL POST-OP COMPLICATIONS     No major medical hx that would delay discharge. POSSIBLE SAME DAY DISCHARGE.    DISCHARGE PLAN     Expected LOS of 1 days or less for joint replacement discussed with patient. We also discussed a discharge path of HH for approximately two weeks with a transition to outpatient PT on the third week given no post-op complications.      Patient in agreement with discharge plan: Yes    Discharge to: Discharge home with home health (PT/OT) x2 weeks with transition to outpatient PT     HH:  "[facility]. Patient disclosure form completed and sent to case management for upload to the medical record.      OP PT: [facility] ([place] [place])     Home DME: rollator and bedside commode    Needed DME at D/C: rolling walker. Patient refused Hip Kit.     Rx: Per Dr. Gunter at discharge     Meds to Beds: Yes  Patient expected to discharge on Aspirin 81mg by mouth twice daily for DVT prophylaxis.

## 2023-11-20 NOTE — PRE ADMISSION SCREENING
Patient Name: Olivia Mix  YOB: 1950   MRN: [id number]     [facility]   Basic Mobility Inpatient Short Form 6 Clicks         How much difficulty does the patient currently have  Unable  A Lot  A Little  None      1. Turning over in bed (including adjusting bedclothes, sheets and blankets)?     1 []    2 []    3 []    4 [x]        2. Sitting down on and standing up from a chair with arms (e.g., wheelchair, bedside commode, etc.)     1 []  2 []  3 []     4 [x]      3. Moving from lying on back to sitting on the side of the bed?     1 []  2 []  3 []    4 [x]    How much help from another person does the patient currently need  Total  A Lot  A Little  None      4. Moving to and from a bed to a chair (including a wheelchair)?    1 []  2 []  3 []    4 [x]      5. Need to walk in hospital room?    1 []  2 []  3 []    4 [x]      6. Climbing 3-5 steps with a railing?    1 []  2 []  3 []    4 [x]       Raw Score:      24            CMS 0-100% Score:    0        %   Standardized Score:   61.14            CMS Modifier:       [facility] AM-PAC   Basic Mobility Inpatient Short Form 6 Clicks Score Conversion Table*         *Use this form to convert -PAC Basic Mobility Inpatient Raw Scores.   [facility] Inpatient Basic Mobility Short Form Scoring Example   1. Add the number values associated with the response to each item. For example, items totals yield a Raw Score of 21.   2. Match the raw score to the t-Scale scores (t-Scale score = 50.25, SE = 4.69).   3. Find the associated CMS % (CMS % = 28.97%).   4. Locate the correct CMS Functional Modifier Code, or G Code (G code = CJ)     NOTE: Each -PAC Short Form has a separate conversion table. Make sure that you use the correct conversion table.       Instruction Manual - page 45 contains conversion table

## 2023-11-20 NOTE — PRE ADMISSION SCREENING
"               [facility] Risk Assessment Scale    Patient Name: Olivia Mix  YOB: 1950  MRN: [id number]            RIsk Factor Measure Recommendation Patient Data Scale/Score   BMI >40 Reconsider surgery, weight loss   Estimated body mass index is 22.27 kg/m² as calculated from the following:    Height as of this encounter: 5' 6" (1.676 m).    Weight as of this encounter: 62.6 kg (138 lb).   [x] 0 = 1 - 24.9  [] 1 = 25-29.9  [] 2 = 30-34.9  [] 3 = 35-39.9  [] 4 = 40-44.9  [] 5 = 45-99.9   Hemoglobin AIC (if applicable) >9 Delay surgery until DM under control  Refer for:  Nutrition Therapy  Exercise   Medication    No results found for: "LABA1C", "HGBA1C"    Lab Results   Component Value Date    GLU 83 11/20/2023      [] 0 = 4.0-5.6  [] 1 = 5.7-6.4  [] 2 = 6.5-6.9  [] 3 = 7.0-7.9  [] 4 = 8.0-8.9  [] 5 = 9.0-12   Hemoglobin (Anemia) <9 Delay surgery   Correct anemia Lab Results   Component Value Date    HGB 12.1 11/20/2023    [] 20 - <9.0                    Albumin <3 Delay surgery &Workup Lab Results   Component Value Date    ALBUMIN 3.9 11/20/2023    [] 20 - <3.0   Smoking Cessation >4 Weeks Delay Surgery  Refer to OP Cessation Class    Never Smoker [] 20 - current smoker                                _____ PPD                    Hx of MI, PE, Arrhythmia, CVA, DVT <30 Days Delay Surgery    N/A [] 20      Infection Variable Delay surgery and re-evaluate   N/A [] 20 - recent/current infection     Depression (PHQ) >10 out of 27 Delay Surgery and re-evaluate  Medication  Counseling              [x] 0     []1     []2     []3      []4      [] 5                    (1-4)      (5-9)  (10-14)  (15-19)   (20-27)     Memory Impairment & Memory loss (Mini-Cog Screening Tool) Advanced dementia and/or Parkinson's Reconsider surgery     [x] 0     []1     []2     []3     []4     [] 5     Physical Conditioning (Modified AM-PAC Per Physical Therapy at [facility]) Unable to ambulate on day of surgery Delay surgery and " re-evaluate  Pre-Rehabilitation   (PT evaluation)       [x]  0   []4       []8     []12        []16     []20       (<20%)   (<40%)   (<60%)   (<80% )    (>80%)     Home Environment/Caregiver support  (Per /Navigator Interview)    Availability of basic services and/or approprate assistance during post-operative period Delay surgery and re-evaluate  Safe home environment  Health   1 week post-surgery  Transportation  availability  Ability to obtain DME/Medications post-op    [x] 0     []1     []2     []3     []4     [] 5  [x] 0     []1     []2     []3     []4     [] 5  [x] 0     []1     []2     []3     []4     [] 5  [x] 0     []1     []2     []3     []4     [] 5         MD Contact: Dr. Gunter Comments:  Total Score:  0

## 2023-11-20 NOTE — DISCHARGE INSTRUCTIONS
To confirm, Your doctor has instructed you that surgery is scheduled for:     Please report to [facility], Registration the morning of surgery. You must check-in and receive a wristband before going to your procedure.  [address] [place] [address] [place] [id number]    Pre-Op will call the afternoon prior to surgery between 1:00 and 6:00 PM with the final arrival time.  Phone number: [phone]    PLEASE NOTE:  The surgery schedule has many variables which may affect the time of your surgery case.  Family members should be available if your surgery time changes.  Plan to be here the day of your procedure between 4-6 hours.    MEDICATIONS:  TAKE ONLY THESE MEDICATIONS WITH A SMALL SIP OF WATER THE MORNING OF YOUR PROCEDURE: Synthroid.      DO NOT TAKE THESE MEDICATIONS 5-7 DAYS PRIOR to your procedure or per your surgeon's request: Hold Aspirin, CoQ10 on 11/30.     ASPIRIN, ALEVE, ADVIL, IBUPROFEN, FISH OIL VITAMIN E, HERBALS  (May take Tylenol)    ONLY if you are prescribed any types of blood thinners such as:  Aspirin, Coumadin, Plavix, Pradaxa, Xarelto, Aggrenox, Effient, Eliquis, Savasya, Brilinta, or any other, ask your surgeon whether you should stop taking them and how long before surgery you should stop.  You may also need to verify with the prescribing physician if it is ok to stop your medication.      INSTRUCTIONS IMPORTANT!!  Do not eat or drink anything between midnight and the time of your procedure- this includes gum, mints, and candy.  Do not smoke or drink alcoholic beverages 24 hours prior to your procedure.  Shower the night before AND the morning of your procedure with a Chlorhexidine wash such as Hibiclens or Dial antibacterial soap from the neck down.  Do not get it on your face or in your eyes.  You may use your own shampoo and face wash. This helps your skin to be as bacteria free as possible.    If you wear contact lenses, dentures, hearing aids or glasses, bring a container to  put them in during surgery and give to a family member for safe keeping.  Please leave all jewelry, piercing's and valuables at home. You must remove your false eyelashes prior to surgery.    DO NOT remove hair from the surgery site.  Do not shave the incision site unless you are given specific instructions to do so.    ONLY if you have been diagnosed with sleep apnea please bring your C-PAP machine.  ONLY if you wear home oxygen please bring your portable oxygen tank the day of your procedure.  ONLY if you have a history of OPEN HEART SURGERY you will need a clearance from your Cardiologist per Anesthesia.      ONLY for patients requiring bowel prep, written instructions will be given by your doctor's office.  ONLY if you have a neuro stimulator, please bring the controller with you the morning of surgery  ONLY if a type and screen test is needed before surgery, please return:  If your doctor has scheduled you for an overnight stay, bring a small overnight bag with any personal items you need.  Make arrangements in advance for transportation home by a responsible adult.  It is not safe to drive a vehicle during the 24 hours after anesthesia.          All  facilities and properties are tobacco free.  Smoking is NOT allowed.   If you have any questions about these instructions, call Pre-Op Admit  Nursing at [phone] or the Pre-Op Day Surgery Unit at [phone].

## 2023-11-29 DIAGNOSIS — M16.12 PRIMARY OSTEOARTHRITIS OF LEFT HIP: Primary | ICD-10-CM

## 2023-11-29 DIAGNOSIS — M17.12 PRIMARY OSTEOARTHRITIS OF LEFT KNEE: ICD-10-CM

## 2023-11-30 DIAGNOSIS — M16.12 PRIMARY OSTEOARTHRITIS OF LEFT HIP: Primary | ICD-10-CM

## 2023-11-30 RX ORDER — ASPIRIN 81 MG/1
81 TABLET ORAL EVERY 12 HOURS
Qty: 60 TABLET | Refills: 0 | Status: SHIPPED | OUTPATIENT
Start: 2023-11-30 | End: 2024-01-09

## 2023-11-30 RX ORDER — DOCUSATE SODIUM 100 MG/1
100 CAPSULE, LIQUID FILLED ORAL 2 TIMES DAILY
Qty: 60 CAPSULE | Refills: 0 | Status: SHIPPED | OUTPATIENT
Start: 2023-11-30 | End: 2024-01-03

## 2023-11-30 RX ORDER — CELECOXIB 200 MG/1
200 CAPSULE ORAL 2 TIMES DAILY
Qty: 60 CAPSULE | Refills: 0 | Status: SHIPPED | OUTPATIENT
Start: 2023-11-30 | End: 2024-01-03

## 2023-11-30 RX ORDER — GABAPENTIN 300 MG/1
300 CAPSULE ORAL 2 TIMES DAILY
Qty: 60 CAPSULE | Refills: 0 | Status: SHIPPED | OUTPATIENT
Start: 2023-11-30 | End: 2024-01-29

## 2023-11-30 RX ORDER — OXYCODONE AND ACETAMINOPHEN 7.5; 325 MG/1; MG/1
1 TABLET ORAL EVERY 6 HOURS PRN
Qty: 28 TABLET | Refills: 0 | Status: SHIPPED | OUTPATIENT
Start: 2023-11-30

## 2023-12-03 ENCOUNTER — ANESTHESIA EVENT (OUTPATIENT)
Dept: SURGERY | Facility: HOSPITAL | Age: 73
End: 2023-12-03
Payer: MEDICARE

## 2023-12-04 ENCOUNTER — ANESTHESIA (OUTPATIENT)
Dept: SURGERY | Facility: HOSPITAL | Age: 73
End: 2023-12-04
Payer: MEDICARE

## 2023-12-04 ENCOUNTER — HOSPITAL ENCOUNTER (OUTPATIENT)
Facility: HOSPITAL | Age: 73
Discharge: HOME | End: 2023-12-04
Attending: ORTHOPAEDIC SURGERY | Admitting: ORTHOPAEDIC SURGERY
Payer: MEDICARE

## 2023-12-04 DIAGNOSIS — M16.12 PRIMARY OSTEOARTHRITIS OF LEFT HIP: Primary | ICD-10-CM

## 2023-12-04 LAB
ABO + RH BLD: NORMAL
BASOPHILS # BLD AUTO: 0.04 K/UL (ref 0–0.2)
BASOPHILS NFR BLD: 0.3 % (ref 0–1.9)
BLD GP AB SCN CELLS X3 SERPL QL: NORMAL
DIFFERENTIAL METHOD: ABNORMAL
EOSINOPHIL # BLD AUTO: 0 K/UL (ref 0–0.5)
EOSINOPHIL NFR BLD: 0.3 % (ref 0–8)
ERYTHROCYTE [DISTWIDTH] IN BLOOD BY AUTOMATED COUNT: 13.2 % (ref 11.5–14.5)
HCT VFR BLD AUTO: 35.6 % (ref 37–48.5)
HGB BLD-MCNC: 11.1 G/DL (ref 12–16)
IMM GRANULOCYTES # BLD AUTO: 0.07 K/UL (ref 0–0.04)
IMM GRANULOCYTES NFR BLD AUTO: 0.5 % (ref 0–0.5)
LYMPHOCYTES # BLD AUTO: 1.2 K/UL (ref 1–4.8)
LYMPHOCYTES NFR BLD: 8.7 % (ref 18–48)
MCH RBC QN AUTO: 28.2 PG (ref 27–31)
MCHC RBC AUTO-ENTMCNC: 31.2 G/DL (ref 32–36)
MCV RBC AUTO: 91 FL (ref 82–98)
MONOCYTES # BLD AUTO: 0.3 K/UL (ref 0.3–1)
MONOCYTES NFR BLD: 2.2 % (ref 4–15)
NEUTROPHILS # BLD AUTO: 12.1 K/UL (ref 1.8–7.7)
NEUTROPHILS NFR BLD: 88 % (ref 38–73)
NRBC BLD-RTO: 0 /100 WBC
PLATELET # BLD AUTO: 202 K/UL (ref 150–450)
PMV BLD AUTO: 11.8 FL (ref 9.2–12.9)
RBC # BLD AUTO: 3.93 M/UL (ref 4–5.4)
SPECIMEN OUTDATE: NORMAL
WBC # BLD AUTO: 13.74 K/UL (ref 3.9–12.7)

## 2023-12-04 PROCEDURE — 94760 N-INVAS EAR/PLS OXIMETRY 1: CPT | Mod: GZ

## 2023-12-04 PROCEDURE — 36000710: Performed by: ORTHOPAEDIC SURGERY

## 2023-12-04 PROCEDURE — 97110 THERAPEUTIC EXERCISES: CPT

## 2023-12-04 PROCEDURE — 27130 PR TOTAL HIP ARTHROPLASTY: ICD-10-PCS | Mod: LT,,, | Performed by: ORTHOPAEDIC SURGERY

## 2023-12-04 PROCEDURE — 71000033 HC RECOVERY, INTIAL HOUR: Performed by: ORTHOPAEDIC SURGERY

## 2023-12-04 PROCEDURE — D9220A PRA ANESTHESIA: Mod: CRNA,,, | Performed by: NURSE ANESTHETIST, CERTIFIED REGISTERED

## 2023-12-04 PROCEDURE — D9220A PRA ANESTHESIA: Mod: ANES,,, | Performed by: ANESTHESIOLOGY

## 2023-12-04 PROCEDURE — 36000711: Performed by: ORTHOPAEDIC SURGERY

## 2023-12-04 PROCEDURE — 86900 BLOOD TYPING SEROLOGIC ABO: CPT | Performed by: ANESTHESIOLOGY

## 2023-12-04 PROCEDURE — 27201423 OPTIME MED/SURG SUP & DEVICES STERILE SUPPLY: Performed by: ORTHOPAEDIC SURGERY

## 2023-12-04 PROCEDURE — 25000003 PHARM REV CODE 250: Performed by: NURSE ANESTHETIST, CERTIFIED REGISTERED

## 2023-12-04 PROCEDURE — 63600175 PHARM REV CODE 636 W HCPCS: Performed by: NURSE ANESTHETIST, CERTIFIED REGISTERED

## 2023-12-04 PROCEDURE — 63600175 PHARM REV CODE 636 W HCPCS: Performed by: ANESTHESIOLOGY

## 2023-12-04 PROCEDURE — 94799 UNLISTED PULMONARY SVC/PX: CPT | Mod: XB

## 2023-12-04 PROCEDURE — 36415 COLL VENOUS BLD VENIPUNCTURE: CPT | Performed by: ORTHOPAEDIC SURGERY

## 2023-12-04 PROCEDURE — 97165 OT EVAL LOW COMPLEX 30 MIN: CPT

## 2023-12-04 PROCEDURE — C1776 JOINT DEVICE (IMPLANTABLE): HCPCS | Performed by: ORTHOPAEDIC SURGERY

## 2023-12-04 PROCEDURE — D9220A PRA ANESTHESIA: ICD-10-PCS | Mod: ANES,,, | Performed by: ANESTHESIOLOGY

## 2023-12-04 PROCEDURE — 71000015 HC POSTOP RECOV 1ST HR: Performed by: ORTHOPAEDIC SURGERY

## 2023-12-04 PROCEDURE — 25000003 PHARM REV CODE 250: Performed by: ANESTHESIOLOGY

## 2023-12-04 PROCEDURE — 85025 COMPLETE CBC W/AUTO DIFF WBC: CPT | Performed by: ORTHOPAEDIC SURGERY

## 2023-12-04 PROCEDURE — P9045 ALBUMIN (HUMAN), 5%, 250 ML: HCPCS | Mod: JZ,JG | Performed by: NURSE ANESTHETIST, CERTIFIED REGISTERED

## 2023-12-04 PROCEDURE — 71000016 HC POSTOP RECOV ADDL HR: Performed by: ORTHOPAEDIC SURGERY

## 2023-12-04 PROCEDURE — 36415 COLL VENOUS BLD VENIPUNCTURE: CPT | Performed by: ANESTHESIOLOGY

## 2023-12-04 PROCEDURE — 97116 GAIT TRAINING THERAPY: CPT

## 2023-12-04 PROCEDURE — 97161 PT EVAL LOW COMPLEX 20 MIN: CPT

## 2023-12-04 PROCEDURE — 37000009 HC ANESTHESIA EA ADD 15 MINS: Performed by: ORTHOPAEDIC SURGERY

## 2023-12-04 PROCEDURE — 37000008 HC ANESTHESIA 1ST 15 MINUTES: Performed by: ORTHOPAEDIC SURGERY

## 2023-12-04 PROCEDURE — 97535 SELF CARE MNGMENT TRAINING: CPT

## 2023-12-04 PROCEDURE — 71000039 HC RECOVERY, EACH ADD'L HOUR: Performed by: ORTHOPAEDIC SURGERY

## 2023-12-04 PROCEDURE — 63600175 PHARM REV CODE 636 W HCPCS: Performed by: ORTHOPAEDIC SURGERY

## 2023-12-04 PROCEDURE — D9220A PRA ANESTHESIA: ICD-10-PCS | Mod: CRNA,,, | Performed by: NURSE ANESTHETIST, CERTIFIED REGISTERED

## 2023-12-04 PROCEDURE — C1713 ANCHOR/SCREW BN/BN,TIS/BN: HCPCS | Performed by: ORTHOPAEDIC SURGERY

## 2023-12-04 PROCEDURE — 27130 TOTAL HIP ARTHROPLASTY: CPT | Mod: LT,,, | Performed by: ORTHOPAEDIC SURGERY

## 2023-12-04 PROCEDURE — 97530 THERAPEUTIC ACTIVITIES: CPT

## 2023-12-04 DEVICE — ARTICUL/EZE FEMORAL HEAD DIAMETER 32MM +9 12/14 TAPER
Type: IMPLANTABLE DEVICE | Site: HIP | Status: FUNCTIONAL
Brand: ARTICUL/EZE

## 2023-12-04 DEVICE — SUMMIT FEMORAL STEM 12/14 TAPER TAPER ED W/POROCOAT SIZE 5 STD 145MM
Type: IMPLANTABLE DEVICE | Site: HIP | Status: FUNCTIONAL
Brand: SUMMIT POROCOAT

## 2023-12-04 DEVICE — 1.7MM COCR CABLE WITH TI CRIMP 750MM-STERILE: Type: IMPLANTABLE DEVICE | Site: HIP | Status: FUNCTIONAL

## 2023-12-04 DEVICE — PINNACLE HIP SOLUTIONS ALTRX POLYETHYLENE ACETABULAR LINER +4 10 DEGREES 32MM ID 50MM OD
Type: IMPLANTABLE DEVICE | Site: HIP | Status: FUNCTIONAL
Brand: PINNACLE ALTRX

## 2023-12-04 DEVICE — PINNACLE GRIPTION ACETABULAR SHELL SECTOR 50MM OD
Type: IMPLANTABLE DEVICE | Site: HIP | Status: FUNCTIONAL
Brand: PINNACLE GRIPTION

## 2023-12-04 RX ORDER — HYDROCODONE BITARTRATE AND ACETAMINOPHEN 5; 325 MG/1; MG/1
1 TABLET ORAL EVERY 4 HOURS PRN
Status: DISCONTINUED | OUTPATIENT
Start: 2023-12-04 | End: 2023-12-04 | Stop reason: HOSPADM

## 2023-12-04 RX ORDER — OXYCODONE HYDROCHLORIDE 5 MG/1
5 TABLET ORAL ONCE AS NEEDED
Status: COMPLETED | OUTPATIENT
Start: 2023-12-04 | End: 2023-12-04

## 2023-12-04 RX ORDER — SODIUM CHLORIDE 0.9 % (FLUSH) 0.9 %
5 SYRINGE (ML) INJECTION
Status: DISCONTINUED | OUTPATIENT
Start: 2023-12-04 | End: 2023-12-04 | Stop reason: HOSPADM

## 2023-12-04 RX ORDER — MIDAZOLAM HYDROCHLORIDE 1 MG/ML
INJECTION, SOLUTION INTRAMUSCULAR; INTRAVENOUS
Status: DISCONTINUED | OUTPATIENT
Start: 2023-12-04 | End: 2023-12-04

## 2023-12-04 RX ORDER — LIDOCAINE HYDROCHLORIDE 10 MG/ML
INJECTION, SOLUTION INTRAVENOUS
Status: DISCONTINUED | OUTPATIENT
Start: 2023-12-04 | End: 2023-12-04

## 2023-12-04 RX ORDER — ACETAMINOPHEN 500 MG
1000 TABLET ORAL
Status: DISCONTINUED | OUTPATIENT
Start: 2023-12-04 | End: 2023-12-04 | Stop reason: HOSPADM

## 2023-12-04 RX ORDER — FENTANYL CITRATE 50 UG/ML
25 INJECTION, SOLUTION INTRAMUSCULAR; INTRAVENOUS EVERY 5 MIN PRN
Status: DISCONTINUED | OUTPATIENT
Start: 2023-12-04 | End: 2023-12-04 | Stop reason: HOSPADM

## 2023-12-04 RX ORDER — ONDANSETRON 2 MG/ML
INJECTION INTRAMUSCULAR; INTRAVENOUS
Status: DISCONTINUED | OUTPATIENT
Start: 2023-12-04 | End: 2023-12-04

## 2023-12-04 RX ORDER — DEXAMETHASONE SODIUM PHOSPHATE 4 MG/ML
INJECTION, SOLUTION INTRA-ARTICULAR; INTRALESIONAL; INTRAMUSCULAR; INTRAVENOUS; SOFT TISSUE
Status: DISCONTINUED | OUTPATIENT
Start: 2023-12-04 | End: 2023-12-04

## 2023-12-04 RX ORDER — MUPIROCIN 20 MG/G
1 OINTMENT TOPICAL 2 TIMES DAILY
Status: DISCONTINUED | OUTPATIENT
Start: 2023-12-04 | End: 2023-12-04 | Stop reason: HOSPADM

## 2023-12-04 RX ORDER — SODIUM CHLORIDE, SODIUM LACTATE, POTASSIUM CHLORIDE, CALCIUM CHLORIDE 600; 310; 30; 20 MG/100ML; MG/100ML; MG/100ML; MG/100ML
INJECTION, SOLUTION INTRAVENOUS CONTINUOUS
Status: DISCONTINUED | OUTPATIENT
Start: 2023-12-04 | End: 2023-12-04 | Stop reason: HOSPADM

## 2023-12-04 RX ORDER — FAMOTIDINE 20 MG/1
20 TABLET, FILM COATED ORAL 2 TIMES DAILY
Status: CANCELLED | OUTPATIENT
Start: 2023-12-04

## 2023-12-04 RX ORDER — ZOLPIDEM TARTRATE 5 MG/1
5 TABLET ORAL NIGHTLY PRN
Status: DISCONTINUED | OUTPATIENT
Start: 2023-12-04 | End: 2023-12-04 | Stop reason: HOSPADM

## 2023-12-04 RX ORDER — EPHEDRINE SULFATE 50 MG/ML
INJECTION, SOLUTION INTRAVENOUS
Status: DISCONTINUED | OUTPATIENT
Start: 2023-12-04 | End: 2023-12-04

## 2023-12-04 RX ORDER — METOCLOPRAMIDE HYDROCHLORIDE 5 MG/ML
10 INJECTION INTRAMUSCULAR; INTRAVENOUS EVERY 10 MIN PRN
Status: DISCONTINUED | OUTPATIENT
Start: 2023-12-04 | End: 2023-12-04 | Stop reason: HOSPADM

## 2023-12-04 RX ORDER — BUPIVACAINE HYDROCHLORIDE 5 MG/ML
INJECTION, SOLUTION EPIDURAL; INTRACAUDAL
Status: COMPLETED | OUTPATIENT
Start: 2023-12-04 | End: 2023-12-04

## 2023-12-04 RX ORDER — ACETAMINOPHEN 10 MG/ML
INJECTION, SOLUTION INTRAVENOUS
Status: DISCONTINUED | OUTPATIENT
Start: 2023-12-04 | End: 2023-12-04

## 2023-12-04 RX ORDER — BISACODYL 10 MG
10 SUPPOSITORY, RECTAL RECTAL DAILY
Status: DISCONTINUED | OUTPATIENT
Start: 2023-12-05 | End: 2023-12-04 | Stop reason: HOSPADM

## 2023-12-04 RX ORDER — NAPROXEN SODIUM 220 MG/1
81 TABLET, FILM COATED ORAL 2 TIMES DAILY
Status: DISCONTINUED | OUTPATIENT
Start: 2023-12-04 | End: 2023-12-04 | Stop reason: HOSPADM

## 2023-12-04 RX ORDER — MIDAZOLAM HYDROCHLORIDE 1 MG/ML
2 INJECTION INTRAMUSCULAR; INTRAVENOUS
Status: DISCONTINUED | OUTPATIENT
Start: 2023-12-04 | End: 2023-12-04 | Stop reason: HOSPADM

## 2023-12-04 RX ORDER — CELECOXIB 100 MG/1
400 CAPSULE ORAL
Status: DISCONTINUED | OUTPATIENT
Start: 2023-12-04 | End: 2023-12-04 | Stop reason: HOSPADM

## 2023-12-04 RX ORDER — DEXTROSE MONOHYDRATE AND SODIUM CHLORIDE 5; .45 G/100ML; G/100ML
INJECTION, SOLUTION INTRAVENOUS CONTINUOUS
Status: DISCONTINUED | OUTPATIENT
Start: 2023-12-04 | End: 2023-12-04 | Stop reason: HOSPADM

## 2023-12-04 RX ORDER — CEFAZOLIN SODIUM 2 G/50ML
2 SOLUTION INTRAVENOUS
Status: COMPLETED | OUTPATIENT
Start: 2023-12-04 | End: 2023-12-04

## 2023-12-04 RX ORDER — LIDOCAINE HYDROCHLORIDE 10 MG/ML
1 INJECTION, SOLUTION EPIDURAL; INFILTRATION; INTRACAUDAL; PERINEURAL ONCE
Status: DISCONTINUED | OUTPATIENT
Start: 2023-12-04 | End: 2023-12-04 | Stop reason: HOSPADM

## 2023-12-04 RX ORDER — ALBUMIN HUMAN 50 G/1000ML
SOLUTION INTRAVENOUS
Status: DISCONTINUED | OUTPATIENT
Start: 2023-12-04 | End: 2023-12-04

## 2023-12-04 RX ORDER — PROPOFOL 10 MG/ML
VIAL (ML) INTRAVENOUS CONTINUOUS PRN
Status: DISCONTINUED | OUTPATIENT
Start: 2023-12-04 | End: 2023-12-04

## 2023-12-04 RX ORDER — FENTANYL CITRATE 50 UG/ML
100 INJECTION, SOLUTION INTRAMUSCULAR; INTRAVENOUS SEE ADMIN INSTRUCTIONS
Status: DISCONTINUED | OUTPATIENT
Start: 2023-12-04 | End: 2023-12-04 | Stop reason: HOSPADM

## 2023-12-04 RX ORDER — FENTANYL CITRATE 50 UG/ML
INJECTION, SOLUTION INTRAMUSCULAR; INTRAVENOUS
Status: DISCONTINUED | OUTPATIENT
Start: 2023-12-04 | End: 2023-12-04

## 2023-12-04 RX ORDER — OXYCODONE HCL 10 MG/1
10 TABLET, FILM COATED, EXTENDED RELEASE ORAL
Status: DISCONTINUED | OUTPATIENT
Start: 2023-12-04 | End: 2023-12-04 | Stop reason: HOSPADM

## 2023-12-04 RX ORDER — PHENYLEPHRINE HYDROCHLORIDE 10 MG/ML
INJECTION INTRAVENOUS
Status: DISCONTINUED | OUTPATIENT
Start: 2023-12-04 | End: 2023-12-04

## 2023-12-04 RX ADMIN — ALBUMIN (HUMAN) 250 ML: 12.5 SOLUTION INTRAVENOUS at 11:12

## 2023-12-04 RX ADMIN — PHENYLEPHRINE HYDROCHLORIDE 100 MCG: 10 INJECTION INTRAVENOUS at 11:12

## 2023-12-04 RX ADMIN — PROPOFOL 50 MCG/KG/MIN: 10 INJECTION, EMULSION INTRAVENOUS at 10:12

## 2023-12-04 RX ADMIN — EPHEDRINE SULFATE 15 MG: 50 INJECTION, SOLUTION INTRAMUSCULAR; INTRAVENOUS; SUBCUTANEOUS at 11:12

## 2023-12-04 RX ADMIN — ONDANSETRON 4 MG: 2 INJECTION INTRAMUSCULAR; INTRAVENOUS at 10:12

## 2023-12-04 RX ADMIN — SODIUM CHLORIDE, SODIUM GLUCONATE, SODIUM ACETATE, POTASSIUM CHLORIDE AND MAGNESIUM CHLORIDE: 526; 502; 368; 37; 30 INJECTION, SOLUTION INTRAVENOUS at 11:12

## 2023-12-04 RX ADMIN — MIDAZOLAM 1 MG: 1 INJECTION INTRAMUSCULAR; INTRAVENOUS at 10:12

## 2023-12-04 RX ADMIN — PHENYLEPHRINE HYDROCHLORIDE 100 MCG: 10 INJECTION INTRAVENOUS at 10:12

## 2023-12-04 RX ADMIN — EPHEDRINE SULFATE 20 MG: 50 INJECTION, SOLUTION INTRAMUSCULAR; INTRAVENOUS; SUBCUTANEOUS at 11:12

## 2023-12-04 RX ADMIN — LIDOCAINE HYDROCHLORIDE 20 MG: 10 INJECTION, SOLUTION INTRAVENOUS at 10:12

## 2023-12-04 RX ADMIN — PHENYLEPHRINE HYDROCHLORIDE 200 MCG: 10 INJECTION INTRAVENOUS at 11:12

## 2023-12-04 RX ADMIN — OXYCODONE 5 MG: 5 TABLET ORAL at 01:12

## 2023-12-04 RX ADMIN — ACETAMINOPHEN 1000 MG: 10 INJECTION, SOLUTION INTRAVENOUS at 10:12

## 2023-12-04 RX ADMIN — PHENYLEPHRINE HYDROCHLORIDE 200 MCG: 10 INJECTION INTRAVENOUS at 10:12

## 2023-12-04 RX ADMIN — SODIUM CHLORIDE, SODIUM GLUCONATE, SODIUM ACETATE, POTASSIUM CHLORIDE AND MAGNESIUM CHLORIDE: 526; 502; 368; 37; 30 INJECTION, SOLUTION INTRAVENOUS at 09:12

## 2023-12-04 RX ADMIN — FENTANYL CITRATE 50 MCG: 50 INJECTION, SOLUTION INTRAMUSCULAR; INTRAVENOUS at 11:12

## 2023-12-04 RX ADMIN — BUPIVACAINE HYDROCHLORIDE 2 ML: 5 INJECTION, SOLUTION EPIDURAL; INTRACAUDAL; PERINEURAL at 10:12

## 2023-12-04 RX ADMIN — CEFAZOLIN SODIUM 2 G: 2 SOLUTION INTRAVENOUS at 10:12

## 2023-12-04 RX ADMIN — DEXAMETHASONE SODIUM PHOSPHATE 8 MG: 4 INJECTION, SOLUTION INTRA-ARTICULAR; INTRALESIONAL; INTRAMUSCULAR; INTRAVENOUS; SOFT TISSUE at 10:12

## 2023-12-04 NOTE — PT/OT/SLP EVAL
Physical Therapy Evaluation and Discharge Note    Patient Name:  Olivia Mix   MRN:  [id number]    Recommendations:     Discharge Recommendations: Low Intensity Therapy  Discharge Equipment Recommendations: none   Barriers to discharge: None    Assessment:     Olivia Mix is a 73 y.o. female admitted with a medical diagnosis of left VELMA .  At this time, patient is scheduled for discharge home appears will be safe with mobility in the home. Patient would benefit though from HHPT to work on strengthening to further increase safety with mobility.  Patient BP initially low  and went down as low as 61/29 with patient reporting feeling dizzy. Nurse informed so nurse started fluid IV on patient and BP returned to > 120/75 even following gait.  Patient also reported not feeling dizzy once BP improved so patient felt she was ready to go home safely.      Recent Surgery: Procedure(s) (LRB):  ARTHROPLASTY, HIP REPLACEMENT, LEFT (Left) Day of Surgery    Plan:     During this hospitalization, patient does not require further acute PT services.  Please re-consult if situation changes.      Subjective     Chief Complaint: pain and feeling dizzy  Patient/Family Comments/goals: go home  Pain/Comfort:  Pain Rating 1: 1/10  Location - Side 1: Left  Location - Orientation 1: lower  Location 1: hip  Pain Addressed 1: Reposition, Cessation of Activity  Pain Rating Post-Intervention 1: 4/10    Patients cultural, spiritual, Catholic conflicts given the current situation:      Living Environment:  Currently lives alone in 1 story home with 1 step entry but daughter lives next door and will stay as  long as necessary.  Prior to admission, patients level of function was independent.  Equipment used at home: walker, rolling.  DME owned (not currently used): none.  Upon discharge, patient will have assistance from family.    Objective:     Communicated with nurse prior to session.  Patient found supine with cryotherapy upon PT entry to  "room.    General Precautions: Standard, fall    Orthopedic Precautions:LLE weight bearing as tolerated, LLE posterior precautions   Braces:    Respiratory Status: Room air    Exams:  RLE ROM: WFL  RLE Strength: WNL  LLE ROM: not tested  LLE Strength: not tested    Functional Mobility:  Bed Mobility:     Supine to Sit: stand by assistance  Transfers:     Sit to Stand:  contact guard assistance with rolling walker  Gait: x 150 feet CGA  Stairs:  Pt ascended/descended 4" curb step with Rolling Walker with no handrails with Contact Guard Assistance.     AM-PAC 6 CLICK MOBILITY  Total Score:20       Treatment and Education:  Exercise to include ankle pump,quad sets and glut sets.  Gait training x 400 feet RW cGA, x 200 feet rW sBA, up/down 4 inch step x 2 times RW cGA  Transfer training sit to stand RW CGA/SBA, BSC transfer RW cGA,   Standing tolerance/balance x 90 seconds to get brief put back in place and pull pants with CGA    AM-PAC 6 CLICK MOBILITY  Total Score:20     Patient left up in chair with call button in reach, nurse notified, and daughter present.    GOALS:   Multidisciplinary Problems       Physical Therapy Goals       Not on file                    History:     Past Medical History:   Diagnosis Date    Colon polyp     Hypercholesterolemia     Thyroid disease        Past Surgical History:   Procedure Laterality Date    AUGMENTATION OF BREAST      breast augmentation      CHEILECTOMY      COLONOSCOPY      10 years ago    HYSTERECTOMY         Time Tracking:     PT Received On: 12/04/23  PT Start Time: 1545     PT Stop Time: 1650  PT Total Time (min): 65 min     Billable Minutes: Evaluation 20, Gait Training 20, Therapeutic Activity 15, and Therapeutic Exercise 10      12/04/2023  "

## 2023-12-04 NOTE — OP NOTE
[facility]  Surgery Department  Operative Note    SUMMARY     Date of Procedure: 12/4/2023     Procedure:  Left total hip arthroplasty    Surgeon(s) and Role:     * Boom Gunter MD - Primary    Assisting Surgeon:  Dante    Pre-Operative Diagnosis: Primary osteoarthritis of left hip [M16.12]    Post-Operative Diagnosis: Post-Op Diagnosis Codes:     * Primary osteoarthritis of left hip [M16.12]    Anesthesia: Spinal    Intraoperative Findings:  Bone-on-bone arthritis left hip    Description of the Findings of the Procedure:  Patient was placed on the operative table in lateral decubitus position.  The left hip was prepped and draped in the usual sterile manner for surgery.  At this point a posterior lateral approach undertaken to the hip was carried down sharply through the skin.  The deep fascia was incised in line with its fibers.  The short external rotators were visualized.  The sciatic nerve was visualized.  The Charnley retractors were placed being very careful not to entrap the nerve.  The short external rotators were taken down and tagged for later reattachment.  The capsule was split.  There was an infusion.  The hip was dislocated.  Preliminary head cut was made.  The head was removed.  It was noted to be totally devoid of cartilage.  We used a cookie cutter and then the canal finer and then the lateralize and then we reamed up to a 5.  We now broached to a 5 and that gave us excellent fit and fill.  At this point we turned our attention to the acetabulum.  Retractors were placed anteriorly and posteriorly.  The foveal contents were cleaned we started reaming with a 43 and then expanded to a 49.  That gave us a good round cup with good bleeding bone.  I tapped a 50 mm cup into position we had an excellent Press-Fit.  A liner was tapped into position.  We now began trialing.  I dropped our stem down again we trialed.  We trialed with the up to a +9 and that gave us excellent  leg lengths excellent stability an excellent range of motion.  I tapped my actual component into position.  I copiously irrigated.  At this point I noted a small crack in the region of the greater trochanter.  It was nondisplaced.  It extended proximally 2 cm.  It was very stable.  However I felt the safest thing to do was to place cables around the proximal femur to ensure that this would not propagate.  Therefore I sequentially placed 3 cables from distal to proximal around the proximal femur.  This was done with the cable passing system being very careful to stay directly on bone.  The cables were sequentially tightened to 50 kg and then crimped.  At this point we copiously study the fracture there was no propagation and no instability even after reduction.  We copiously irrigated.  We closed the capsule with 2. FiberWire.  We closed the piriformis was 2. FiberWire.  The sciatic nerve was noted to be in good position and condition we closed the deep fascia with a running 1. Strattice fixed.  The subcu was closed with 2-0 Vicryl and the skin with 4-0 Monocryl.  Sterile dressings were applied and the patient was noted to be in stable condition    Complications: No    Estimated Blood Loss (EBL): 600 mL           Implants:   Implant Name Type Inv. Item Serial No.  Lot No. LRB No. Used Action   CUP ACT PINC SECT 50MM TITANIU - [id number]  CUP ACT PINC SECT 50MM TITANIU  [employer] [id number] Left 1 Implanted   LINER ACT PINNACLE 10 [id number] - [id number]  LINER ACT PINNACLE 10 [id number]  [employer] [id number] Left 1 Implanted   HIP STEM SUMMITT - [id number]  HIP STEM SUMMITT  [employer] [id number] Left 1 Implanted   HEAD FEM ARTTICUL 32MM +9MM - [id number]  HEAD FEM ARTTICUL 32MM +9MM  [employer] [id number] Left 1 Implanted   CRIMP 1.7MM W/CABLE 750MM - [id number]  CRIMP 1.7MM W/CABLE 750MM  [employer] [id number] Left 1 Implanted   CRIMP 1.7MM W/CABLE 750MM - [id number]  CRIMP 1.7MM W/CABLE 750MM  [employer] [id number] Left 1  Implanted   CRIMP 1.7MM W/CABLE 750MM - [id number]  CRIMP 1.7MM W/CABLE 750MM  [employer] [id number] Left 1 Implanted       Specimens:   Specimen (24h ago, onward)      None                    Condition: Good    Disposition: PACU - hemodynamically stable.              Discharge Note    SUMMARY     Admit Date: 12/4/2023    Discharge Date and Time:  12/04/2023 11:57 AM    Hospital Course (synopsis of major diagnoses, care, treatment, and services provided during the course of the hospital stay): Uneventful      Final Diagnosis: Post-Op Diagnosis Codes:     * Primary osteoarthritis of left hip [M16.12]    Disposition: Home or Self Care    Follow Up/Patient Instructions:     Medications:  Reconciled Home Medications:   Current Discharge Medication List        CONTINUE these medications which have NOT CHANGED    Details   aspirin (ECOTRIN) 81 MG EC tablet Take 1 tablet (81 mg total) by mouth every 12 (twelve) hours.  Qty: 60 tablet, Refills: 0    Associated Diagnoses: Primary osteoarthritis of left hip      atorvastatin (LIPITOR) 20 MG tablet Take 1 tablet (20 mg total) by mouth once daily.  Qty: 90 tablet, Refills: 3      co-enzyme Q-10 30 mg capsule Take 30 mg by mouth 3 (three) times a week.      levothyroxine (SYNTHROID) 100 MCG tablet Take one tab 6 days a week, take 1/2 tab one day a week  Qty: 90 tablet, Refills: 3      multivitamin (ONE DAILY MULTIVITAMIN) per tablet Take 1 tablet by mouth once daily.      celecoxib (CELEBREX) 200 MG capsule Take 1 capsule (200 mg total) by mouth 2 (two) times daily.  Qty: 60 capsule, Refills: 0    Associated Diagnoses: Primary osteoarthritis of left hip      docusate sodium (COLACE) 100 MG capsule Take 1 capsule (100 mg total) by mouth 2 (two) times daily.  Qty: 60 capsule, Refills: 0    Associated Diagnoses: Primary osteoarthritis of left hip      gabapentin (NEURONTIN) 300 MG capsule Take 1 capsule (300 mg total) by mouth 2 (two) times daily.  Qty: 60 capsule, Refills: 0     Associated Diagnoses: Primary osteoarthritis of left hip      oxyCODONE-acetaminophen (PERCOCET) 7.5-325 mg per tablet Take 1 tablet by mouth every 6 (six) hours as needed for Pain.  Qty: 28 tablet, Refills: 0    Comments: This prescription and the attached 4 other prescriptions are for postoperative use for the patient's scheduled total joint arthroplasty on Monday December 4, 2023.  This is for the med to bed program.  Associated Diagnoses: Primary osteoarthritis of left hip           Discharge Procedure Orders   Diet general     Call MD for:  temperature >100.4     Call MD for:  persistent nausea and vomiting     Call MD for:  severe uncontrolled pain     Call MD for:  difficulty breathing, headache or visual disturbances     Call MD for:  redness, tenderness, or signs of infection (pain, swelling, redness, odor or green/yellow discharge around incision site)     Call MD for:  hives     Call MD for:  persistent dizziness or light-headedness     Call MD for:  extreme fatigue      Follow-up Information       [facility]. Call in 1 day(s).    Specialties: Home Health Services, Home Therapy Services, Home Living Aide Services  Contact information:  [id number] [facility] [id number] [phone]

## 2023-12-04 NOTE — H&P
CC/Indication for Procedure: 73 y.o. female with Primary osteoarthritis of left hip [M16.12].    Patient scheduled for SD TOTAL HIP ARTHROPLASTY [66316] (ARTHROPLASTY, HIP REPLACEMENT, LEFT)  SD TOTAL HIP ARTHROPLASTY [55168].    Past Medical History:   Diagnosis Date    Colon polyp     Hypercholesterolemia     Thyroid disease      Past Surgical History:   Procedure Laterality Date    AUGMENTATION OF BREAST      breast augmentation      CHEILECTOMY      COLONOSCOPY      10 years ago    HYSTERECTOMY       Family History   Problem Relation Age of Onset    Breast cancer Mother 82    No Known Problems Daughter     Melanoma Neg Hx     Colon cancer Neg Hx     Colon polyps Neg Hx     Cystic fibrosis Neg Hx     Esophageal cancer Neg Hx     Liver cancer Neg Hx     Rectal cancer Neg Hx     Stomach cancer Neg Hx     Ulcerative colitis Neg Hx      Social History     Socioeconomic History    Marital status:    Tobacco Use    Smoking status: Never    Smokeless tobacco: Never   Substance and Sexual Activity    Alcohol use: Yes     Comment: social    Drug use: Not Currently     Social Determinants of Health     Stress: No Stress Concern Present (6/25/2020)    [facility] - Occupational Stress Questionnaire     Feeling of Stress : Not at all       Review of patient's allergies indicates:  No Known Allergies      Current Facility-Administered Medications:     acetaminophen tablet 1,000 mg, 1,000 mg, Oral, Once Pre-Op, Tristen Sinha MD    cefazolin (ANCEF) 2 gram in dextrose 5% 50 mL IVPB (premix), 2 g, Intravenous, On Call Procedure, Finger, MD Boom    celecoxib capsule 400 mg, 400 mg, Oral, Once Pre-Op, Tristen Sinha MD    electrolyte-S (ISOLYTE), , Intravenous, Continuous, Tristen Sinha MD, Last Rate: 10 mL/hr at 12/04/23 0915, New Bag at 12/04/23 0915    fentaNYL 50 mcg/mL injection 100 mcg, 100 mcg, Intravenous, See admin instructions, Tristen Sinha MD    lactated ringers  infusion, , Intravenous, Continuous, Tristen Sinha MD    LIDOcaine (PF) 10 mg/ml (1%) injection 10 mg, 1 mL, Intradermal, Once, Tristen Sinha MD    midazolam (VERSED) 1 mg/mL injection 2 mg, 2 mg, Intravenous, PRN, Tristen Sinha MD    oxyCODONE 12 hr tablet 10 mg, 10 mg, Oral, Once Pre-Op, Tristen Sinha MD    tranexamic acid (CYKLOKAPRON) 1,000 mg in sodium chloride 0.9 % 100 mL IVPB (MB+), 1,000 mg, Intravenous, On Call Procedure, Finger, MD Boom    ROS:    Denies chest pain or palpitations  Denies shortness of breath  Denies fevers or chills  Denies chest pain  Denies abdominal pain    PE:    General Appearance: Well nourished  Orientation: Oriented to time, place, person  Mental Status: Alert  Heart: RRR  Lungs: CTA  Abdomen: Soft and non-tender    Anesthesia/Surgery risks, benefits and alternative options discussed and understood by patient/family.    This note was created using Dragon voice recognition software that occasionally misinterpreted phrases or words.

## 2023-12-04 NOTE — ANESTHESIA PREPROCEDURE EVALUATION
12/04/2023  Olivia Mix is a 73 y.o., female.      Pre-op Assessment    I have reviewed the Patient Summary Reports.     I have reviewed the Nursing Notes. I have reviewed the NPO Status.   I have reviewed the Medications.     Review of Systems  Anesthesia Hx:             Denies Family Hx of Anesthesia complications.    Denies Personal Hx of Anesthesia complications.                    Cardiovascular:                hyperlipidemia   ECG has been reviewed.                          Musculoskeletal:  Arthritis               Endocrine:   Hypothyroidism              Physical Exam  General: Well nourished, Cooperative, Alert and Oriented    Airway:  Mallampati: II   Mouth Opening: Normal  TM Distance: Normal  Tongue: Normal  Neck ROM: Normal ROM        Anesthesia Plan  Type of Anesthesia, risks & benefits discussed:    Anesthesia Type: Spinal  Intra-op Monitoring Plan: Standard ASA Monitors  Post Op Pain Control Plan: multimodal analgesia  Informed Consent: Informed consent signed with the Patient and all parties understand the risks and agree with anesthesia plan.  All questions answered.   ASA Score: 2    Ready For Surgery From Anesthesia Perspective.     .

## 2023-12-04 NOTE — ANESTHESIA POSTPROCEDURE EVALUATION
Anesthesia Post Evaluation    Patient: Olivia Mix    Procedure(s) Performed: Procedure(s) (LRB):  ARTHROPLASTY, HIP REPLACEMENT, LEFT (Left)    Final Anesthesia Type: spinal      Patient location during evaluation: PACU  Patient participation: Yes- Able to Participate  Level of consciousness: awake and alert  Post-procedure vital signs: reviewed and stable  Pain management: adequate  Airway patency: patent    PONV status at discharge: No PONV  Anesthetic complications: no      Cardiovascular status: blood pressure returned to baseline  Respiratory status: unassisted  Hydration status: euvolemic  Follow-up not needed.              Vitals Value Taken Time   /50 12/04/23 1420   Temp 36.8 °C (98.2 °F) 12/04/23 1215   Pulse 86 12/04/23 1420   Resp 17 12/04/23 1420   SpO2 99 % 12/04/23 1420         Event Time   Out of Recovery 14:02:00         Pain/Abelino Score: Pain Rating Prior to Med Admin: 2 (12/4/2023  1:51 PM)  Abelino Score: 9 (12/4/2023  2:02 PM)

## 2023-12-04 NOTE — OR NURSING
"Patient resting quietly eating a sandwich, daughter at side. Patient able to to move left toes, states " its better, but still numb". Scheduled PT and OT   "

## 2023-12-04 NOTE — PT/OT/SLP EVAL
Occupational Therapy   Evaluation and Discharge Note    Name: Olivia Mix  MRN: [id number]  Admitting Diagnosis: <principal problem not specified>  Recent Surgery: Procedure(s) (LRB):  ARTHROPLASTY, HIP REPLACEMENT, LEFT (Left) Day of Surgery    Recommendations:     Discharge Recommendations: Low Intensity Therapy  Discharge Equipment Recommendations: none; Pt owns a shower chair and a raised toilet seat.   Barriers to discharge:  None    Assessment:     Olivia Mix is a 73 y.o. female with a medical diagnosis of left VELMA. Pt agreed to participate in OT. Pt was given education on hip precautions including no hip flexion greater than 90 degrees, no IR of hip and no adduction of hip. Pt recalled 2/3 precautions prior to ADLs. Pt recalled 3/3 precautions after to ADLs. Pt required min A with supine <> sit. Pt required min A and verbal cues for hand placement with sit <> stand transfer and bed > chair transfer using a RW. Pt required set up A/SBA with UBD seated EOB. Pt declined instruction of use of assistive devices. Pt required mod A to don her pants. Pt required max A to doff her socks and don her shoes. Pt was given education on use of DME including a raised toilet seat and shower chair to maintain precautions during functional transfers. Pt is scheduled for discharge today and would benefit from skilled home health OT services to increase her independence with ADLs and functional transfer skills to return to her PLOF.     Patient's BP initially low. Nurse notified. Patient's BP started to elevate during session. Patient left supine in chair with nurse notified, patient's daughter, PT and rehab tech present.      Plan:     During this hospitalization, patient does not require further acute OT services.  Please re-consult if situation changes.      Subjective     Chief Complaint: Dizziness  Patient/Family Comments/goals: Patient agreed to participate in OT.     Occupational Profile:  Living Environment: Pt lives  alone.   Previous level of function: Independent  Equipment Used at home: none  Assistance upon Discharge: Pt will have assistance from her daughter.    Pain/Comfort:  Pain Rating 1: 4/10  Location - Side 1: Left  Location 1: hip    Patients cultural, spiritual, [employer] conflicts given the current situation: yes    Objective:     Communicated with: nurse prior to session.  Patient found HOB elevated with blood pressure cuff, hip abduction pillow and peripheral IV upon OT entry to room.    General Precautions: Standard, fall  Orthopedic Precautions: LLE weight bearing as tolerated, LLE posterior precautions  Braces: hip abduction pillow  Respiratory Status: Room air     Occupational Performance:    Bed Mobility:    Patient completed Scooting/Bridging with stand by assistance  Patient completed Supine to Sit with minimum assistance    Functional Mobility/Transfers:  Patient completed Sit <> Stand Transfer with minimum assistance with rolling walker   Patient completed Bed <> Chair Transfer using Step Transfer technique with minimum assistance with rolling walker    Activities of Daily Living:  Upper Body Dressing: stand by assistance  Lower Body Dressing: moderate assistance    Cognitive/Visual Perceptual:  Cognitive/Psychosocial Skills:  -       Oriented to: Person, Place, Time, and Situation   -       Follows Commands/attention: Follows multistep commands  -       Communication: clear/fluent    Physical Exam:  Upper Extremity Range of Motion:  -       Right Upper Extremity: WFL  -       Left Upper Extremity: WFL  Upper Extremity Strength: -       Right Upper Extremity: WFL  -       Left Upper Extremity: WFL      Treatment & Education:  Educated on posterior hip precautions - patient recalled hip precautions   Educated on safety with functional mobility; hand placement to ensure safe transfers to various surfaces in prep for ADLs   Therapist provided facilitation and instruction of proper body mechanics, energy  conservation and fall prevention strategies during tasks listed above.    Patient left up in chair with nurse notified, patient's daughter, PT and rehab tech present.    History:     Past Medical History:   Diagnosis Date    Colon polyp     Hypercholesterolemia     Thyroid disease          Past Surgical History:   Procedure Laterality Date    AUGMENTATION OF BREAST      breast augmentation      CHEILECTOMY      COLONOSCOPY      10 years ago    HYSTERECTOMY         Time Tracking:     OT Date of Treatment: 12/04/23  OT Start Time: 1505  OT Stop Time: 1544  OT Total Time (min): 39 min    Billable Minutes:Evaluation 15  Self Care/Home Management 24    12/4/2023

## 2023-12-04 NOTE — PLAN OF CARE
Problem: Occupational Therapy  Goal: Occupational Therapy Goal  Description: Goals to be met by: 12/11/2023     Patient will increase functional independence with ADLs by performing:    UE Dressing with [place].  LE Dressing with Stand-by Assistance.  Grooming with Modified [place].  Toileting from toilet with Modified [place] for hygiene and clothing management.   Toilet transfer to toilet with Modified [place].    Outcome: Ongoing, Progressing     OT evaluation completed. POC established.

## 2023-12-04 NOTE — DISCHARGE INSTRUCTIONS
"Discharge Instructions: After Your Surgery/Procedure  Youve just had surgery. During surgery you were given medicine called anesthesia to keep you relaxed and free of pain. After surgery you may have some pain or nausea. This is common. Here are some tips for feeling better and getting well after surgery.     Stay on schedule with your medication.   Going home  Your doctor or nurse will show you how to take care of yourself when you go home. He or she will also answer your questions. Have an adult family member or friend drive you home.      For your safety we recommend these precaution for the first 24 hours after your procedure:  Do not drive or use heavy equipment.  Do not make important decisions or sign legal papers.  Do not drink alcohol.  Have someone stay with you, if needed. He or she can watch for problems and help keep you safe.  Your concentration, balance, coordination, and judgement may be impaired for many hours after anesthesia.  Use caution when ambulating or standing up.     You may feel weak and "washed out" after anesthesia and surgery.      Subtle residual effects of general anesthesia or sedation with regional / local anesthesia can last more than 24 hours.  Rest for the remainder of the day or longer if your Doctor/Surgeon has advised you to do so.  Although you may feel normal within the first 24 hours, your reflexes and mental ability may be impaired without you realizing it.  You may feel dizzy, lightheaded or sleepy for 24 hours or longer.      Be sure to go to all follow-up visits with your doctor. And rest after your surgery for as long as your doctor tells you to.  Coping with pain  If you have pain after surgery, pain medicine will help you feel better. Take it as told, before pain becomes severe. Also, ask your doctor or pharmacist about other ways to control pain. This might be with heat, ice, or relaxation. And follow any other instructions your surgeon or nurse gives you.  Tips " for taking pain medicine  To get the best relief possible, remember these points:  Pain medicines can upset your stomach. Taking them with a little food may help.  Most pain relievers taken by mouth need at least 20 to 30 minutes to start to work.  Taking medicine on a schedule can help you remember to take it. Try to time your medicine so that you can take it before starting an activity. This might be before you get dressed, go for a walk, or sit down for dinner.  Constipation is a common side effect of pain medicines. Call your doctor before taking any medicines such as laxatives or stool softeners to help ease constipation. Also ask if you should skip any foods. Drinking lots of fluids and eating foods such as fruits and vegetables that are high in fiber can also help. Remember, do not take laxatives unless your surgeon has prescribed them.  Drinking alcohol and taking pain medicine can cause dizziness and slow your breathing. It can even be deadly. Do not drink alcohol while taking pain medicine.  Pain medicine can make you react more slowly to things. Do not drive or run machinery while taking pain medicine.  Your health care provider may tell you to take acetaminophen to help ease your pain. Ask him or her how much you are supposed to take each day. Acetaminophen or other pain relievers may interact with your prescription medicines or other over-the-counter (OTC) drugs. Some prescription medicines have acetaminophen and other ingredients. Using both prescription and OTC acetaminophen for pain can cause you to overdose. Read the labels on your OTC medicines with care. This will help you to clearly know the list of ingredients, how much to take, and any warnings. It may also help you not take too much acetaminophen. If you have questions or do not understand the information, ask your pharmacist or health care provider to explain it to you before you take the OTC medicine.  Managing nausea  Some people have an  upset stomach after surgery. This is often because of anesthesia, pain, or pain medicine, or the stress of surgery. These tips will help you handle nausea and eat healthy foods as you get better. If you were on a special food plan before surgery, ask your doctor if you should follow it while you get better. These tips may help:  Do not push yourself to eat. Your body will tell you when to eat and how much.  Start off with clear liquids and soup. They are easier to digest.  Next try semi-solid foods, such as mashed potatoes, applesauce, and gelatin, as you feel ready.  Slowly move to solid foods. Dont eat fatty, rich, or spicy foods at first.  Do not force yourself to have 3 large meals a day. Instead eat smaller amounts more often.  Take pain medicines with a small amount of solid food, such as crackers or toast, to avoid nausea.     Call your surgeon if  You still have pain an hour after taking medicine. The medicine may not be strong enough.  You feel too sleepy, dizzy, or groggy. The medicine may be too strong.  You have side effects like nausea, vomiting, or skin changes, such as rash, itching, or hives.       If you have obstructive sleep apnea  You were given anesthesia medicine during surgery to keep you comfortable and free of pain. After surgery, you may have more apnea spells because of this medicine and other medicines you were given. The spells may last longer than usual.   At home:  Keep using the continuous positive airway pressure (CPAP) device when you sleep. Unless your health care provider tells you not to, use it when you sleep, day or night. CPAP is a common device used to treat obstructive sleep apnea.  Talk with your provider before taking any pain medicine, muscle relaxants, or sedatives. Your provider will tell you about the possible dangers of taking these medicines.  © 5722-3827 [employer]. [address] [place] [address], [place] [address]. All rights reserved. This information is  not intended as a substitute for professional medical care. Always follow your healthcare professional's instructions.          Using an Incentive Spirometer    An incentive spirometer is a device that helps you do deep breathing exercises. These exercises expand your lungs, aid in circulation, and help prevent pneumonia. Deep breathing exercises also help you breathe better and improve the function of your lungs by:  Keeping your lungs clear  Strengthening your breathing muscles  Helping prevent respiratory complications or problems  The incentive spirometer gives you a way to take an active part in recover. A nurse or therapist will teach you breathing exercises. To do these exercises, you will breathe in through your mouth and not your nose. The incentive spirometer only works correctly if you breathe in through your mouth.  Steps to clear lungs  Step 1. Exhale normally. Then, inhale normally.  Relax and breathe out.  Step 2. Place your lips tightly around the mouthpiece.  Make sure the device is upright and not tilted.  Step 3. Inhale as much air as you can through the mouthpiece (don't breath through your nose).  Inhale slowly and deeply.  Hold your breath long enough to keep the balls or disk raised for at least 3 to 5 seconds, or as instructed by your healthcare provider.  Some spirometers have an indicator to let you know that you are breathing in too fast. If the indicator goes off, breathe in more slowly.  Step 4. Repeat the exercise regularly.  Do this exercise every hour while you're awake, or as instructed by your healthcare provider.  If you were taught deep breathing and coughing exercises, do them regularly as instructed by your healthcare provider.           Post op instructions for prevention of DVT  What is deep vein thrombosis?  Deep vein thrombosis (DVT) is the medical term for blood clots in the deep veins of the leg.  These blood clots can be dangerous.  A DVT can block a blood vessel and keep  blood from getting where it needs to go.  Another problem is that the clot can travel to other parts of the body such as the lungs.  A clot that travels to the lungs is called a pulmonary embolus (PE) and can cause serious problems with breathing which can lead to death.  Am I at risk for DVT/PE?  If you are not very active, you are at risk of DVT.  Anyone confined to bed, sitting for long periods of time, recovering from surgery, etc. increases the risk of DVT.  Other risk factors are cancer diagnosis, certain medications, estrogen replacement in any form,older age, obesity, pregnancy, smoking, history of clotting disorders, and dehydration.  How will I know if I have a DVT?  Swelling in the lower leg  Pain  Warmth, redness, hardness or bulging of the vein  If you have any of these symptoms, call your doctors office right away.  Some people will not have any symptoms until the clot moves to the lungs.  What are the symptoms of a PE?  Panting, shortness of breath, or trouble breathing  Sharp, knife-like chest pain when you breathe  Coughing or coughing up blood  Rapid heartbeat  If you have any of these symptoms or get worse quickly, call 911 for emergency treatment.  How can I prevent a DVT?  Avoid long periods of inactivity and dont cross your legs--get up and walk around every hour or so.  Stay active--walking after surgery is highly encouraged.  This means you should get out of the house and walk in the neighborhood.  Going up and down stairs will not impair healing (unless advised against such activity by your doctor).    Drink plenty of noncaffeinated, nonalcoholic fluids each day to prevent dehydration.  Wear special support stockings, if they have been advised by your doctor.  If you travel, stop at least once an hour and walk around.  Avoid smoking (assistance with stopping is available through your healthcare provider)  Always notify your doctor if you are not able to follow the post operative  instructions that are given to you at the time of discharge.  It may be necessary to prescribe one of the medications available to prevent DVT.          We hope your stay was comfortable as you heal now, mend and rest.    For we have enjoyed taking care of you by giving your our best.    And as you get better, by regaining your health and strength;   We count it as a privilege to have served you and hope your time at [facility] was well spent.      Thank  You!!!

## 2023-12-04 NOTE — ANESTHESIA PROCEDURE NOTES
Spinal    Diagnosis: DJD hip left  Patient location during procedure: OR  Start time: 12/4/2023 10:19 AM  Timeout: 12/4/2023 10:18 AM  End time: 12/4/2023 10:23 AM    Staffing  Authorizing Provider: Don Fang MD  Performing Provider: Don Fang MD    Staffing  Performed by: Don Fang MD  Authorized by: Don Fang MD    Preanesthetic Checklist  Completed: patient identified, IV checked, site marked, risks and benefits discussed, surgical consent, monitors and equipment checked, pre-op evaluation and timeout performed  Spinal Block  Patient position: sitting  Prep: ChloraPrep  Patient monitoring: cardiac monitor, continuous pulse ox, continuous capnometry and frequent blood pressure checks  Approach: midline  Location: L3-4  Injection technique: single shot  CSF Fluid: clear free-flowing CSF  Needle  Needle type: pencil-tip   Needle gauge: 25 G  Needle length: 3.5 in  Additional Documentation: incremental injection, negative aspiration for heme and no paresthesia on injection  Needle localization: anatomical landmarks  Assessment  Sensory level: T7   Dermatomal levels determined by alcohol wipe  Ease of block: easy  Patient's tolerance of the procedure: comfortable throughout block  Additional Notes  Isobaric SAB  Medications:    Medications: bupivacaine (pf) (MARCAINE) injection 0.5% - Intraspinal   2 mL - 12/4/2023 10:23:00 AM

## 2023-12-04 NOTE — PLAN OF CARE
Patient was evaluated by OT and PT with clearance for discharge home post fluid infusion. Patient had experience low blood pressure during therapy and was place in recliner with legs elevated. She was monitored closely by therapy and nursing.  Per anesthesia Liter of isolyte given with increase of blood pressure pre and post therapy ambulation. Patient and daughter given handouts and instructions for a safe discharge home. Patient ate a sandwich and drank juice and water. All valuables were returned, and IV removed. Safely transferred to car with daughter.

## 2023-12-04 NOTE — TRANSFER OF CARE
"Anesthesia Transfer of Care Note    Patient: Olivia Mix    Procedure(s) Performed: Procedure(s) (LRB):  ARTHROPLASTY, HIP REPLACEMENT, LEFT (Left)    Patient location: PACU    Anesthesia Type: spinal    Transport from OR: Transported from OR on 2-3 L/min O2 by NC with adequate spontaneous ventilation    Post pain: adequate analgesia    Post assessment: no apparent anesthetic complications and tolerated procedure well    Post vital signs: stable    Level of consciousness: awake, alert and oriented    Nausea/Vomiting: no nausea/vomiting    Complications: none    Transfer of care protocol was followed      Last vitals: Visit Vitals  BP (!) 129/59 (BP Location: Right arm, Patient Position: Lying)   Pulse 77   Temp 36.6 °C (97.9 °F) (Temporal)   Resp 18   Ht 5' 6" (1.676 m)   Wt 63.5 kg (140 lb)   SpO2 100%   Breastfeeding No   BMI 22.60 kg/m²     "

## 2023-12-05 ENCOUNTER — OFFICE VISIT (OUTPATIENT)
Dept: ORTHOPEDICS | Facility: CLINIC | Age: 73
End: 2023-12-05
Payer: MEDICARE

## 2023-12-05 VITALS
HEIGHT: 66 IN | TEMPERATURE: 98 F | DIASTOLIC BLOOD PRESSURE: 58 MMHG | BODY MASS INDEX: 22.5 KG/M2 | HEART RATE: 80 BPM | RESPIRATION RATE: 17 BRPM | OXYGEN SATURATION: 100 % | WEIGHT: 140 LBS | SYSTOLIC BLOOD PRESSURE: 123 MMHG

## 2023-12-05 VITALS — WEIGHT: 140 LBS | BODY MASS INDEX: 22.5 KG/M2 | HEIGHT: 66 IN

## 2023-12-05 DIAGNOSIS — Z96.642 STATUS POST TOTAL HIP REPLACEMENT, LEFT: Primary | ICD-10-CM

## 2023-12-05 PROCEDURE — 1125F PR PAIN SEVERITY QUANTIFIED, PAIN PRESENT: ICD-10-PCS | Mod: CPTII,S$GLB,, | Performed by: ORTHOPAEDIC SURGERY

## 2023-12-05 PROCEDURE — 1159F PR MEDICATION LIST DOCUMENTED IN MEDICAL RECORD: ICD-10-PCS | Mod: CPTII,S$GLB,, | Performed by: ORTHOPAEDIC SURGERY

## 2023-12-05 PROCEDURE — 99024 PR POST-OP FOLLOW-UP VISIT: ICD-10-PCS | Mod: S$GLB,,, | Performed by: ORTHOPAEDIC SURGERY

## 2023-12-05 PROCEDURE — 1125F AMNT PAIN NOTED PAIN PRSNT: CPT | Mod: CPTII,S$GLB,, | Performed by: ORTHOPAEDIC SURGERY

## 2023-12-05 PROCEDURE — 1160F PR REVIEW ALL MEDS BY PRESCRIBER/CLIN PHARMACIST DOCUMENTED: ICD-10-PCS | Mod: CPTII,S$GLB,, | Performed by: ORTHOPAEDIC SURGERY

## 2023-12-05 PROCEDURE — 1101F PT FALLS ASSESS-DOCD LE1/YR: CPT | Mod: CPTII,S$GLB,, | Performed by: ORTHOPAEDIC SURGERY

## 2023-12-05 PROCEDURE — 1159F MED LIST DOCD IN RCRD: CPT | Mod: CPTII,S$GLB,, | Performed by: ORTHOPAEDIC SURGERY

## 2023-12-05 PROCEDURE — 99024 POSTOP FOLLOW-UP VISIT: CPT | Mod: S$GLB,,, | Performed by: ORTHOPAEDIC SURGERY

## 2023-12-05 PROCEDURE — 3288F PR FALLS RISK ASSESSMENT DOCUMENTED: ICD-10-PCS | Mod: CPTII,S$GLB,, | Performed by: ORTHOPAEDIC SURGERY

## 2023-12-05 PROCEDURE — G0180 MD CERTIFICATION HHA PATIENT: HCPCS | Mod: ,,, | Performed by: ORTHOPAEDIC SURGERY

## 2023-12-05 PROCEDURE — 1101F PR PT FALLS ASSESS DOC 0-1 FALLS W/OUT INJ PAST YR: ICD-10-PCS | Mod: CPTII,S$GLB,, | Performed by: ORTHOPAEDIC SURGERY

## 2023-12-05 PROCEDURE — 3288F FALL RISK ASSESSMENT DOCD: CPT | Mod: CPTII,S$GLB,, | Performed by: ORTHOPAEDIC SURGERY

## 2023-12-05 PROCEDURE — 1160F RVW MEDS BY RX/DR IN RCRD: CPT | Mod: CPTII,S$GLB,, | Performed by: ORTHOPAEDIC SURGERY

## 2023-12-05 NOTE — PROGRESS NOTES
[facility] ORTHOPEDICS POST-OP NOTE    Subjective:           Chief Complaint:   Chief Complaint   Patient presents with    Left Hip - Post-op Evaluation     POD 1 Left VELMA. States she is doing       Past Medical History:   Diagnosis Date    Colon polyp     Hypercholesterolemia     Thyroid disease        Past Surgical History:   Procedure Laterality Date    AUGMENTATION OF BREAST      breast augmentation      CHEILECTOMY      COLONOSCOPY      10 years ago    HYSTERECTOMY         Current Outpatient Medications   Medication Sig    aspirin (ECOTRIN) 81 MG EC tablet Take 1 tablet (81 mg total) by mouth every 12 (twelve) hours.    atorvastatin (LIPITOR) 20 MG tablet Take 1 tablet (20 mg total) by mouth once daily.    celecoxib (CELEBREX) 200 MG capsule Take 1 capsule (200 mg total) by mouth 2 (two) times daily.    co-enzyme Q-10 30 mg capsule Take 30 mg by mouth 3 (three) times a week.    docusate sodium (COLACE) 100 MG capsule Take 1 capsule (100 mg total) by mouth 2 (two) times daily.    gabapentin (NEURONTIN) 300 MG capsule Take 1 capsule (300 mg total) by mouth 2 (two) times daily.    levothyroxine (SYNTHROID) 100 MCG tablet Take one tab 6 days a week, take 1/2 tab one day a week    multivitamin (ONE DAILY MULTIVITAMIN) per tablet Take 1 tablet by mouth once daily.    oxyCODONE-acetaminophen (PERCOCET) 7.5-325 mg per tablet Take 1 tablet by mouth every 6 (six) hours as needed for Pain.     No current facility-administered medications for this visit.       Review of patient's allergies indicates:  No Known Allergies    Family History   Problem Relation Age of Onset    Breast cancer Mother 82    No Known Problems Daughter     Melanoma Neg Hx     Colon cancer Neg Hx     Colon polyps Neg Hx     Cystic fibrosis Neg Hx     Esophageal cancer Neg Hx     Liver cancer Neg Hx     Rectal cancer Neg Hx     Stomach cancer Neg Hx     Ulcerative colitis Neg Hx        Social History     Socioeconomic History    Marital status:     Tobacco Use    Smoking status: Never    Smokeless tobacco: Never   Substance and Sexual Activity    Alcohol use: Yes     Comment: social    Drug use: Not Currently     Social Determinants of Health     Stress: No Stress Concern Present (6/25/2020)    [employer] [place] [employer] - Occupational Stress Questionnaire     Feeling of Stress : Not at all       History of present illness:  Olivia comes in today postop day 1 left total hip arthroplasty.  She comes in today for wound check, dressing change and pain control assessment.  She is doing well.  She is weight-bearing as tolerated left lower extremity ambulating with a rolling walker.  Her pain is controlled.    Review of Systems:    Musculoskeletal:  See HPI      Objective:        Physical Examination:    Vital Signs:  There were no vitals filed for this visit.    Body mass index is 22.6 kg/m².    This a well-developed, well nourished patient in no acute distress.  They are alert and oriented and cooperative to examination.        Left hip exam:  Skin to left hip is clean dry and intact.  There is no erythema or ecchymosis.  There are no signs or symptoms of infection.  Left lateral hip incision is healing well without wound dehiscence or drainage.  Left calf is soft and nontender.  She is neurovascularly intact throughout the left lower extremity.  She can weightbear as tolerated left lower extremity.  She ambulates with a rolling walker.      Pertinent New Results:    XRAY Report / Interpretation:   No new radiographs taken on today's clinic visit.    Assessment/Plan:      1. Status post left total hip arthroplasty.      Dressing change left hip today.  She is scheduled to begin home health physical therapy.  Was very clear with her and discussing total hip replacement precautions to reduce the incidence of dislocation.  She will be taking aspirin 81 mg by mouth twice a day for 1 month postop for DVT prophylaxis.  We will see her back in 2  "weeks for wound check and suture removal.    Boyd White, Physician Assistant, served in the capacity as a "scribe" for this patient encounter.  A "face-to-face" encounter occurred with Dr. Boom Gunter on this date.  The treatment plan and medical decision-making is outlined above. Patient was seen and examined with a chaperone.     This note was created using Dragon voice recognition software that occasionally misinterpreted phrases or words.      "

## 2023-12-05 NOTE — PROGRESS NOTES
12/5/23 Very pleased with care given. States all staff were excellent.  States she has read and understands all discharge instructions.

## 2023-12-11 ENCOUNTER — TELEPHONE (OUTPATIENT)
Dept: ORTHOPEDICS | Facility: CLINIC | Age: 73
End: 2023-12-11

## 2023-12-11 NOTE — TELEPHONE ENCOUNTER
This patient is status post left total hip arthroplasty 12/04/2023.  She called the answering service on 12/10/2023 at 7:20 p.m. with complaints of left lower leg swelling from the knee to the foot.  I spoke with her directly.  She denied any calf pain.  I recommended that she go to the emergency room to be evaluated for a DVT and at that time she agreed.  When we contacted her this morning to check on her status, she stated that she decided not to go to the emergency room and that she is currently doing well and that the swelling is better.

## 2023-12-18 ENCOUNTER — OFFICE VISIT (OUTPATIENT)
Dept: ORTHOPEDICS | Facility: CLINIC | Age: 73
End: 2023-12-18
Payer: MEDICARE

## 2023-12-18 VITALS — HEIGHT: 66 IN | BODY MASS INDEX: 22.5 KG/M2 | WEIGHT: 140 LBS

## 2023-12-18 DIAGNOSIS — Z96.642 STATUS POST TOTAL HIP REPLACEMENT, LEFT: Primary | ICD-10-CM

## 2023-12-18 PROCEDURE — 1160F PR REVIEW ALL MEDS BY PRESCRIBER/CLIN PHARMACIST DOCUMENTED: ICD-10-PCS | Mod: CPTII,S$GLB,, | Performed by: PHYSICIAN ASSISTANT

## 2023-12-18 PROCEDURE — 99024 PR POST-OP FOLLOW-UP VISIT: ICD-10-PCS | Mod: S$GLB,,, | Performed by: PHYSICIAN ASSISTANT

## 2023-12-18 PROCEDURE — 3288F PR FALLS RISK ASSESSMENT DOCUMENTED: ICD-10-PCS | Mod: CPTII,S$GLB,, | Performed by: PHYSICIAN ASSISTANT

## 2023-12-18 PROCEDURE — 1159F PR MEDICATION LIST DOCUMENTED IN MEDICAL RECORD: ICD-10-PCS | Mod: CPTII,S$GLB,, | Performed by: PHYSICIAN ASSISTANT

## 2023-12-18 PROCEDURE — 1101F PR PT FALLS ASSESS DOC 0-1 FALLS W/OUT INJ PAST YR: ICD-10-PCS | Mod: CPTII,S$GLB,, | Performed by: PHYSICIAN ASSISTANT

## 2023-12-18 PROCEDURE — 3288F FALL RISK ASSESSMENT DOCD: CPT | Mod: CPTII,S$GLB,, | Performed by: PHYSICIAN ASSISTANT

## 2023-12-18 PROCEDURE — 1159F MED LIST DOCD IN RCRD: CPT | Mod: CPTII,S$GLB,, | Performed by: PHYSICIAN ASSISTANT

## 2023-12-18 PROCEDURE — 1160F RVW MEDS BY RX/DR IN RCRD: CPT | Mod: CPTII,S$GLB,, | Performed by: PHYSICIAN ASSISTANT

## 2023-12-18 PROCEDURE — 1101F PT FALLS ASSESS-DOCD LE1/YR: CPT | Mod: CPTII,S$GLB,, | Performed by: PHYSICIAN ASSISTANT

## 2023-12-18 PROCEDURE — 1125F PR PAIN SEVERITY QUANTIFIED, PAIN PRESENT: ICD-10-PCS | Mod: CPTII,S$GLB,, | Performed by: PHYSICIAN ASSISTANT

## 2023-12-18 PROCEDURE — 1125F AMNT PAIN NOTED PAIN PRSNT: CPT | Mod: CPTII,S$GLB,, | Performed by: PHYSICIAN ASSISTANT

## 2023-12-18 PROCEDURE — 99024 POSTOP FOLLOW-UP VISIT: CPT | Mod: S$GLB,,, | Performed by: PHYSICIAN ASSISTANT

## 2023-12-18 NOTE — PROGRESS NOTES
[facility] OR[facility]OPED[facility]  [address]  [place] [address]  Phone: [phone]   [phone]    Patient's PCP:Rubens Tinajero III, MD  Referring Provider: No ref. provider found    POST-OP Note:    Subjective:        Chief Complaint:   Chief Complaint   Patient presents with    Left Hip - Post-op Evaluation     PO Left VELMA 12/04/23. Here for suture removal. States she is doing very well.       Past Medical History:   Diagnosis Date    Colon polyp     Hypercholesterolemia     Thyroid disease        Past Surgical History:   Procedure Laterality Date    AUGMENTATION OF BREAST      breast augmentation      CHEILECTOMY      COLONOSCOPY      10 years ago    HIP REPLACEMENT ARTHROPLASTY Left 12/4/2023    Procedure: ARTHROPLASTY, HIP REPLACEMENT, LEFT;  Surgeon: Boom Gunter MD;  Location: [facility];  Service: Orthopedics;  Laterality: Left;  Scott    HYSTERECTOMY         Current Outpatient Medications   Medication Sig    aspirin (ECOTRIN) 81 MG EC tablet Take 1 tablet (81 mg total) by mouth every 12 (twelve) hours.    atorvastatin (LIPITOR) 20 MG tablet Take 1 tablet (20 mg total) by mouth once daily.    celecoxib (CELEBREX) 200 MG capsule Take 1 capsule (200 mg total) by mouth 2 (two) times daily.    co-enzyme Q-10 30 mg capsule Take 30 mg by mouth 3 (three) times a week.    docusate sodium (COLACE) 100 MG capsule Take 1 capsule (100 mg total) by mouth 2 (two) times daily.    gabapentin (NEURONTIN) 300 MG capsule Take 1 capsule (300 mg total) by mouth 2 (two) times daily.    levothyroxine (SYNTHROID) 100 MCG tablet Take one tab 6 days a week, take 1/2 tab one day a week    multivitamin (ONE DAILY MULTIVITAMIN) per tablet Take 1 tablet by mouth once daily.    oxyCODONE-acetaminophen (PERCOCET) 7.5-325 mg per tablet Take 1 tablet by mouth every 6 (six) hours as needed for Pain.     No current facility-administered medications for this visit.       Review of patient's allergies indicates:  No Known  Allergies    Family History   Problem Relation Age of Onset    Breast cancer Mother 82    No Known Problems Daughter     Melanoma Neg Hx     Colon cancer Neg Hx     Colon polyps Neg Hx     Cystic fibrosis Neg Hx     Esophageal cancer Neg Hx     Liver cancer Neg Hx     Rectal cancer Neg Hx     Stomach cancer Neg Hx     Ulcerative colitis Neg Hx        Social History     Socioeconomic History    Marital status:    Tobacco Use    Smoking status: Never    Smokeless tobacco: Never   Substance and Sexual Activity    Alcohol use: Yes     Comment: social    Drug use: Not Currently     Social Determinants of Health     Stress: No Stress Concern Present (6/25/2020)    [employer] - Occupational Stress Questionnaire     Feeling of Stress : Not at all       History of present illness:  Olivia comes in today 2 weeks status post left total hip arthroplasty.  Comes in today for wound check and suture removal.  She is doing quite well.  Her pain is controlled.  She is very pleased with her result thus far.  She is ambulating with a quad cane.    Review of Systems:    Musculoskeletal:  See HPI       Objective:        Physical Examination:    Vital Signs: There were no vitals filed for this visit.    Body mass index is 22.6 kg/m².    This a well-developed, well nourished patient in no acute distress.  They are alert and oriented and cooperative to examination.        Left hip exam:  Skin to the left hip is clean dry and intact.  No erythema or ecchymosis.  No signs or symptoms of infection.  Left lateral hip incision is healing well without wound dehiscence or drainage.  Left calf is soft and nontender.  She can weightbear as tolerated on the left lower extremity.  She ambulates with a quad cane.    Pertinent New Results:     XRAY Report / Interpretation:  No new radiographs taken on today's clinic visit.     Assessment:       1. Status post total hip replacement, left      Plan:     Status post  total hip replacement, left  -     Ambulatory referral/consult to Physical/Occupational Therapy; Future; Expected date: 12/25/2023        Follow up in about 4 weeks (around 1/15/2024) for //XR//, PO  PO Left VELMA 12/04/23.    Sutures removed from the left hip today.  She tolerated this well.  She was advised to clean the operative site once a day with warm soapy water not apply any topical creams or ointments.  Instructed to not submerge operative site underwater in a pool or bathtub type environment for least 1 more week.  Instructed to continue aspirin 81 mg by mouth twice a day for least 2 more weeks to provide a full month postoperative DVT prophylaxis coverage.  Given a prescription to begin outpatient physical therapy once home health therapy has commenced.  I did review with her total hip replacement precautions 1 more time to help reduce the incidence of dislocation.  I specifically discussed internal/external rotation and hip flexion.  We will see her back in 1 month to assess her progress with PT and obtain plain film radiographs of the left hip at that time.  She declined need for refill of pain medication today as she has good pain control with Tylenol.  She determine she does need a refill at some point in the future, I advised her to call the office and we would be glad to send one in electronically.      Boyd White, JESSICAS, PA-C    This note was created using Signal voice recognition software that occasionally misinterprets words or phrases.

## 2024-01-05 ENCOUNTER — EXTERNAL HOME HEALTH (OUTPATIENT)
Dept: HOME HEALTH SERVICES | Facility: HOSPITAL | Age: 74
End: 2024-01-05

## 2024-01-06 ENCOUNTER — TELEPHONE (OUTPATIENT)
Dept: ORTHOPEDICS | Facility: CLINIC | Age: 74
End: 2024-01-06

## 2024-01-06 DIAGNOSIS — T81.41XA INFECTION OF SUPERFICIAL INCISIONAL SURGICAL SITE AFTER PROCEDURE, INITIAL ENCOUNTER: Primary | ICD-10-CM

## 2024-01-06 RX ORDER — DOXYCYCLINE 100 MG/1
100 CAPSULE ORAL EVERY 12 HOURS
Qty: 20 CAPSULE | Refills: 1 | Status: SHIPPED | OUTPATIENT
Start: 2024-01-06

## 2024-01-06 NOTE — TELEPHONE ENCOUNTER
Post-op incision swelling and erythema. Doxycycline 100mg bid x 10 days.    F/U Dr. Gunter this Tuesday

## 2024-01-09 ENCOUNTER — OFFICE VISIT (OUTPATIENT)
Dept: ORTHOPEDICS | Facility: CLINIC | Age: 74
End: 2024-01-09
Payer: MEDICARE

## 2024-01-09 VITALS — HEIGHT: 66 IN | WEIGHT: 140 LBS | BODY MASS INDEX: 22.5 KG/M2

## 2024-01-09 DIAGNOSIS — Z96.642 STATUS POST TOTAL HIP REPLACEMENT, LEFT: Primary | ICD-10-CM

## 2024-01-09 PROCEDURE — 99024 POSTOP FOLLOW-UP VISIT: CPT | Mod: S$GLB,,, | Performed by: ORTHOPAEDIC SURGERY

## 2024-01-09 NOTE — PROGRESS NOTES
[facility] ORTHOPEDICS POST-OP NOTE    Subjective:           Chief Complaint:   Chief Complaint   Patient presents with    Left Hip - Post-op Evaluation     S/p L VELMA 12/4/23, here for wound check, she has been taking her abx and states its getting better, still going to PT       Past Medical History:   Diagnosis Date    Colon polyp     Hypercholesterolemia     Thyroid disease        Past Surgical History:   Procedure Laterality Date    AUGMENTATION OF BREAST      breast augmentation      CHEILECTOMY      COLONOSCOPY      10 years ago    HIP REPLACEMENT ARTHROPLASTY Left 12/4/2023    Procedure: ARTHROPLASTY, HIP REPLACEMENT, LEFT;  Surgeon: Boom Gunter MD;  Location: [facility];  Service: Orthopedics;  Laterality: Left;  Scott    HYSTERECTOMY         Current Outpatient Medications   Medication Sig    aspirin (ECOTRIN) 81 MG EC tablet Take 1 tablet (81 mg total) by mouth every 12 (twelve) hours.    atorvastatin (LIPITOR) 20 MG tablet Take 1 tablet (20 mg total) by mouth once daily.    co-enzyme Q-10 30 mg capsule Take 30 mg by mouth 3 (three) times a week.    doxycycline (VIBRAMYCIN) 100 MG Cap Take 1 capsule (100 mg total) by mouth every 12 (twelve) hours.    levothyroxine (SYNTHROID) 100 MCG tablet Take one tab 6 days a week, take 1/2 tab one day a week    multivitamin (ONE DAILY MULTIVITAMIN) per tablet Take 1 tablet by mouth once daily.    gabapentin (NEURONTIN) 300 MG capsule Take 1 capsule (300 mg total) by mouth 2 (two) times daily. (Patient not taking: Reported on 1/9/2024)    oxyCODONE-acetaminophen (PERCOCET) 7.5-325 mg per tablet Take 1 tablet by mouth every 6 (six) hours as needed for Pain. (Patient not taking: Reported on 1/9/2024)     No current facility-administered medications for this visit.       Review of patient's allergies indicates:  No Known Allergies    Family History   Problem Relation Age of Onset    Breast cancer Mother 82    No Known Problems Daughter     Melanoma Neg Hx     Colon cancer  Neg Hx     Colon polyps Neg Hx     Cystic fibrosis Neg Hx     Esophageal cancer Neg Hx     Liver cancer Neg Hx     Rectal cancer Neg Hx     Stomach cancer Neg Hx     Ulcerative colitis Neg Hx        Social History     Socioeconomic History    Marital status:    Tobacco Use    Smoking status: Never    Smokeless tobacco: Never   Substance and Sexual Activity    Alcohol use: Yes     Comment: social    Drug use: Not Currently     Social Determinants of Health     Stress: No Stress Concern Present (6/25/2020)    [employer] - Occupational Stress Questionnaire     Feeling of Stress : Not at all       History of present illness:  Patient returns status post total hip arthroplasty.  She is doing very well      Review of Systems:    Musculoskeletal:  See HPI      Objective:        Physical Examination:    Vital Signs:  There were no vitals filed for this visit.    Body mass index is 22.6 kg/m².    This a well-developed, well nourished patient in no acute distress.  They are alert and oriented and cooperative to examination.        Wounds clean dry and intact.  Neurovascularly intact the foot leg lengths are symmetric  Pertinent New Results:    XRAY Report / Interpretation:   AP pelvis demonstrates her total hip arthroplasty to be in ideal position    Assessment/Plan:      Stable following total hip arthroplasty.  Continue hip precautions.  Continue therapy.  Follow-up in 2 months    This note was created using Dragon voice recognition software that occasionally misinterpreted phrases or words.

## 2024-01-11 DIAGNOSIS — Z00.00 ENCOUNTER FOR MEDICARE ANNUAL WELLNESS EXAM: ICD-10-CM

## 2024-01-18 ENCOUNTER — DOCUMENT SCAN (OUTPATIENT)
Dept: HOME HEALTH SERVICES | Facility: HOSPITAL | Age: 74
End: 2024-01-18
Payer: MEDICARE

## 2024-01-29 ENCOUNTER — OFFICE VISIT (OUTPATIENT)
Dept: ORTHOPEDICS | Facility: CLINIC | Age: 74
End: 2024-01-29
Payer: MEDICARE

## 2024-01-29 VITALS — WEIGHT: 140 LBS | BODY MASS INDEX: 22.5 KG/M2 | HEIGHT: 66 IN

## 2024-01-29 DIAGNOSIS — M17.12 PRIMARY OSTEOARTHRITIS OF LEFT KNEE: Primary | ICD-10-CM

## 2024-01-29 DIAGNOSIS — M17.11 PRIMARY OSTEOARTHRITIS OF RIGHT KNEE: ICD-10-CM

## 2024-01-29 PROCEDURE — 99024 POSTOP FOLLOW-UP VISIT: CPT | Mod: 25,24,S$GLB, | Performed by: PHYSICIAN ASSISTANT

## 2024-01-29 PROCEDURE — 20610 DRAIN/INJ JOINT/BURSA W/O US: CPT | Mod: 50,79,S$GLB, | Performed by: PHYSICIAN ASSISTANT

## 2024-01-29 RX ORDER — TRIAMCINOLONE ACETONIDE 40 MG/ML
40 INJECTION, SUSPENSION INTRA-ARTICULAR; INTRAMUSCULAR
Status: DISCONTINUED | OUTPATIENT
Start: 2024-01-29 | End: 2024-01-29 | Stop reason: HOSPADM

## 2024-01-29 RX ADMIN — TRIAMCINOLONE ACETONIDE 40 MG: 40 INJECTION, SUSPENSION INTRA-ARTICULAR; INTRAMUSCULAR at 02:01

## 2024-01-29 NOTE — PROGRESS NOTES
[facility] ORTHOPEDICS  [address]  [place] [address]  Phone: [phone]   [phone]    Patient's PCP: Rubens Tinajero III, MD  Referring Provider: No ref. provider found    Subjective:      Chief Complaint:   Chief Complaint   Patient presents with    Left Knee - Pain     Bilateral knee pain, left knee worse than right, worse since her left VELMA done, right knee pops, left knee pain is worse, no giving way    Right Knee - Pain       Past Medical History:   Diagnosis Date    Colon polyp     Hypercholesterolemia     Thyroid disease        Past Surgical History:   Procedure Laterality Date    AUGMENTATION OF BREAST      breast augmentation      CHEILECTOMY      COLONOSCOPY      10 years ago    HIP REPLACEMENT ARTHROPLASTY Left 12/4/2023    Procedure: ARTHROPLASTY, HIP REPLACEMENT, LEFT;  Surgeon: Boom Gunter MD;  Location: [facility];  Service: Orthopedics;  Laterality: Left;  Scott    HYSTERECTOMY         Current Outpatient Medications   Medication Sig    atorvastatin (LIPITOR) 20 MG tablet Take 1 tablet (20 mg total) by mouth once daily.    co-enzyme Q-10 30 mg capsule Take 30 mg by mouth 3 (three) times a week.    doxycycline (VIBRAMYCIN) 100 MG Cap Take 1 capsule (100 mg total) by mouth every 12 (twelve) hours.    gabapentin (NEURONTIN) 300 MG capsule Take 1 capsule (300 mg total) by mouth 2 (two) times daily.    levothyroxine (SYNTHROID) 100 MCG tablet Take one tab 6 days a week, take 1/2 tab one day a week    multivitamin (ONE DAILY MULTIVITAMIN) per tablet Take 1 tablet by mouth once daily.    oxyCODONE-acetaminophen (PERCOCET) 7.5-325 mg per tablet Take 1 tablet by mouth every 6 (six) hours as needed for Pain.    aspirin (ECOTRIN) 81 MG EC tablet Take 1 tablet (81 mg total) by mouth every 12 (twelve) hours.     No current facility-administered medications for this visit.       Review of patient's allergies indicates:  No Known Allergies    Family History   Problem Relation Age of  Onset    Breast cancer Mother 82    No Known Problems Daughter     Melanoma Neg Hx     Colon cancer Neg Hx     Colon polyps Neg Hx     Cystic fibrosis Neg Hx     Esophageal cancer Neg Hx     Liver cancer Neg Hx     Rectal cancer Neg Hx     Stomach cancer Neg Hx     Ulcerative colitis Neg Hx        Social History     Socioeconomic History    Marital status:    Tobacco Use    Smoking status: Never    Smokeless tobacco: Never   Substance and Sexual Activity    Alcohol use: Yes     Comment: social    Drug use: Not Currently     Social Determinants of Health     Stress: No Stress Concern Present (6/25/2020)    [employer] [place] [employer] - Occupational Stress Questionnaire     Feeling of Stress : Not at all       History of present illness:  Ms. Baker comes in today for follow-up for her bilateral knees.  She was last seen and injected in her bilateral knees 9 months ago.  Those injections were efficacious providing relief for her.  She denies any new injury or trauma.  Unfortunately, she experiencing a recurrence of symptoms.  She reports her left knee is more symptomatic than the right.  She is beginning to have pain on a daily basis.  It is worse with weight-bearing activities.  It is interfering with some of her ADLs.  She is interested in repeat injections today.  She is about 6-7 weeks out from left total hip arthroplasty.  She is doing very well with that.  She does ambulate without an aid.    Review of Systems:    Constitutional: Negative for chills, fever and weight loss.   HENT: Negative for congestion.    Eyes: Negative for discharge and redness.   Respiratory: Negative for cough and shortness of breath.    Cardiovascular: Negative for chest pain.   Gastrointestinal: Negative for nausea and vomiting.   Musculoskeletal: See HPI.   Skin: Negative for rash.   Neurological: Negative for headaches.   Endo/Heme/Allergies: Does not bruise/bleed easily.   Psychiatric/Behavioral: The patient is not  nervous/anxious.    All other systems reviewed and are negative.       Objective:      Physical Examination:    Vital Signs:  There were no vitals filed for this visit.    Body mass index is 22.6 kg/m².    This a well-developed, well nourished patient in no acute distress.  They are alert and oriented and cooperative to examination.     Bilateral knee exam: Skin to bilateral knees is clean dry and intact.  There is no erythema or ecchymosis bilaterally.  No signs or symptoms of infection bilaterally.  She is neurovascularly intact throughout bilateral lower extremities.  Both calves soft and nontender.  Right knee range of motion 0-130 degrees.  Left knee range of motion 0-120 degrees.  Both knees stable to varus and valgus stresses.  She does have a valgus deformity more so on the right versus the left.  She does have diffuse crepitus with range of motioning more so on the right versus the left knee.  She can weightbear as tolerated on bilateral lower extremities.  Both knees are diffusely tender both medially and laterally.    Pertinent New Results:        XRAY Report / Interpretation:   Three views taken of the bilateral knees today: AP, lateral, and sunrise views.  They reveal no acute fractures or dislocations bilaterally.  Visualized soft tissues appear unremarkable bilaterally.  Patient has moderate-severe arthrosis tricompartmentally in her bilateral knees with osteophyte development bilaterally.      Assessment:       1. Primary osteoarthritis of left knee    2. Primary osteoarthritis of right knee      Plan:     Primary osteoarthritis of left knee  -     X-Ray Knee 3 View Bilateral  -     Large Joint Aspiration/Injection: L knee    Primary osteoarthritis of right knee  -     X-Ray Knee 3 View Bilateral  -     Large Joint Aspiration/Injection: R knee        Follow up if symptoms worsen or fail to improve.    I injected her bilateral knees today via an anterolateral approach with 40 mg of Kenalog and  lidocaine respectively.  She tolerated these well.  She would like to schedule her own follow-up appointment whenever she does have a recurrence of symptoms.  She normally gets 5-6 months relief she states.  Ultimately, she will likely need total knee arthroplasties at some point in the future to address the underlying osteoarthritis in her knees.        Boyd White, JESSICAS, PA-C    This note was created using [employer] voice recognition software that occasionally misinterprets words or phrases.

## 2024-01-29 NOTE — PROCEDURES
Large Joint Aspiration/Injection: R knee    Date/Time: 1/29/2024 2:00 PM    Performed by: Boyd White PA-C  Authorized by: Boyd White PA-C    Consent Done?:  Yes (Verbal)  Indications:  Arthritis and pain  Site marked: the procedure site was marked    Timeout: prior to procedure the correct patient, procedure, and site was verified    Prep: patient was prepped and draped in usual sterile fashion      Local anesthesia used?: Yes    Local anesthetic:  Lidocaine 1% without epinephrine    Details:  Needle Size:  25 G  Ultrasonic Guidance for needle placement?: No    Location:  Knee  Site:  R knee  Medications:  40 mg triamcinolone acetonide 40 mg/mL  Patient tolerance:  Patient tolerated the procedure well with no immediate complications  Large Joint Aspiration/Injection: L knee    Date/Time: 1/29/2024 2:00 PM    Performed by: Boyd White PA-C  Authorized by: Boyd White PA-C    Consent Done?:  Yes (Verbal)  Indications:  Arthritis and pain  Site marked: the procedure site was marked    Timeout: prior to procedure the correct patient, procedure, and site was verified    Prep: patient was prepped and draped in usual sterile fashion      Local anesthesia used?: Yes    Local anesthetic:  Lidocaine 1% without epinephrine    Details:  Needle Size:  25 G  Ultrasonic Guidance for needle placement?: No    Location:  Knee  Site:  L knee  Medications:  40 mg triamcinolone acetonide 40 mg/mL  Patient tolerance:  Patient tolerated the procedure well with no immediate complications

## 2024-03-12 ENCOUNTER — OFFICE VISIT (OUTPATIENT)
Dept: ORTHOPEDICS | Facility: CLINIC | Age: 74
End: 2024-03-12
Payer: MEDICARE

## 2024-03-12 VITALS — BODY MASS INDEX: 22.5 KG/M2 | HEIGHT: 66 IN | WEIGHT: 140 LBS

## 2024-03-12 DIAGNOSIS — Z96.642 HISTORY OF TOTAL LEFT HIP REPLACEMENT: Primary | ICD-10-CM

## 2024-03-12 PROCEDURE — 99213 OFFICE O/P EST LOW 20 MIN: CPT | Mod: S$GLB,,, | Performed by: ORTHOPAEDIC SURGERY

## 2024-03-12 RX ORDER — MELOXICAM 15 MG/1
15 TABLET ORAL DAILY
Qty: 30 TABLET | Refills: 2 | Status: SHIPPED | OUTPATIENT
Start: 2024-03-12 | End: 2024-06-10

## 2024-03-12 NOTE — PROGRESS NOTES
[facility] ORTHOPEDICS    Subjective:     Chief Complaint:   Chief Complaint   Patient presents with    Left Hip - Pain     Patient is here for a f/up on Left VELMA 12.4.23, states her pain comes and goes, wants Rx for Meloxicam.        Past Medical History:   Diagnosis Date    Colon polyp     Hypercholesterolemia     Thyroid disease        Past Surgical History:   Procedure Laterality Date    AUGMENTATION OF BREAST      breast augmentation      CHEILECTOMY      COLONOSCOPY      10 years ago    HIP REPLACEMENT ARTHROPLASTY Left 12/4/2023    Procedure: ARTHROPLASTY, HIP REPLACEMENT, LEFT;  Surgeon: Boom Gunter MD;  Location: [facility];  Service: Orthopedics;  Laterality: Left;  Scott    HYSTERECTOMY         Current Outpatient Medications   Medication Sig    atorvastatin (LIPITOR) 20 MG tablet Take 1 tablet (20 mg total) by mouth once daily.    co-enzyme Q-10 30 mg capsule Take 30 mg by mouth 3 (three) times a week.    levothyroxine (SYNTHROID) 100 MCG tablet Take one tab 6 days a week, take 1/2 tab one day a week    multivitamin (ONE DAILY MULTIVITAMIN) per tablet Take 1 tablet by mouth once daily.    aspirin (ECOTRIN) 81 MG EC tablet Take 1 tablet (81 mg total) by mouth every 12 (twelve) hours. (Patient not taking: Reported on 3/12/2024)    doxycycline (VIBRAMYCIN) 100 MG Cap Take 1 capsule (100 mg total) by mouth every 12 (twelve) hours. (Patient not taking: Reported on 3/12/2024)    gabapentin (NEURONTIN) 300 MG capsule Take 1 capsule (300 mg total) by mouth 2 (two) times daily. (Patient not taking: Reported on 3/12/2024)    oxyCODONE-acetaminophen (PERCOCET) 7.5-325 mg per tablet Take 1 tablet by mouth every 6 (six) hours as needed for Pain. (Patient not taking: Reported on 3/12/2024)     No current facility-administered medications for this visit.       Review of patient's allergies indicates:  No Known Allergies    Family History   Problem Relation Age of Onset    Breast cancer Mother 82    No Known Problems  Daughter     Melanoma Neg Hx     Colon cancer Neg Hx     Colon polyps Neg Hx     Cystic fibrosis Neg Hx     Esophageal cancer Neg Hx     Liver cancer Neg Hx     Rectal cancer Neg Hx     Stomach cancer Neg Hx     Ulcerative colitis Neg Hx        Social History     Socioeconomic History    Marital status:    Tobacco Use    Smoking status: Never    Smokeless tobacco: Never   Substance and Sexual Activity    Alcohol use: Yes     Comment: social    Drug use: Not Currently     Social Determinants of Health     Stress: No Stress Concern Present (6/25/2020)    [employer] [employer] - Occupational Stress Questionnaire     Feeling of Stress : Not at all       History of present illness:  74-year-old female, returns to clinic today for follow-up of the left hip.  She had a left total hip arthroplasty in December, now greater than 90 days postop.  This was primarily for osteoarthritis.  She is doing well, she is interested in returning or resuming her normal activities.      Review of Systems:    Constitution: Negative for chills, fever, and sweats.  Negative for unexplained weight loss.    HENT:  Negative for headaches and blurry vision.    Cardiovascular:Negative for chest pain or irregular heart beat. Negative for hypertension.    Respiratory:  Negative for cough and shortness of breath.    Gastrointestinal: Negative for abdominal pain, heartburn, melena, nausea, and vomitting.    Genitourinary:  Negative bladder incontinence and dysuria.    Musculoskeletal:  See HPI for details.     Neurological: Negative for numbness.    Psychiatric/Behavioral: Negative for depression.  The patient is not nervous/anxious.      Endocrine: Negative for polyuria    Hematologic/Lymphatic: Negative for bleeding problem.  Does not bruise/bleed easily.    Skin: Negative for poor would healing and rash    Objective:      Physical Examination:    Vital Signs:  There were no vitals filed for this visit.    Body mass index  "is 22.6 kg/m².    This a well-developed, well nourished patient in no acute distress.  They are alert and oriented and cooperative to examination.        Examination of the left hip, no significant complaints of groin pain or discomfort with gentle range of motion to include flexion extension, abduction adduction, or internal external rotation.  Pertinent New Results:    XRAY Report / Interpretation:   AP left hip taken today in the office demonstrate a left total hip arthroplasty, she does have 3 cerclage wires noted.  No evidence of hardware failure or loosening or subsidence.  Visualized soft tissues appear normal.    Assessment/Plan:      Stable status post left total hip arthroplasty, patient is doing well in terms of hip pain.  We released her from her hip precautions however we cautioned her to take it slow she would like to do yoga, really cautioned her on deep bending or flexion at the hip as to allow more healing of the soft tissues and the supportive structures around the hip.  We do not want her to have a hip dislocation.  She understands.  She requested meloxicam, a family member, her son takes this medication she states that it helps for her aches.  No contraindications so we gave her meloxicam 15 mg.  She will follow-up with us on an as-needed basis.    Ladarius Daniel, Physician Assistant, served in the capacity as a "scribe" for this patient encounter.  A "face-to-face" encounter occurred with Dr. Boom Gunter on this date.  The treatment plan and medical decision-making is outlined above. Patient was seen and examined with a chaperone.       This note was created using Dragon voice recognition software that occasionally misinterpreted phrases or words.          "

## 2024-03-28 ENCOUNTER — OFFICE VISIT (OUTPATIENT)
Dept: FAMILY MEDICINE | Facility: CLINIC | Age: 74
End: 2024-03-28
Payer: MEDICARE

## 2024-03-28 VITALS
TEMPERATURE: 98 F | HEART RATE: 70 BPM | WEIGHT: 136.69 LBS | DIASTOLIC BLOOD PRESSURE: 57 MMHG | SYSTOLIC BLOOD PRESSURE: 117 MMHG | BODY MASS INDEX: 22.06 KG/M2 | OXYGEN SATURATION: 99 %

## 2024-03-28 DIAGNOSIS — Z96.642 STATUS POST LEFT HIP REPLACEMENT: ICD-10-CM

## 2024-03-28 DIAGNOSIS — E78.5 HYPERLIPIDEMIA, UNSPECIFIED HYPERLIPIDEMIA TYPE: Primary | ICD-10-CM

## 2024-03-28 DIAGNOSIS — E03.9 HYPOTHYROIDISM, UNSPECIFIED TYPE: ICD-10-CM

## 2024-03-28 DIAGNOSIS — H26.9 CATARACT, UNSPECIFIED CATARACT TYPE, UNSPECIFIED LATERALITY: ICD-10-CM

## 2024-03-28 DIAGNOSIS — Z79.82 ASPIRIN LONG-TERM USE: ICD-10-CM

## 2024-03-28 DIAGNOSIS — D69.2 OTHER NONTHROMBOCYTOPENIC PURPURA: ICD-10-CM

## 2024-03-28 PROCEDURE — 99214 OFFICE O/P EST MOD 30 MIN: CPT | Mod: S$GLB,,, | Performed by: FAMILY MEDICINE

## 2024-03-28 PROCEDURE — 99999 PR PBB SHADOW E&M-EST. PATIENT-LVL III: CPT | Mod: PBBFAC,,, | Performed by: FAMILY MEDICINE

## 2024-03-30 PROBLEM — Z96.642 STATUS POST LEFT HIP REPLACEMENT: Status: ACTIVE | Noted: 2024-03-30

## 2024-03-31 NOTE — PROGRESS NOTES
Subjective:       Patient ID: Olivia Mix is a 74 y.o. female.    Chief Complaint: Pre-op Exam    Needs clearance for cataract surgery right eye.  Being done 04/10/2024.  Left done previously 3 years ago.  Dr. Smith.  Exercises around 14 times per week and ex status post left hip replacement December of 2023 Dr. Gunter doing well exercising again.  Hyperlipidemia.  Using aspirin 81 daily.  BMI of 22.  Mammogram done in August was okay.  Cardiovascular no chest pain or palpitations pulmonary no cough or sputum.  Hematologic no bleeding or bruising.    Hypothyroidism check of this is due.  Some bruising on forearms.      Physical examination.  Vital signs noted.  No acute distress.  Neck without bruit.  Chest clear.  Heart regular rate and rhythm.  Abdomen bowel sounds positive soft nontender.  Extremities without edema positive pedal pulses.  Some senile purpuric on the forearms.          Objective:        Assessment:       1. Hyperlipidemia, unspecified hyperlipidemia type    2. Hypothyroidism, unspecified type    3. BMI 22.0-22.9, adult    4. Cataract, unspecified cataract type, unspecified laterality    5. Status post left hip replacement    6. Aspirin long-term use    7. Other nonthrombocytopenic purpura        Plan:       Hyperlipidemia, unspecified hyperlipidemia type  -     CBC Auto Differential; Future; Expected date: 03/28/2024  -     Comprehensive Metabolic Panel; Future; Expected date: 03/28/2024  -     TSH; Future; Expected date: 03/28/2024  -     Lipid Panel; Future; Expected date: 03/28/2024    Hypothyroidism, unspecified type  -     CBC Auto Differential; Future; Expected date: 03/28/2024  -     Comprehensive Metabolic Panel; Future; Expected date: 03/28/2024  -     TSH; Future; Expected date: 03/28/2024  -     Lipid Panel; Future; Expected date: 03/28/2024    BMI 22.0-22.9, adult    Cataract, unspecified cataract type, unspecified laterality    Status post left hip replacement    Aspirin  long-term use    Other nonthrombocytopenic purpura      CBC CMP TSH lipids ordered.  Low risk for planned surgical procedure.  Papers filled out.  Six-month follow-up.  Hold aspirin for surgery.  Continue exercise program.  No treatment needed for the senile purpuric.

## 2024-04-04 LAB
ALBUMIN SERPL-MCNC: 4.1 G/DL (ref 3.6–5.1)
ALBUMIN/GLOB SERPL: 1.6 (CALC) (ref 1–2.5)
ALP SERPL-CCNC: 59 U/L (ref 37–153)
ALT SERPL-CCNC: 21 U/L (ref 6–29)
AST SERPL-CCNC: 23 U/L (ref 10–35)
BASOPHILS # BLD AUTO: 34 CELLS/UL (ref 0–200)
BASOPHILS NFR BLD AUTO: 0.4 %
BILIRUB SERPL-MCNC: 0.3 MG/DL (ref 0.2–1.2)
BUN SERPL-MCNC: 15 MG/DL (ref 7–25)
BUN/CREAT SERPL: NORMAL (CALC) (ref 6–22)
CALCIUM SERPL-MCNC: 9.5 MG/DL (ref 8.6–10.4)
CHLORIDE SERPL-SCNC: 105 MMOL/L (ref 98–110)
CHOLEST SERPL-MCNC: 180 MG/DL
CHOLEST/HDLC SERPL: 2.4 (CALC)
CO2 SERPL-SCNC: 29 MMOL/L (ref 20–32)
CREAT SERPL-MCNC: 0.73 MG/DL (ref 0.6–1)
EGFR: 85 ML/MIN/1.73M2
EOSINOPHIL # BLD AUTO: 168 CELLS/UL (ref 15–500)
EOSINOPHIL NFR BLD AUTO: 2 %
ERYTHROCYTE [DISTWIDTH] IN BLOOD BY AUTOMATED COUNT: 15.3 % (ref 11–15)
GLOBULIN SER CALC-MCNC: 2.6 G/DL (CALC) (ref 1.9–3.7)
GLUCOSE SERPL-MCNC: 88 MG/DL (ref 65–99)
HCT VFR BLD AUTO: 40.7 % (ref 35–45)
HDLC SERPL-MCNC: 75 MG/DL
HGB BLD-MCNC: 13.1 G/DL (ref 11.7–15.5)
LDLC SERPL CALC-MCNC: 87 MG/DL (CALC)
LYMPHOCYTES # BLD AUTO: 2041.2 CELLS/UL (ref 850–3900)
LYMPHOCYTES NFR BLD AUTO: 24.3 %
MCH RBC QN AUTO: 26.6 PG (ref 27–33)
MCHC RBC AUTO-ENTMCNC: 32.2 G/DL (ref 32–36)
MCV RBC AUTO: 82.7 FL (ref 80–100)
MONOCYTES # BLD AUTO: 546 CELLS/UL (ref 200–950)
MONOCYTES NFR BLD AUTO: 6.5 %
NEUTROPHILS # BLD AUTO: 5611 CELLS/UL (ref 1500–7800)
NEUTROPHILS NFR BLD AUTO: 66.8 %
NONHDLC SERPL-MCNC: 105 MG/DL (CALC)
PLATELET # BLD AUTO: 283 THOUSAND/UL (ref 140–400)
PMV BLD REES-ECKER: 12.3 FL (ref 7.5–12.5)
POTASSIUM SERPL-SCNC: 4.3 MMOL/L (ref 3.5–5.3)
PROT SERPL-MCNC: 6.7 G/DL (ref 6.1–8.1)
RBC # BLD AUTO: 4.92 MILLION/UL (ref 3.8–5.1)
SODIUM SERPL-SCNC: 143 MMOL/L (ref 135–146)
TRIGL SERPL-MCNC: 89 MG/DL
TSH SERPL-ACNC: 0.79 MIU/L (ref 0.4–4.5)
WBC # BLD AUTO: 8.4 THOUSAND/UL (ref 3.8–10.8)

## 2024-04-23 ENCOUNTER — OFFICE VISIT (OUTPATIENT)
Dept: DERMATOLOGY | Facility: CLINIC | Age: 74
End: 2024-04-23
Payer: MEDICARE

## 2024-04-23 DIAGNOSIS — L82.1 SEBORRHEIC KERATOSIS: ICD-10-CM

## 2024-04-23 DIAGNOSIS — L57.0 ACTINIC KERATOSIS: ICD-10-CM

## 2024-04-23 DIAGNOSIS — L57.8 ACTINIC SKIN DAMAGE: ICD-10-CM

## 2024-04-23 DIAGNOSIS — L81.4 LENTIGO: ICD-10-CM

## 2024-04-23 DIAGNOSIS — L73.8 SEBACEOUS HYPERPLASIA: ICD-10-CM

## 2024-04-23 DIAGNOSIS — D18.01 CHERRY ANGIOMA: ICD-10-CM

## 2024-04-23 DIAGNOSIS — D22.9 MULTIPLE BENIGN NEVI: ICD-10-CM

## 2024-04-23 DIAGNOSIS — D48.5 NEOPLASM OF UNCERTAIN BEHAVIOR OF SKIN: Primary | ICD-10-CM

## 2024-04-23 PROCEDURE — 1159F MED LIST DOCD IN RCRD: CPT | Mod: CPTII,S$GLB,, | Performed by: STUDENT IN AN ORGANIZED HEALTH CARE EDUCATION/TRAINING PROGRAM

## 2024-04-23 PROCEDURE — 1160F RVW MEDS BY RX/DR IN RCRD: CPT | Mod: CPTII,S$GLB,, | Performed by: STUDENT IN AN ORGANIZED HEALTH CARE EDUCATION/TRAINING PROGRAM

## 2024-04-23 PROCEDURE — 17000 DESTRUCT PREMALG LESION: CPT | Mod: XS,S$GLB,, | Performed by: STUDENT IN AN ORGANIZED HEALTH CARE EDUCATION/TRAINING PROGRAM

## 2024-04-23 PROCEDURE — 88305 TISSUE EXAM BY PATHOLOGIST: CPT | Performed by: DERMATOLOGY

## 2024-04-23 PROCEDURE — 11102 TANGNTL BX SKIN SINGLE LES: CPT | Mod: S$GLB,,, | Performed by: STUDENT IN AN ORGANIZED HEALTH CARE EDUCATION/TRAINING PROGRAM

## 2024-04-23 PROCEDURE — 88305 TISSUE EXAM BY PATHOLOGIST: CPT | Mod: 26,,, | Performed by: DERMATOLOGY

## 2024-04-23 PROCEDURE — 1126F AMNT PAIN NOTED NONE PRSNT: CPT | Mod: CPTII,S$GLB,, | Performed by: STUDENT IN AN ORGANIZED HEALTH CARE EDUCATION/TRAINING PROGRAM

## 2024-04-23 PROCEDURE — 1101F PT FALLS ASSESS-DOCD LE1/YR: CPT | Mod: CPTII,S$GLB,, | Performed by: STUDENT IN AN ORGANIZED HEALTH CARE EDUCATION/TRAINING PROGRAM

## 2024-04-23 PROCEDURE — 99213 OFFICE O/P EST LOW 20 MIN: CPT | Mod: 25,S$GLB,, | Performed by: STUDENT IN AN ORGANIZED HEALTH CARE EDUCATION/TRAINING PROGRAM

## 2024-04-23 PROCEDURE — 3288F FALL RISK ASSESSMENT DOCD: CPT | Mod: CPTII,S$GLB,, | Performed by: STUDENT IN AN ORGANIZED HEALTH CARE EDUCATION/TRAINING PROGRAM

## 2024-04-23 NOTE — PROGRESS NOTES
Subjective:      Patient ID:  Olivia Mix is a 74 y.o. female who presents for   Chief Complaint   Patient presents with    Spot     Multiple      LOV 6/28/21 Singh    Patient here today for skin check TBSE  Complains of multiple spots, none are bothersome.    Denies Phx of NMSC  Denies Fhx of MM        Review of Systems   Constitutional:  Negative for fever, chills and fatigue.   Respiratory:  Negative for cough and shortness of breath.    Gastrointestinal:  Negative for nausea and vomiting.   Skin:  Positive for activity-related sunscreen use. Negative for daily sunscreen use and wears hat.   Hematologic/Lymphatic: Bruises/bleeds easily (asa).       Objective:   Physical Exam   Constitutional: She appears well-developed and well-nourished. No distress.   Neurological: She is alert and oriented to person, place, and time. She is not disoriented.   Psychiatric: She has a normal mood and affect.   Skin:   Areas Examined (abnormalities noted in diagram):   Scalp / Hair Palpated and Inspected  Head / Face Inspection Performed  Neck Inspection Performed  Chest / Axilla Inspection Performed  Abdomen Inspection Performed  Genitals / Buttocks / Groin Inspection Performed  Back Inspection Performed  RUE Inspected  LUE Inspection Performed  RLE Inspected  LLE Inspection Performed  Nails and Digits Inspection Performed                     Diagram Legend     Erythematous scaling macule/papule c/w actinic keratosis       Vascular papule c/w angioma      Pigmented verrucoid papule/plaque c/w seborrheic keratosis      Yellow umbilicated papule c/w sebaceous hyperplasia      Irregularly shaped tan macule c/w lentigo     1-2 mm smooth white papules consistent with Milia      Movable subcutaneous cyst with punctum c/w epidermal inclusion cyst      Subcutaneous movable cyst c/w pilar cyst      Firm pink to brown papule c/w dermatofibroma      Pedunculated fleshy papule(s) c/w skin tag(s)      Evenly pigmented macule c/w  junctional nevus     Mildly variegated pigmented, slightly irregular-bordered macule c/w mildly atypical nevus      Flesh colored to evenly pigmented papule c/w intradermal nevus       Pink pearly papule/plaque c/w basal cell carcinoma      Erythematous hyperkeratotic cursted plaque c/w SCC      Surgical scar with no sign of skin cancer recurrence      Open and closed comedones      Inflammatory papules and pustules      Verrucoid papule consistent consistent with wart     Erythematous eczematous patches and plaques     Dystrophic onycholytic nail with subungual debris c/w onychomycosis     Umbilicated papule    Erythematous-base heme-crusted tan verrucoid plaque consistent with inflamed seborrheic keratosis     Erythematous Silvery Scaling Plaque c/w Psoriasis     See annotation      Assessment / Plan:      Pathology Orders:       Normal Orders This Visit    Specimen to Pathology, Dermatology     Questions:    Procedure Type: Dermatology and skin neoplasms    Number of Specimens: 1    ------------------------: -------------------------    Spec 1 Procedure: Biopsy    Spec 1 Clinical Impression: BCC vs. BLK vs. other    Spec 1 Source: right shoulder    Release to patient:           Neoplasm of uncertain behavior of skin  -     Specimen to Pathology, Dermatology  Shave biopsy procedure note:    Shave biopsy performed after verbal consent including risk of infection, scar, recurrence, need for additional treatment of site. Area prepped with alcohol, anesthetized with approximately 1.0cc of 1% lidocaine with epinephrine. Lesional tissue shaved with razor blade. Hemostasis achieved with application of aluminum chloride followed by hyfrecation. No complications. Dressing applied. Wound care explained.    Actinic keratosis  Cryosurgery Procedure Note    Verbal consent from the patient is obtained and the patient is aware of the precancerous quality and need for treatment of these lesions. Liquid nitrogen cryosurgery is  applied to the 1 actinic keratoses, as detailed in the physical exam, to produce a freeze injury. The patient is aware that blisters may form and is instructed on wound care with gentle cleansing and use of vaseline ointment to keep moist until healed. The patient is supplied a handout on cryosurgery and is instructed to call if lesions do not completely resolve.    Seborrheic keratosis  These are benign inherited growths without a malignant potential. Reassurance given to patient. No treatment is necessary.     Actinic skin damage  Total body skin examination performed today including at least 12 points as noted in physical examination. Suspicious lesions noted.  Patient instructed in importance in daily broad spectrum sun protection of at least spf 30. Mineral sunscreen ingredients preferred (Zinc +/- Titanium) and can be found OTC.   Patient encouraged to wear hat for all outdoor exposure.   Also discussed sun avoidance and use of protective clothing.    Multiple benign nevi  Careful dermoscopy evaluation of nevi performed   Monitor for new mole or moles that are becoming bigger, darker, irritated, or developing irregular borders.     Sebaceous hyperplasia  This is a common condition representing benign enlargement of the sebaceous lobule. It typically occurs in adulthood. Reassurance given to patient.     Lentigo  This is a benign hyperpigmented sun induced lesion. Daily sun protection will reduce the number of new lesions. Treatment of these benign lesions are considered cosmetic.    Cherry angioma  This is a benign vascular lesion. Reassurance given. No treatment required.          F/u pending biopsy    No follow-ups on file.

## 2024-04-23 NOTE — PATIENT INSTRUCTIONS
Shave Biopsy Wound Care    Your doctor has performed a shave biopsy today.  A band aid and vaseline ointment has been placed over the site.  This should remain in place for 24 hours.  It is recommended that you keep the area dry for the first 24 hours.  After 24 hours, you may remove the band aid and wash the area with warm soap and water and apply Vaseline jelly.  Many patients prefer to use Neosporin or Bacitracin ointment.  This is acceptable; however, know that you can develop an allergy to this medication even if you have used it safely for years.  It is important to keep the area moist.  Letting it dry out and get air slows healing time, and will worsen the scar.  Band aid is optional after first 24 hours.      If you notice increasing redness, tenderness, pain, or yellow drainage at the biopsy site, please notify your doctor.  These are signs of an infection.    If your biopsy site is bleeding, apply firm pressure for 15 minutes straight.  Repeat for another 15 minutes, if it is still bleeding.   If the surgical site continues to bleed, then please contact your doctor.      [facility] [place] [address]/ [phone] FAX/ [email or web address]     CRYOSURGERY      Your doctor has used a method called cryosurgery to treat your skin condition. Cryosurgery refers to the use of very cold substances to treat a variety of skin conditions such as warts, pre-skin cancers, molluscum contagiosum, sun spots, and several benign growths. The substance we use in cryosurgery is liquid nitrogen and is so cold (-195 degrees Celsius) that is burns when administered.     Following treatment in the office, the skin may immediately burn and become red. You may find the area around the lesion is affected as well. It is sometimes necessary to treat not only the lesion, but a small area of the surrounding normal skin to achieve a good response.     A blister, and even a blood filled blister, may form  after treatment.   This is a normal response. If the blister is painful, it is acceptable to sterilize a needle and with rubbing alcohol and gently pop the blister. It is important that you gently wash the area with soap and warm water as the blister fluid may contain wart virus if a wart was treated. Do no remove the roof of the blister.     The area treated can take anywhere from 1-3 weeks to heal. Healing time depends on the kind skin lesion treated, the location, and how aggressively the lesion was treated. It is recommended that the areas treated are covered with Vaseline or bacitracin ointment and a band-aid. If a band-aid is not practical, just ointment applied several times per day will do. Keeping these areas moist will speed the healing time.    Treatment with liquid nitrogen can leave a scar. In dark skin, it may be a light or dark scar, in light skin it may be a white or pink scar. These will generally fade with time, but may never go away completely.     If you have any concerns after your treatment, please feel free to call the office.       [facility] [place] [address]/ [phone] FAX/ [email or web address]

## 2024-04-29 LAB
FINAL PATHOLOGIC DIAGNOSIS: NORMAL
GROSS: NORMAL
Lab: NORMAL
MICROSCOPIC EXAM: NORMAL

## 2024-06-03 ENCOUNTER — OFFICE VISIT (OUTPATIENT)
Dept: FAMILY MEDICINE | Facility: CLINIC | Age: 74
End: 2024-06-03
Payer: MEDICARE

## 2024-06-03 VITALS
DIASTOLIC BLOOD PRESSURE: 68 MMHG | BODY MASS INDEX: 22.27 KG/M2 | TEMPERATURE: 98 F | WEIGHT: 138 LBS | OXYGEN SATURATION: 97 % | SYSTOLIC BLOOD PRESSURE: 110 MMHG | HEART RATE: 78 BPM

## 2024-06-03 DIAGNOSIS — E78.5 HYPERLIPIDEMIA, UNSPECIFIED HYPERLIPIDEMIA TYPE: ICD-10-CM

## 2024-06-03 DIAGNOSIS — E03.9 HYPOTHYROIDISM, UNSPECIFIED TYPE: ICD-10-CM

## 2024-06-03 DIAGNOSIS — H25.9 SENILE CATARACT OF RIGHT EYE, UNSPECIFIED AGE-RELATED CATARACT TYPE: ICD-10-CM

## 2024-06-03 DIAGNOSIS — Z96.642 STATUS POST LEFT HIP REPLACEMENT: ICD-10-CM

## 2024-06-03 DIAGNOSIS — Z79.82 ASPIRIN LONG-TERM USE: Primary | ICD-10-CM

## 2024-06-03 PROCEDURE — 99214 OFFICE O/P EST MOD 30 MIN: CPT | Mod: S$GLB,,, | Performed by: FAMILY MEDICINE

## 2024-06-03 PROCEDURE — 3288F FALL RISK ASSESSMENT DOCD: CPT | Mod: CPTII,S$GLB,, | Performed by: FAMILY MEDICINE

## 2024-06-03 PROCEDURE — 1101F PT FALLS ASSESS-DOCD LE1/YR: CPT | Mod: CPTII,S$GLB,, | Performed by: FAMILY MEDICINE

## 2024-06-03 PROCEDURE — 3078F DIAST BP <80 MM HG: CPT | Mod: CPTII,S$GLB,, | Performed by: FAMILY MEDICINE

## 2024-06-03 PROCEDURE — 99999 PR PBB SHADOW E&M-EST. PATIENT-LVL III: CPT | Mod: PBBFAC,,, | Performed by: FAMILY MEDICINE

## 2024-06-03 PROCEDURE — 1160F RVW MEDS BY RX/DR IN RCRD: CPT | Mod: CPTII,S$GLB,, | Performed by: FAMILY MEDICINE

## 2024-06-03 PROCEDURE — 1126F AMNT PAIN NOTED NONE PRSNT: CPT | Mod: CPTII,S$GLB,, | Performed by: FAMILY MEDICINE

## 2024-06-03 PROCEDURE — 3074F SYST BP LT 130 MM HG: CPT | Mod: CPTII,S$GLB,, | Performed by: FAMILY MEDICINE

## 2024-06-03 PROCEDURE — 1159F MED LIST DOCD IN RCRD: CPT | Mod: CPTII,S$GLB,, | Performed by: FAMILY MEDICINE
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
## 2024-07-12 ENCOUNTER — OFFICE VISIT (OUTPATIENT)
Dept: ORTHOPEDICS | Facility: CLINIC | Age: 74
End: 2024-07-12
Payer: MEDICARE

## 2024-07-12 VITALS — BODY MASS INDEX: 22.5 KG/M2 | WEIGHT: 140 LBS | HEIGHT: 66 IN

## 2024-07-12 DIAGNOSIS — M17.11 PRIMARY OSTEOARTHRITIS OF RIGHT KNEE: ICD-10-CM

## 2024-07-12 DIAGNOSIS — M17.12 PRIMARY OSTEOARTHRITIS OF LEFT KNEE: Primary | ICD-10-CM

## 2024-07-12 PROCEDURE — 99999 PR PBB SHADOW E&M-EST. PATIENT-LVL III: CPT | Mod: PBBFAC,,, | Performed by: PHYSICIAN ASSISTANT

## 2024-07-12 RX ORDER — TRIAMCINOLONE ACETONIDE 40 MG/ML
40 INJECTION, SUSPENSION INTRA-ARTICULAR; INTRAMUSCULAR
Status: DISCONTINUED | OUTPATIENT
Start: 2024-07-12 | End: 2024-07-12 | Stop reason: HOSPADM

## 2024-07-12 RX ADMIN — TRIAMCINOLONE ACETONIDE 40 MG: 40 INJECTION, SUSPENSION INTRA-ARTICULAR; INTRAMUSCULAR at 12:07

## 2024-07-12 NOTE — PROCEDURES
Large Joint Aspiration/Injection: L knee    Date/Time: 7/12/2024 12:30 PM    Performed by: Ladarius Daniel PA  Authorized by: Ladarius Daniel PA    Consent Done?:  Yes (Verbal)  Indications:  Pain  Site marked: the procedure site was marked    Timeout: prior to procedure the correct patient, procedure, and site was verified    Prep: patient was prepped and draped in usual sterile fashion      Local anesthesia used?: Yes    Local anesthetic:  Lidocaine 1% without epinephrine    Details:  Needle Size:  25 G  Ultrasonic Guidance for needle placement?: No    Approach:  Anterolateral  Location:  Knee  Site:  L knee  Medications:  40 mg triamcinolone acetonide 40 mg/mL  Patient tolerance:  Patient tolerated the procedure well with no immediate complications  Large Joint Aspiration/Injection: R knee    Date/Time: 7/12/2024 12:30 PM    Performed by: Ladarius Daniel PA  Authorized by: Ladarius Daniel PA    Consent Done?:  Yes (Verbal)  Indications:  Pain  Site marked: the procedure site was marked    Timeout: prior to procedure the correct patient, procedure, and site was verified    Prep: patient was prepped and draped in usual sterile fashion      Local anesthesia used?: Yes    Local anesthetic:  Lidocaine 1% without epinephrine    Details:  Needle Size:  25 G  Ultrasonic Guidance for needle placement?: No    Approach:  Anterolateral  Location:  Knee  Site:  R knee  Medications:  40 mg triamcinolone acetonide 40 mg/mL  Patient tolerance:  Patient tolerated the procedure well with no immediate complications

## 2024-07-12 NOTE — PROGRESS NOTES
[facility]  [address]  [place] [address]  Phone: [phone]   [phone]    Patient's PCP: Rubens Tinajero III, MD  Referring Provider: No ref. provider found    Subjective:      Chief Complaint:   Chief Complaint   Patient presents with    Right Knee - Pain     Bilateral knee pain, last injected 1/29/24, the pain gradually comes back, the injections are still helping,     Left Knee - Pain       Past Medical History:   Diagnosis Date    Colon polyp     Hypercholesterolemia     Thyroid disease        Past Surgical History:   Procedure Laterality Date    AUGMENTATION OF BREAST      breast augmentation      CHEILECTOMY      COLONOSCOPY      10 years ago    HIP REPLACEMENT ARTHROPLASTY Left 12/4/2023    Procedure: ARTHROPLASTY, HIP REPLACEMENT, LEFT;  Surgeon: Boom Gunter MD;  Location: [facility];  Service: Orthopedics;  Laterality: Left;  [place]    HYSTERECTOMY         Current Outpatient Medications   Medication Sig    atorvastatin (LIPITOR) 20 MG tablet Take 1 tablet (20 mg total) by mouth once daily.    co-enzyme Q-10 30 mg capsule Take 30 mg by mouth 3 (three) times a week.    doxycycline (VIBRAMYCIN) 100 MG Cap Take 1 capsule (100 mg total) by mouth every 12 (twelve) hours.    levothyroxine (SYNTHROID) 100 MCG tablet Take one tab 6 days a week, take 1/2 tab one day a week    multivitamin (ONE DAILY MULTIVITAMIN) per tablet Take 1 tablet by mouth once daily.     No current facility-administered medications for this visit.       Review of patient's allergies indicates:  No Known Allergies    Family History   Problem Relation Name Age of Onset    Breast cancer Mother  82    No Known Problems Daughter      Melanoma Neg Hx      Colon cancer Neg Hx      Colon polyps Neg Hx      Cystic fibrosis Neg Hx      Esophageal cancer Neg Hx      Liver cancer Neg Hx      Rectal cancer Neg Hx      Stomach cancer Neg Hx      Ulcerative colitis Neg Hx         Social History     Socioeconomic  History    Marital status:    Tobacco Use    Smoking status: Never    Smokeless tobacco: Never   Substance and Sexual Activity    Alcohol use: Yes     Comment: social    Drug use: Not Currently     Social Determinants of Health     Stress: No Stress Concern Present (6/25/2020)    [employer] - Occupational Stress Questionnaire     Feeling of Stress : Not at all       Prior to meeting with the patient I reviewed the medical chart in [facility]. This included reviewing the previous progress notes from our office, review of the patient's last appointment with their primary care provider, review of any visits to the emergency room, and review of any pain management appointments or procedures.    History of present illness: 74 y.o. female,  returns to clinic today for follow up with the bilateral knees.  She is got osteoarthritis in the bilateral knees.  Worse in the patellofemoral joints.  But she does have some joint space narrowing bilaterally.  Last injected in the office in January.       Review of Systems:    Constitutional: Negative for chills, fever and weight loss.   HENT: Negative for congestion.    Eyes: Negative for discharge and redness.   Respiratory: Negative for cough and shortness of breath.    Cardiovascular: Negative for chest pain.   Gastrointestinal: Negative for nausea and vomiting.   Musculoskeletal: See HPI.   Skin: Negative for rash.   Neurological: Negative for headaches.   Endo/Heme/Allergies: Does not bruise/bleed easily.   Psychiatric/Behavioral: The patient is not nervous/anxious.    All other systems reviewed and are negative.       Objective:      Physical Examination:    Vital Signs:  There were no vitals filed for this visit.    Body mass index is 22.6 kg/m².    This a well-developed, well nourished patient in no acute distress.  They are alert and oriented and cooperative to examination.     Examination of the bilateral knees, skin is dry and intact, no  erythema or ecchymosis, no signs symptoms of infection.  No effusion.  Range of motion symmetrical bilaterally 0-120 degrees.  Stable anterior-posterior varus and valgus stress.  Calf soft nontender, straight leg raise negative.      Pertinent New Results:          XRAY Report / Interpretation:   No new images were taken today, images from January were reviewed.She has tricompartmental arthrosis with osteophyte formation.          Assessment:       1. Primary osteoarthritis of left knee    2. Primary osteoarthritis of right knee      Plan:     Primary osteoarthritis of left knee  -     Large Joint Aspiration/Injection: L knee    Primary osteoarthritis of right knee  -     Large Joint Aspiration/Injection: R knee        Follow up if symptoms worsen or fail to improve.    Osteoarthritis bilateral knees, we injected the bilateral knees today, anterior lateral approach sterile technique lidocaine and triamcinolone.  Patient tolerated well.  She has currently not interested in total knee arthroplasty.  She will follow up with us in 3 months or on an as-needed basis for repeat injections.        ADELINE Hart, PAFaheemC        This note was created using [employer] voice recognition software that occasionally misinterprets words or phrases.

## 2024-08-06 DIAGNOSIS — E03.9 HYPOTHYROIDISM, UNSPECIFIED TYPE: Primary | ICD-10-CM

## 2024-08-06 RX ORDER — LEVOTHYROXINE SODIUM 100 UG/1
TABLET ORAL
Qty: 90 TABLET | Refills: 2 | Status: SHIPPED | OUTPATIENT
Start: 2024-08-06

## 2024-08-20 ENCOUNTER — TELEPHONE (OUTPATIENT)
Dept: FAMILY MEDICINE | Facility: CLINIC | Age: 74
End: 2024-08-20
Payer: MEDICARE

## 2024-08-20 DIAGNOSIS — Z12.31 BREAST CANCER SCREENING BY MAMMOGRAM: Primary | ICD-10-CM

## 2024-08-20 NOTE — TELEPHONE ENCOUNTER
----- Message from Laura Bonilla sent at 8/20/2024 12:26 PM CDT -----  Contact: pt  Type: Needs Medical Advice         Who Called: pt  Best Call Back Number:[phone]  Additional Information: Requesting a call back regarding pt following up on request for mammo orders. Pt said she called Monday.  Pt is asking for [facility].   Please Advise- Thank you

## 2024-08-23 ENCOUNTER — HOSPITAL ENCOUNTER (OUTPATIENT)
Dept: RADIOLOGY | Facility: HOSPITAL | Age: 74
Discharge: HOME | End: 2024-08-23
Attending: FAMILY MEDICINE
Payer: MEDICARE

## 2024-08-23 VITALS — WEIGHT: 140 LBS | BODY MASS INDEX: 22.5 KG/M2 | HEIGHT: 66 IN

## 2024-08-23 DIAGNOSIS — Z12.31 BREAST CANCER SCREENING BY MAMMOGRAM: ICD-10-CM

## 2024-08-23 PROCEDURE — 77067 SCR MAMMO BI INCL CAD: CPT | Mod: TC,PO

## 2024-08-23 PROCEDURE — 77067 SCR MAMMO BI INCL CAD: CPT | Mod: 26,,, | Performed by: RADIOLOGY

## 2024-08-23 PROCEDURE — 77063 BREAST TOMOSYNTHESIS BI: CPT | Mod: 26,,, | Performed by: RADIOLOGY

## 2024-08-27 ENCOUNTER — HOSPITAL ENCOUNTER (OUTPATIENT)
Dept: RADIOLOGY | Facility: HOSPITAL | Age: 74
Discharge: HOME | End: 2024-08-27
Attending: ORTHOPAEDIC SURGERY
Payer: MEDICARE

## 2024-08-27 ENCOUNTER — OFFICE VISIT (OUTPATIENT)
Dept: ORTHOPEDICS | Facility: CLINIC | Age: 74
End: 2024-08-27
Payer: MEDICARE

## 2024-08-27 VITALS — BODY MASS INDEX: 22.5 KG/M2 | HEIGHT: 66 IN | WEIGHT: 140 LBS

## 2024-08-27 DIAGNOSIS — M51.36 DISC DEGENERATION, LUMBAR: ICD-10-CM

## 2024-08-27 DIAGNOSIS — M47.816 FACET ARTHRITIS OF LUMBAR REGION: ICD-10-CM

## 2024-08-27 DIAGNOSIS — R52 PAIN: ICD-10-CM

## 2024-08-27 DIAGNOSIS — Z96.642 HISTORY OF TOTAL LEFT HIP REPLACEMENT: Primary | ICD-10-CM

## 2024-08-27 DIAGNOSIS — M54.16 LUMBAR RADICULOPATHY: ICD-10-CM

## 2024-08-27 DIAGNOSIS — M46.06 SPINAL ENTHESOPATHY OF LUMBAR REGION: ICD-10-CM

## 2024-08-27 PROCEDURE — 72100 X-RAY EXAM L-S SPINE 2/3 VWS: CPT | Mod: 26,,, | Performed by: RADIOLOGY

## 2024-08-27 PROCEDURE — 99999 PR PBB SHADOW E&M-EST. PATIENT-LVL III: CPT | Mod: PBBFAC,,, | Performed by: ORTHOPAEDIC SURGERY

## 2024-08-27 PROCEDURE — 72100 X-RAY EXAM L-S SPINE 2/3 VWS: CPT | Mod: TC,PN

## 2024-08-27 RX ORDER — TRIAMCINOLONE ACETONIDE 40 MG/ML
40 INJECTION, SUSPENSION INTRA-ARTICULAR; INTRAMUSCULAR
Status: DISCONTINUED | OUTPATIENT
Start: 2024-08-27 | End: 2024-08-27 | Stop reason: HOSPADM

## 2024-08-27 RX ADMIN — TRIAMCINOLONE ACETONIDE 40 MG: 40 INJECTION, SUSPENSION INTRA-ARTICULAR; INTRAMUSCULAR at 11:08

## 2024-08-27 NOTE — PROCEDURES
Trigger Point Injection    Performed by: Boom Gunter MD  Authorized by: Boom Gunter MD    Lumbar Paraspinal:  Left    Consent Done?:  Yes (Verbal)    Pre-Procedure:   Indications:  Pain  Site marked: the procedure site was marked     Timeout: prior to procedure the correct patient, procedure, and site was verified    Prep: patient was prepped and draped in usual sterile fashion     Local anesthesia used?: Yes    Local anesthetic:  Lidocaine 1% without epinephrine  Medications: 40 mg triamcinolone acetonide 40 mg/mL

## 2024-08-27 NOTE — PROGRESS NOTES
Formerly [facility]    Subjective:     Chief Complaint:   Chief Complaint   Patient presents with    Left Hip - Pain     Left Hip pain and swelling/Hx VELMA 12/2023, states her pain is incision has a hard know, feels like its scar tissue, pain is over GTB       Past Medical History:   Diagnosis Date    Colon polyp     Hypercholesterolemia     Thyroid disease        Past Surgical History:   Procedure Laterality Date    AUGMENTATION OF BREAST      breast augmentation      CHEILECTOMY      COLONOSCOPY      10 years ago    HIP REPLACEMENT ARTHROPLASTY Left 12/4/2023    Procedure: ARTHROPLASTY, HIP REPLACEMENT, LEFT;  Surgeon: Boom Gunter MD;  Location: [facility];  Service: Orthopedics;  Laterality: Left;  Scott    HYSTERECTOMY         Current Outpatient Medications   Medication Sig    atorvastatin (LIPITOR) 20 MG tablet Take 1 tablet (20 mg total) by mouth once daily.    co-enzyme Q-10 30 mg capsule Take 30 mg by mouth 3 (three) times a week.    doxycycline (VIBRAMYCIN) 100 MG Cap Take 1 capsule (100 mg total) by mouth every 12 (twelve) hours.    levothyroxine (SYNTHROID) 100 MCG tablet Take one tab 6 days a week, take 1/2 tab one day a week    multivitamin (ONE DAILY MULTIVITAMIN) per tablet Take 1 tablet by mouth once daily.     No current facility-administered medications for this visit.       Review of patient's allergies indicates:  No Known Allergies    Family History   Problem Relation Name Age of Onset    Breast cancer Mother  82    No Known Problems Daughter      Melanoma Neg Hx      Colon cancer Neg Hx      Colon polyps Neg Hx      Cystic fibrosis Neg Hx      Esophageal cancer Neg Hx      Liver cancer Neg Hx      Rectal cancer Neg Hx      Stomach cancer Neg Hx      Ulcerative colitis Neg Hx         Social History     Socioeconomic History    Marital status:    Tobacco Use    Smoking status: Never    Smokeless tobacco: Never   Substance and Sexual Activity    Alcohol use: Yes     Comment: social     Drug use: Not Currently     Social Determinants of Health     Stress: No Stress Concern Present (6/25/2020)    [employer] - Occupational Stress Questionnaire     Feeling of Stress : Not at all       History of present illness:  Patient comes in today for her left hip.  She is having pain in the left buttock region.  She has no groin pain.      Review of Systems:    Constitution: Negative for chills, fever, and sweats.  Negative for unexplained weight loss.    HENT:  Negative for headaches and blurry vision.    Cardiovascular:Negative for chest pain or irregular heart beat. Negative for hypertension.    Respiratory:  Negative for cough and shortness of breath.    Gastrointestinal: Negative for abdominal pain, heartburn, melena, nausea, and vomitting.    Genitourinary:  Negative bladder incontinence and dysuria.    Musculoskeletal:  See HPI for details.     Neurological: Negative for numbness.    Psychiatric/Behavioral: Negative for depression.  The patient is not nervous/anxious.      Endocrine: Negative for polyuria    Hematologic/Lymphatic: Negative for bleeding problem.  Does not bruise/bleed easily.    Skin: Negative for poor would healing and rash    Objective:      Physical Examination:    Vital Signs:  There were no vitals filed for this visit.    Body mass index is 22.6 kg/m².    This a well-developed, well nourished patient in no acute distress.  They are alert and oriented and cooperative to examination.        Patient has no groin pain with rigorous motion of her hips.  She has symmetric leg lengths.  Her wound from her total hip looks perfect.  No redness no drainage.  She has a positive straight leg raise on the left side.  Pertinent New Results:    XRAY Report / Interpretation:   Left hip x-rays demonstrate a total hip arthroplasty to be in ideal position.  No change in position.      AP and lateral lumbar spine demonstrates mild degenerative changes throughout the lumbar  spine    Assessment/Plan:      Lumbar radiculitis.  I injected a trigger point in the left paralumbar region.  I have started her on physical therapy.  She will follow-up in a week's      This note was created using Dragon voice recognition software that occasionally misinterpreted phrases or words.

## 2024-09-10 RX ORDER — ATORVASTATIN CALCIUM 20 MG/1
20 TABLET, FILM COATED ORAL
Qty: 90 TABLET | Refills: 1 | Status: SHIPPED | OUTPATIENT
Start: 2024-09-10

## 2024-09-10 NOTE — TELEPHONE ENCOUNTER
Refill Decision Note   Olivia Dominguez  is requesting a refill authorization.  Brief Assessment and Rationale for Refill:  Approve     Medication Therapy Plan:         Comments:     Note composed:3:15 PM 09/10/2024

## 2024-09-10 NOTE — TELEPHONE ENCOUNTER
No care due was identified.  [facility] Embedded Care Due Messages. Reference number: [id number].   9/10/2024 11:49:06 AM CDT

## 2024-12-16 ENCOUNTER — PATIENT OUTREACH (OUTPATIENT)
Dept: ADMINISTRATIVE | Facility: HOSPITAL | Age: 74
End: 2024-12-16
Payer: MEDICARE

## 2024-12-16 ENCOUNTER — PATIENT MESSAGE (OUTPATIENT)
Dept: ADMINISTRATIVE | Facility: HOSPITAL | Age: 74
End: 2024-12-16
Payer: MEDICARE

## 2024-12-16 DIAGNOSIS — Z78.0 MENOPAUSE: ICD-10-CM

## 2024-12-23 ENCOUNTER — TELEPHONE (OUTPATIENT)
Dept: DERMATOLOGY | Facility: CLINIC | Age: 74
End: 2024-12-23
Payer: MEDICARE

## 2024-12-23 NOTE — TELEPHONE ENCOUNTER
Called and got patient scheduled.     ----- Message from Kimberley sent at 12/23/2024  1:34 PM CST -----  Contact: self  Type:  Sooner Appointment Request    Caller is requesting a sooner appointment.  Caller declined first available appointment listed below.  Caller will not accept being placed on the waitlist and is requesting a message be sent to doctor.    Name of Caller:  pt  When is the first available appointment?  N/a  Symptoms:  annual skin check  Would the patient rather a call back or a response via [facility]? call  Best Call Back Number:  [phone]   Additional Information:  please call (I checked all the way to August)   GYNECOLOGY PRE-OPERATIVE H&P    I.   IDENTIFYING DATA       Patient is Jojo Shi a 52 year old female. MRN is [id number].  is 1971.       - Date of Surgery: 2023       - Attending: Tate    II.  PRE-OP DIAGNOSIS       Endometrial hyperplasia    III.  PLANNED PROCEDURE        Total laparoscopic hysterectomy, bilateral salpingo-oophorectomy, cystoscopy    IV.  BRIEF HISTORY  Jojo Shi is a 52 year old  w/ stage I breast cancer (ER/KS+, Her2 neg) s/p L breast lumpectomy and sentinel lymph node dissection who presents with endometrial hyperplasia without atypia. Patient became amenorrheic on tamoxifen, but then patient had an episode of bleeding in 2022 and was noted to have an US at [facility] that demonstrated a thickened endometrium with multiple cystic foci. She underwent an Embx in 2022 that demonstrated endometrium with extensive breakdown changes.  Repeat US in 4/10/2023 EMS 14mm, with vascularity, possible polyp.  [facility] D&C polypectomy performed 2023 demonstrated endometrial polyp, proliferative change and focal simple hyperplasia without atypia.        V. PAST OB/GYN HISTORY  OB History    Para Term  AB Living   3 2 2   1 2   SAB IAB Ectopic Molar Multiple Live Births     1       2      # Outcome Date GA Lbr Sukhdev/2nd Weight Sex Delivery Anes PTL Lv   3 IAB            2 Term 02    M CS-LTranv   MILLICENT      Birth Comments:    1 Term 00    F CS-LTranv   MILLICENT      Birth Comments:        VI. SIGNIFICANT PAST MEDICAL HISTORY  Past Medical History:   Diagnosis Date   • Allergic rhinitis    • Anemia, iron deficiency    • Breast cancer (CMD) 2019    Left breast cancer   • Cervical disc herniation    • Deviated nasal septum    • Diabetes mellitus (CMD)    • Endometrial hyperplasia without atypia    • Heart murmur    • Nasal turbinate hypertrophy    • Sleep apnea     no CPAP, refused to try   • Unspecified cervical disc disorder at C6-C7 level         VII. PAST SURGICAL HISTORY  Past Surgical History:   Procedure Laterality Date   • Breast surgery Left 2019    lumpectomy/ LEFT UPPER EXTREMITY LIMB PRECAUTION   •  section, low transverse      x2   • Dilation and curettage  2023    polyps and simple hyperplasia without atypia   • Tubal ligation          VIII.  ALLERGIES  ALLERGIES:   Allergen Reactions   • Dairy Digestive RASH and GI UPSET     Dairy products.    • Milk   (Food Or Med) RASH       IX.  MEDICATIONS  A. Outpatient medications:  No current facility-administered medications for this encounter.     Current Outpatient Medications   Medication Sig Dispense Refill   • ergocalciferol (DRISDOL) 1.25 mg (50,000 units) capsule Take 1.25 mg by mouth 1 day a week.     • Alcohol Swabs (Pro Comfort Alcohol) 70 % Pads      • hydroCORTisone (ANUSOL-HC) 2.5 % rectal cream      • tamoxifen (NOLVADEX) 20 MG tablet Take 1 tablet by mouth nightly. 90 tablet 3   • Blood Glucose Monitoring Suppl (ONE TOUCH ULTRA 2) w/Device Kit      • OneTouch Ultra test strip      • Lancets (OneTouch Delica Plus [id number]) Misc      • metFORMIN (GLUCOPHAGE) 500 MG tablet 500 mg daily (with breakfast).           X.  SOCIAL HISTORY  Social History     Tobacco Use   • Smoking status: Never   • Smokeless tobacco: Never   Vaping Use   • Vaping Use: never used   Substance Use Topics   • Alcohol use: Never   • Drug use: Never       XII.  FAMILY HISTORY  Family History   Problem Relation Age of Onset   • Hypertension Mother    • Hypertension Father    • Cancer, Breast Neg Hx    • Cancer, Ovarian Neg Hx    • Cancer, Endometrial Neg Hx    • Cancer, Colon Neg Hx    • Clotting Disorder Neg Hx        XIII. Physical Exam    Pertinent portions of exam to be completed on day of surgery.    XIV. REVIEW OF LABORATORY, PATHOLOGY, AND RADIOLOGY DATA    TVUS 2023:  Uterus 6.8 x 5.1 x 6.8cm. Uterus 10 week size, globular in shape and mottled in texture. There si anterior-posterior  myometrial asymmetry and uterine striations seen, suggestive of adenomyosis. An intramural fibroid seen. EMS 14.9mm. Bilateral ovaries WNL     XV. ASSESSMENT AND PLAN   Jojo Shi is a 52 year old female with endometrial hyperplasia without atypia.     --Plan for Total laparoscopic hysterectomy, bilateral salpingo-oophorectomy, cystoscopy  --Consent to be obtained on day of procedure  --Antibiotics ancef 2g  -- VTE prophylaxis: SCDs, lovenox   --Preop medications: pyridium 200mg    Elaine Parnell MD   PGY5 MIGS Fellow  Obstetrics and Gynecology

## 2024-12-30 ENCOUNTER — HOSPITAL ENCOUNTER (OUTPATIENT)
Dept: RADIOLOGY | Facility: HOSPITAL | Age: 74
Discharge: HOME | End: 2024-12-30
Attending: FAMILY MEDICINE
Payer: MEDICARE

## 2024-12-30 DIAGNOSIS — Z78.0 MENOPAUSE: ICD-10-CM

## 2024-12-30 PROCEDURE — 77080 DXA BONE DENSITY AXIAL: CPT | Mod: 26,,, | Performed by: RADIOLOGY

## 2024-12-30 PROCEDURE — 77080 DXA BONE DENSITY AXIAL: CPT | Mod: TC,PO

## 2025-01-03 ENCOUNTER — OFFICE VISIT (OUTPATIENT)
Dept: ORTHOPEDICS | Facility: CLINIC | Age: 75
End: 2025-01-03
Payer: MEDICARE

## 2025-01-03 VITALS — HEIGHT: 66 IN | WEIGHT: 140 LBS | BODY MASS INDEX: 22.5 KG/M2

## 2025-01-03 DIAGNOSIS — M17.11 PRIMARY OSTEOARTHRITIS OF RIGHT KNEE: ICD-10-CM

## 2025-01-03 DIAGNOSIS — M17.12 PRIMARY OSTEOARTHRITIS OF LEFT KNEE: Primary | ICD-10-CM

## 2025-01-03 PROCEDURE — 99999 PR PBB SHADOW E&M-EST. PATIENT-LVL III: CPT | Mod: PBBFAC,,, | Performed by: PHYSICIAN ASSISTANT

## 2025-01-03 RX ORDER — TRIAMCINOLONE ACETONIDE 40 MG/ML
40 INJECTION, SUSPENSION INTRA-ARTICULAR; INTRAMUSCULAR
Status: DISCONTINUED | OUTPATIENT
Start: 2025-01-03 | End: 2025-01-03 | Stop reason: HOSPADM

## 2025-01-03 RX ORDER — MELOXICAM 15 MG/1
15 TABLET ORAL
COMMUNITY
Start: 2024-09-04

## 2025-01-03 RX ADMIN — TRIAMCINOLONE ACETONIDE 40 MG: 40 INJECTION, SUSPENSION INTRA-ARTICULAR; INTRAMUSCULAR at 12:01

## 2025-01-03 NOTE — PROCEDURES
Large Joint Aspiration/Injection: R knee    Date/Time: 1/3/2025 12:45 PM    Performed by: Ladarius Daniel PA  Authorized by: Ladarius Daniel PA    Consent Done?:  Yes (Verbal)  Indications:  Pain and arthritis  Site marked: the procedure site was marked    Timeout: prior to procedure the correct patient, procedure, and site was verified    Prep: patient was prepped and draped in usual sterile fashion      Local anesthesia used?: Yes    Local anesthetic:  Lidocaine 1% without epinephrine    Details:  Needle Size:  25 G  Ultrasonic Guidance for needle placement?: No    Approach:  Anterolateral  Location:  Knee  Site:  R knee  Medications:  40 mg triamcinolone acetonide 40 mg/mL  Patient tolerance:  Patient tolerated the procedure well with no immediate complications  Large Joint Aspiration/Injection: L knee    Date/Time: 1/3/2025 12:45 PM    Performed by: Ladarius Daniel PA  Authorized by: Ladarius Daniel PA    Consent Done?:  Yes (Verbal)  Indications:  Pain and arthritis  Site marked: the procedure site was marked    Timeout: prior to procedure the correct patient, procedure, and site was verified    Prep: patient was prepped and draped in usual sterile fashion      Local anesthesia used?: Yes    Local anesthetic:  Lidocaine 1% without epinephrine    Details:  Needle Size:  25 G  Ultrasonic Guidance for needle placement?: No    Approach:  Anterolateral  Location:  Knee  Site:  L knee  Medications:  40 mg triamcinolone acetonide 40 mg/mL  Patient tolerance:  Patient tolerated the procedure well with no immediate complications

## 2025-01-03 NOTE — PROGRESS NOTES
[facility]  [address]  [place] [address]  Phone: [phone]   [phone]    Patient's PCP: Rubens Tinajero III, MD  Referring Provider: No ref. provider found    Subjective:      Chief Complaint:   Chief Complaint   Patient presents with    Right Knee - Pain     Patient is here for bilateral knee pain, states pain has stayed about the same since last visit. Would like to repeat injections today     Left Knee - Pain       Past Medical History:   Diagnosis Date    Colon polyp     Hypercholesterolemia     Thyroid disease        Past Surgical History:   Procedure Laterality Date    AUGMENTATION OF BREAST      breast augmentation      CHEILECTOMY      COLONOSCOPY      10 years ago    HIP REPLACEMENT ARTHROPLASTY Left 12/4/2023    Procedure: ARTHROPLASTY, HIP REPLACEMENT, LEFT;  Surgeon: Boom Gunter MD;  Location: [facility];  Service: Orthopedics;  Laterality: Left;  Scott    HYSTERECTOMY         Current Outpatient Medications   Medication Sig    atorvastatin (LIPITOR) 20 MG tablet TAKE 1 TABLET(20 MG) BY MOUTH EVERY DAY    co-enzyme Q-10 30 mg capsule Take 30 mg by mouth 3 (three) times a week.    doxycycline (VIBRAMYCIN) 100 MG Cap Take 1 capsule (100 mg total) by mouth every 12 (twelve) hours.    levothyroxine (SYNTHROID) 100 MCG tablet Take one tab 6 days a week, take 1/2 tab one day a week    meloxicam (MOBIC) 15 MG tablet Take 15 mg by mouth.    multivitamin (ONE DAILY MULTIVITAMIN) per tablet Take 1 tablet by mouth once daily.     No current facility-administered medications for this visit.       Review of patient's allergies indicates:  No Known Allergies    Family History   Problem Relation Name Age of Onset    Breast cancer Mother  82    No Known Problems Daughter      Melanoma Neg Hx      Colon cancer Neg Hx      Colon polyps Neg Hx      Cystic fibrosis Neg Hx      Esophageal cancer Neg Hx      Liver cancer Neg Hx      Rectal cancer Neg Hx      Stomach cancer Neg Hx       Ulcerative colitis Neg Hx         Social History     Socioeconomic History    Marital status:    Tobacco Use    Smoking status: Never    Smokeless tobacco: Never   Substance and Sexual Activity    Alcohol use: Yes     Comment: social    Drug use: Not Currently     Social Drivers of Health     Stress: No Stress Concern Present (6/25/2020)    [facility] [employer] - Occupational Stress Questionnaire     Feeling of Stress : Not at all       Prior to meeting with the patient I reviewed the medical chart in [facility]. This included reviewing the previous progress notes from our office, review of the patient's last appointment with their primary care provider, review of any visits to the emergency room, and review of any pain management appointments or procedures.    History of present illness: 74 y.o. female,  returns to clinic today for follow up  bilateral knees. She has known osteoarthritis in the bilateral knees. Worse in the patellofemoral joints. But she does have some joint space narrowing bilaterally. Last injected in the office in July.        Review of Systems:    Constitutional: Negative for chills, fever and weight loss.   HENT: Negative for congestion.    Eyes: Negative for discharge and redness.   Respiratory: Negative for cough and shortness of breath.    Cardiovascular: Negative for chest pain.   Gastrointestinal: Negative for nausea and vomiting.   Musculoskeletal: See HPI.   Skin: Negative for rash.   Neurological: Negative for headaches.   Endo/Heme/Allergies: Does not bruise/bleed easily.   Psychiatric/Behavioral: The patient is not nervous/anxious.    All other systems reviewed and are negative.       Objective:      Physical Examination:    Vital Signs:  There were no vitals filed for this visit.    Body mass index is 22.6 kg/m².    This a well-developed, well nourished patient in no acute distress.  They are alert and oriented and cooperative to examination.     Examination of  the bilateral knees, skin is dry and intact, no erythema or ecchymosis, no signs symptoms of infection.  No effusion.  Range of motion symmetrical bilaterally 0-120 degrees.  Stable anterior-posterior varus and valgus stress.  Homans signs negative, straight leg raise negative.         Pertinent New Results:          XRAY Report / Interpretation:               Assessment:       1. Primary osteoarthritis of left knee    2. Primary osteoarthritis of right knee      Plan:     Primary osteoarthritis of left knee  -     Large Joint Aspiration/Injection: L knee    Primary osteoarthritis of right knee  -     Large Joint Aspiration/Injection: R knee        Follow up in about 3 months (around 4/3/2025), or bilat knee injec 1.3.2025.    Osteoarthritis bilateral knees, we injected the bilateral knees today, anterior lateral approach sterile technique lidocaine and triamcinolone. Patient tolerated well. She has currently not interested in total knee arthroplasty. She will follow up with us in 3 months or on an as-needed basis for repeat injections.     The patient and I had a thorough discussion today.  We discussed the working diagnosis as well as several other potential alternative diagnoses.  We discussed treatment options, both conservative and surgical.  Conservative treatment options would include things such as activity modifications, workplace modifications, a period of rest, oral versus topical over the counter and oral versus topical prescription anti-inflammatory medications, physical therapy / occupational therapy, splinting / bracing, immobilization, corticosteroid injections, and others.        ADELINE Hart, PA-C        EMR Statement:  Please note that portions of this patient encounter record were imported from the patients electronic medical record and that others were dictated using voice recognition software. For these reasons grammatical errors, nonsensical language, and apparently contradictory  statements may be present.  These should be disregarded or interpreted with respect to the context of the document.

## 2025-03-10 ENCOUNTER — OFFICE VISIT (OUTPATIENT)
Dept: FAMILY MEDICINE | Facility: CLINIC | Age: 75
End: 2025-03-10
Payer: MEDICARE

## 2025-03-10 VITALS
DIASTOLIC BLOOD PRESSURE: 70 MMHG | OXYGEN SATURATION: 98 % | HEIGHT: 66 IN | SYSTOLIC BLOOD PRESSURE: 152 MMHG | HEART RATE: 79 BPM | TEMPERATURE: 99 F | BODY MASS INDEX: 22.36 KG/M2 | WEIGHT: 139.13 LBS

## 2025-03-10 DIAGNOSIS — R07.1 CHEST PAIN ON BREATHING: ICD-10-CM

## 2025-03-10 DIAGNOSIS — E78.5 HYPERLIPIDEMIA, UNSPECIFIED HYPERLIPIDEMIA TYPE: ICD-10-CM

## 2025-03-10 DIAGNOSIS — R07.9 CHEST PAIN, UNSPECIFIED TYPE: Primary | ICD-10-CM

## 2025-03-10 DIAGNOSIS — R79.9 ABNORMAL BLOOD CHEMISTRY: ICD-10-CM

## 2025-03-10 DIAGNOSIS — E03.9 HYPOTHYROIDISM, UNSPECIFIED TYPE: ICD-10-CM

## 2025-03-10 PROCEDURE — 99999 PR PBB SHADOW E&M-EST. PATIENT-LVL IV: CPT | Mod: PBBFAC,,, | Performed by: NURSE PRACTITIONER

## 2025-03-10 PROCEDURE — 1126F AMNT PAIN NOTED NONE PRSNT: CPT | Mod: CPTII,S$GLB,, | Performed by: NURSE PRACTITIONER

## 2025-03-10 PROCEDURE — 99214 OFFICE O/P EST MOD 30 MIN: CPT | Mod: S$GLB,,, | Performed by: NURSE PRACTITIONER

## 2025-03-10 PROCEDURE — 1101F PT FALLS ASSESS-DOCD LE1/YR: CPT | Mod: CPTII,S$GLB,, | Performed by: NURSE PRACTITIONER

## 2025-03-10 PROCEDURE — 3078F DIAST BP <80 MM HG: CPT | Mod: CPTII,S$GLB,, | Performed by: NURSE PRACTITIONER

## 2025-03-10 PROCEDURE — 1159F MED LIST DOCD IN RCRD: CPT | Mod: CPTII,S$GLB,, | Performed by: NURSE PRACTITIONER

## 2025-03-10 PROCEDURE — 3288F FALL RISK ASSESSMENT DOCD: CPT | Mod: CPTII,S$GLB,, | Performed by: NURSE PRACTITIONER

## 2025-03-10 PROCEDURE — 3077F SYST BP >= 140 MM HG: CPT | Mod: CPTII,S$GLB,, | Performed by: NURSE PRACTITIONER

## 2025-03-10 RX ORDER — ATORVASTATIN CALCIUM 20 MG/1
20 TABLET, FILM COATED ORAL DAILY
Qty: 90 TABLET | Refills: 1 | Status: SHIPPED | OUTPATIENT
Start: 2025-03-10

## 2025-03-10 NOTE — PROGRESS NOTES
Subjective:       Patient ID: Olivia Mix is a 75 y.o. female.    Chief Complaint: Back Pain    HPI    Pt complains of bilateral chest pain that shoots from midsternal area around to back. Pain only occurs at night when she gets into bed and is in supine position and takes a deep breath. She is then able to fall asleep, but when she wakes up again, she feels the pain agian. However when she goes to yoga and lays flat there and takes deep breaths, does not have the pain. Pt denies any chest pain, palpitations, LE pain, redness, edema or swelling. She is very active, exercising regularly. Continues to be able to perform her normal activities.        Review of patient's allergies indicates:  No Known Allergies  Social Drivers of Health     Tobacco Use: Low Risk  (3/10/2025)    Patient History     Smoking Tobacco Use: Never     Smokeless Tobacco Use: Never     Passive Exposure: Not on file   Alcohol Use: Not on file   Financial Resource Strain: Not on file   Food Insecurity: Not on file   Transportation Needs: Not on file   Physical Activity: Not on file   Stress: No Stress Concern Present (6/25/2020)    [employer] - Occupational Stress Questionnaire     Feeling of Stress : Not at all   Housing Stability: Not on file   Depression: Low Risk  (3/10/2025)    Depression     Last PHQ-4: Flowsheet Data: 0   Utilities: Not on file   Health Literacy: Not on file   Social Isolation: Not on file      Past Medical History:   Diagnosis Date    Colon polyp     Hypercholesterolemia     Thyroid disease       Past Surgical History:   Procedure Laterality Date    AUGMENTATION OF BREAST      breast augmentation      CHEILECTOMY      COLONOSCOPY      10 years ago    HIP REPLACEMENT ARTHROPLASTY Left 12/4/2023    Procedure: ARTHROPLASTY, HIP REPLACEMENT, LEFT;  Surgeon: Boom Gunter MD;  Location: [facility];  Service: Orthopedics;  Laterality: Left;  Scott    HYSTERECTOMY        Social History[1]    Current  "Medications[2]    Review of Systems   Constitutional:  Negative for chills and fever.   Respiratory:  Negative for shortness of breath and wheezing.    Cardiovascular:  Positive for chest pain. Negative for palpitations and leg swelling.   Gastrointestinal:  Negative for constipation.   Neurological:  Negative for dizziness and light-headedness.       Objective:      Vitals:    03/10/25 1623   BP: (!) 152/70   Pulse: 79   Temp: 98.7 °F (37.1 °C)   SpO2: 98%   Weight: 63.1 kg (139 lb 1.8 oz)   Height: 5' 6" (1.676 m)      Physical Exam  Constitutional:       Appearance: Normal appearance.   HENT:      Head: Normocephalic and atraumatic.   Eyes:      Conjunctiva/sclera: Conjunctivae normal.   Cardiovascular:      Rate and Rhythm: Normal rate and regular rhythm.      Heart sounds: Normal heart sounds.   Pulmonary:      Effort: Pulmonary effort is normal. No respiratory distress.      Breath sounds: Normal breath sounds. No wheezing.   Chest:      Chest wall: No mass, swelling, tenderness or crepitus.   Abdominal:      General: There is no distension.      Palpations: There is no mass.      Tenderness: There is no abdominal tenderness.   Musculoskeletal:      Right lower leg: No edema.      Left lower leg: No edema.   Skin:     General: Skin is warm.   Neurological:      Mental Status: She is alert and oriented to person, place, and time.   Psychiatric:         Mood and Affect: Mood normal.         Behavior: Behavior normal.         Assessment:       1. Chest pain, unspecified type    2. Hypothyroidism, unspecified type    3. Hyperlipidemia, unspecified hyperlipidemia type    4. Abnormal blood chemistry    5. Chest pain on breathing        Plan:       Olivia was seen today for back pain.    Diagnoses and all orders for this visit:    Chest pain, unspecified type  -     X-Ray Chest PA And Lateral; Future  -     Cancel: CTA Chest Aorta Non Coronary; Future  -     CBC Auto Differential; Future  -     D-DIMER, QUANTITATIVE; " Future  -     TROPONIN I; Future  -     CBC Auto Differential  -     D-DIMER, QUANTITATIVE  -     TROPONIN I  -     B-TYPE NATRIURETIC PEPTIDE; Future  -     B-TYPE NATRIURETIC PEPTIDE    Hypothyroidism, unspecified type  -     TSH; Future  -     TSH    Hyperlipidemia, unspecified hyperlipidemia type  -     Lipid Panel; Future  -     Comprehensive Metabolic Panel; Future  -     Lipid Panel  -     Comprehensive Metabolic Panel  -     atorvastatin (LIPITOR) 20 MG tablet; Take 1 tablet (20 mg total) by mouth once daily.    Abnormal blood chemistry  -     Hemoglobin A1C; Future  -     Comprehensive Metabolic Panel; Future  -     Hemoglobin A1C  -     Comprehensive Metabolic Panel    Chest pain on breathing  -     Cancel: CT Chest Without Contrast; Future    Other orders  -     BNP     Follow up 2 weeks, sooner if needed.  Patient verbalized understanding and agrees with plan.               [1]   Social History  Socioeconomic History    Marital status:    [2]   Current Outpatient Medications:     co-enzyme Q-10 30 mg capsule, Take 30 mg by mouth 3 (three) times a week., Disp: , Rfl:     levothyroxine (SYNTHROID) 100 MCG tablet, Take one tab 6 days a week, take 1/2 tab one day a week, Disp: 90 tablet, Rfl: 2    meloxicam (MOBIC) 15 MG tablet, Take 15 mg by mouth., Disp: , Rfl:     multivitamin (ONE DAILY MULTIVITAMIN) per tablet, Take 1 tablet by mouth once daily., Disp: , Rfl:     apixaban (ELIQUIS) 5 mg Tab, Take 2 tablets (10 mg total) by mouth 2 (two) times daily for 7 days, THEN 1 tablet (5 mg total) 2 (two) times daily for 23 days., Disp: 74 tablet, Rfl: 0    atorvastatin (LIPITOR) 20 MG tablet, Take 1 tablet (20 mg total) by mouth once daily., Disp: 90 tablet, Rfl: 1    doxycycline (VIBRAMYCIN) 100 MG Cap, Take 1 capsule (100 mg total) by mouth every 12 (twelve) hours. (Patient not taking: Reported on 3/10/2025), Disp: 20 capsule, Rfl: 1

## 2025-03-11 ENCOUNTER — RESULTS FOLLOW-UP (OUTPATIENT)
Dept: FAMILY MEDICINE | Facility: CLINIC | Age: 75
End: 2025-03-11

## 2025-03-11 ENCOUNTER — HOSPITAL ENCOUNTER (OUTPATIENT)
Dept: RADIOLOGY | Facility: HOSPITAL | Age: 75
Discharge: HOME | End: 2025-03-11
Attending: NURSE PRACTITIONER
Payer: MEDICARE

## 2025-03-11 DIAGNOSIS — R07.9 CHEST PAIN, UNSPECIFIED TYPE: ICD-10-CM

## 2025-03-11 PROCEDURE — 71046 X-RAY EXAM CHEST 2 VIEWS: CPT | Mod: 26,,, | Performed by: RADIOLOGY

## 2025-03-11 PROCEDURE — 71046 X-RAY EXAM CHEST 2 VIEWS: CPT | Mod: TC

## 2025-03-11 NOTE — PROGRESS NOTES
Please call patient to inform her:  Your chest xray is normal.  Will await CT scan results and will be in touch when that comes through.

## 2025-03-12 ENCOUNTER — RESULTS FOLLOW-UP (OUTPATIENT)
Dept: FAMILY MEDICINE | Facility: CLINIC | Age: 75
End: 2025-03-12

## 2025-03-12 ENCOUNTER — HOSPITAL ENCOUNTER (OUTPATIENT)
Dept: RADIOLOGY | Facility: HOSPITAL | Age: 75
Discharge: HOME | End: 2025-03-12
Attending: NURSE PRACTITIONER
Payer: MEDICARE

## 2025-03-12 ENCOUNTER — TELEPHONE (OUTPATIENT)
Dept: FAMILY MEDICINE | Facility: CLINIC | Age: 75
End: 2025-03-12
Payer: MEDICARE

## 2025-03-12 ENCOUNTER — HOSPITAL ENCOUNTER (EMERGENCY)
Facility: HOSPITAL | Age: 75
Discharge: HOME | End: 2025-03-12
Attending: STUDENT IN AN ORGANIZED HEALTH CARE EDUCATION/TRAINING PROGRAM
Payer: MEDICARE

## 2025-03-12 ENCOUNTER — DOCUMENTATION ONLY (OUTPATIENT)
Dept: FAMILY MEDICINE | Facility: CLINIC | Age: 75
End: 2025-03-12
Payer: MEDICARE

## 2025-03-12 VITALS
WEIGHT: 138 LBS | DIASTOLIC BLOOD PRESSURE: 70 MMHG | BODY MASS INDEX: 22.18 KG/M2 | SYSTOLIC BLOOD PRESSURE: 146 MMHG | OXYGEN SATURATION: 99 % | RESPIRATION RATE: 16 BRPM | HEART RATE: 76 BPM | HEIGHT: 66 IN | TEMPERATURE: 98 F

## 2025-03-12 DIAGNOSIS — R07.1 CHEST PAIN ON BREATHING: Primary | ICD-10-CM

## 2025-03-12 DIAGNOSIS — R07.9 CHEST PAIN: ICD-10-CM

## 2025-03-12 DIAGNOSIS — R07.1 CHEST PAIN ON BREATHING: ICD-10-CM

## 2025-03-12 DIAGNOSIS — I26.99 PULMONARY EMBOLISM: ICD-10-CM

## 2025-03-12 LAB
ALBUMIN SERPL BCP-MCNC: 4.6 G/DL (ref 3.5–5.2)
ALBUMIN SERPL-MCNC: 4.3 G/DL (ref 3.6–5.1)
ALBUMIN/GLOB SERPL: 1.6 (CALC) (ref 1–2.5)
ALP SERPL-CCNC: 64 U/L (ref 55–135)
ALP SERPL-CCNC: 66 U/L (ref 37–153)
ALT SERPL W/O P-5'-P-CCNC: 18 U/L (ref 10–44)
ALT SERPL-CCNC: 16 U/L (ref 6–29)
ANION GAP SERPL CALC-SCNC: 8 MMOL/L (ref 8–16)
AST SERPL-CCNC: 22 U/L (ref 10–35)
AST SERPL-CCNC: 25 U/L (ref 10–40)
BASOPHILS # BLD AUTO: 0.06 K/UL (ref 0–0.2)
BASOPHILS # BLD AUTO: 64 CELLS/UL (ref 0–200)
BASOPHILS NFR BLD AUTO: 0.9 %
BASOPHILS NFR BLD: 0.8 % (ref 0–1.9)
BILIRUB SERPL-MCNC: 0.3 MG/DL (ref 0.1–1)
BILIRUB SERPL-MCNC: 0.4 MG/DL (ref 0.2–1.2)
BNP SERPL-MCNC: 17 PG/ML
BNP SERPL-MCNC: 83 PG/ML (ref 0–99)
BUN SERPL-MCNC: 19 MG/DL (ref 7–25)
BUN SERPL-MCNC: 19 MG/DL (ref 8–23)
BUN/CREAT SERPL: ABNORMAL (CALC) (ref 6–22)
CALCIUM SERPL-MCNC: 10.1 MG/DL (ref 8.7–10.5)
CALCIUM SERPL-MCNC: 9.5 MG/DL (ref 8.6–10.4)
CHLORIDE SERPL-SCNC: 103 MMOL/L (ref 95–110)
CHLORIDE SERPL-SCNC: 106 MMOL/L (ref 98–110)
CHOLEST SERPL-MCNC: 191 MG/DL
CHOLEST/HDLC SERPL: 2.2 (CALC)
CO2 SERPL-SCNC: 27 MMOL/L (ref 23–29)
CO2 SERPL-SCNC: 30 MMOL/L (ref 20–32)
CREAT SERPL-MCNC: 0.7 MG/DL (ref 0.5–1.4)
CREAT SERPL-MCNC: 0.8 MG/DL (ref 0.6–1)
D DIMER PPP FEU-MCNC: 1.03 MCG/ML FEU
DIFFERENTIAL METHOD BLD: NORMAL
EGFR: 76 ML/MIN/1.73M2
EOSINOPHIL # BLD AUTO: 0.2 K/UL (ref 0–0.5)
EOSINOPHIL # BLD AUTO: 170 CELLS/UL (ref 15–500)
EOSINOPHIL NFR BLD AUTO: 2.4 %
EOSINOPHIL NFR BLD: 1.9 % (ref 0–8)
ERYTHROCYTE [DISTWIDTH] IN BLOOD BY AUTOMATED COUNT: 13.6 % (ref 11–15)
ERYTHROCYTE [DISTWIDTH] IN BLOOD BY AUTOMATED COUNT: 14.1 % (ref 11.5–14.5)
EST. GFR  (NO RACE VARIABLE): >60 ML/MIN/1.73 M^2
GLOBULIN SER CALC-MCNC: 2.7 G/DL (CALC) (ref 1.9–3.7)
GLUCOSE SERPL-MCNC: 102 MG/DL (ref 65–99)
GLUCOSE SERPL-MCNC: 96 MG/DL (ref 70–110)
HBA1C MFR BLD: 5.6 % OF TOTAL HGB
HCT VFR BLD AUTO: 39.4 % (ref 37–48.5)
HCT VFR BLD AUTO: 41.8 % (ref 35–45)
HDLC SERPL-MCNC: 85 MG/DL
HGB BLD-MCNC: 12.7 G/DL (ref 12–16)
HGB BLD-MCNC: 13 G/DL (ref 11.7–15.5)
IMM GRANULOCYTES # BLD AUTO: 0.03 K/UL (ref 0–0.04)
IMM GRANULOCYTES NFR BLD AUTO: 0.4 % (ref 0–0.5)
LDLC SERPL CALC-MCNC: 90 MG/DL (CALC)
LYMPHOCYTES # BLD AUTO: 1917 CELLS/UL (ref 850–3900)
LYMPHOCYTES # BLD AUTO: 2.3 K/UL (ref 1–4.8)
LYMPHOCYTES NFR BLD AUTO: 27 %
LYMPHOCYTES NFR BLD: 29.4 % (ref 18–48)
MCH RBC QN AUTO: 27.5 PG (ref 27–33)
MCH RBC QN AUTO: 27.8 PG (ref 27–31)
MCHC RBC AUTO-ENTMCNC: 31.1 G/DL (ref 32–36)
MCHC RBC AUTO-ENTMCNC: 32.2 G/DL (ref 32–36)
MCV RBC AUTO: 86 FL (ref 82–98)
MCV RBC AUTO: 88.4 FL (ref 80–100)
MONOCYTES # BLD AUTO: 0.6 K/UL (ref 0.3–1)
MONOCYTES # BLD AUTO: 511 CELLS/UL (ref 200–950)
MONOCYTES NFR BLD AUTO: 7.2 %
MONOCYTES NFR BLD: 7.8 % (ref 4–15)
NEUTROPHILS # BLD AUTO: 4.7 K/UL (ref 1.8–7.7)
NEUTROPHILS # BLD AUTO: 4438 CELLS/UL (ref 1500–7800)
NEUTROPHILS NFR BLD AUTO: 62.5 %
NEUTROPHILS NFR BLD: 59.7 % (ref 38–73)
NONHDLC SERPL-MCNC: 106 MG/DL (CALC)
NRBC BLD-RTO: 0 /100 WBC
PLATELET # BLD AUTO: 274 K/UL (ref 150–450)
PLATELET # BLD AUTO: 275 THOUSAND/UL (ref 140–400)
PMV BLD AUTO: 11.6 FL (ref 9.2–12.9)
PMV BLD REES-ECKER: 12.6 FL (ref 7.5–12.5)
POTASSIUM SERPL-SCNC: 4.2 MMOL/L (ref 3.5–5.1)
POTASSIUM SERPL-SCNC: 4.4 MMOL/L (ref 3.5–5.3)
PROT SERPL-MCNC: 7 G/DL (ref 6.1–8.1)
PROT SERPL-MCNC: 7.6 G/DL (ref 6–8.4)
RBC # BLD AUTO: 4.57 M/UL (ref 4–5.4)
RBC # BLD AUTO: 4.73 MILLION/UL (ref 3.8–5.1)
SODIUM SERPL-SCNC: 138 MMOL/L (ref 136–145)
SODIUM SERPL-SCNC: 145 MMOL/L (ref 135–146)
TRIGL SERPL-MCNC: 69 MG/DL
TROPONIN I SERPL HS-MCNC: 8.7 PG/ML (ref 0–14.9)
TROPONIN I SERPL-MCNC: 4 NG/L
TSH SERPL-ACNC: 1.3 MIU/L (ref 0.4–4.5)
WBC # BLD AUTO: 7.1 THOUSAND/UL (ref 3.8–10.8)
WBC # BLD AUTO: 7.8 K/UL (ref 3.9–12.7)

## 2025-03-12 PROCEDURE — 93005 ELECTROCARDIOGRAM TRACING: CPT | Performed by: GENERAL PRACTICE

## 2025-03-12 PROCEDURE — 87389 HIV-1 AG W/HIV-1&-2 AB AG IA: CPT | Performed by: EMERGENCY MEDICINE

## 2025-03-12 PROCEDURE — 36415 COLL VENOUS BLD VENIPUNCTURE: CPT | Performed by: NURSE PRACTITIONER

## 2025-03-12 PROCEDURE — 71275 CT ANGIOGRAPHY CHEST: CPT | Mod: TC

## 2025-03-12 PROCEDURE — 99285 EMERGENCY DEPT VISIT HI MDM: CPT | Mod: 25

## 2025-03-12 PROCEDURE — 71275 CT ANGIOGRAPHY CHEST: CPT | Mod: 26,,, | Performed by: RADIOLOGY

## 2025-03-12 PROCEDURE — 80053 COMPREHEN METABOLIC PANEL: CPT | Performed by: NURSE PRACTITIONER

## 2025-03-12 PROCEDURE — 85025 COMPLETE CBC W/AUTO DIFF WBC: CPT | Performed by: NURSE PRACTITIONER

## 2025-03-12 PROCEDURE — 93010 ELECTROCARDIOGRAM REPORT: CPT | Mod: ,,, | Performed by: GENERAL PRACTICE

## 2025-03-12 PROCEDURE — 25000003 PHARM REV CODE 250: Performed by: STUDENT IN AN ORGANIZED HEALTH CARE EDUCATION/TRAINING PROGRAM

## 2025-03-12 PROCEDURE — 25500020 PHARM REV CODE 255: Performed by: NURSE PRACTITIONER

## 2025-03-12 PROCEDURE — 86803 HEPATITIS C AB TEST: CPT | Performed by: EMERGENCY MEDICINE

## 2025-03-12 PROCEDURE — 84484 ASSAY OF TROPONIN QUANT: CPT | Performed by: NURSE PRACTITIONER

## 2025-03-12 PROCEDURE — 83880 ASSAY OF NATRIURETIC PEPTIDE: CPT | Performed by: NURSE PRACTITIONER

## 2025-03-12 RX ADMIN — IOHEXOL 100 ML: 350 INJECTION, SOLUTION INTRAVENOUS at 04:03

## 2025-03-12 RX ADMIN — APIXABAN 10 MG: 5 TABLET, FILM COATED ORAL at 10:03

## 2025-03-12 NOTE — TELEPHONE ENCOUNTER
Spoke with patient regarding recent imaging and lab results. D-Dimer came back abnormal and needs further studies. Replaced the order for CT Chest without Contrast with CTA CHEST (PE Studies).

## 2025-03-12 NOTE — PROGRESS NOTES
CT shows left lower lobe PE.  Called patient, spoke to her over the phone. Instructed her to go to emergency department now. Pt verbalized understanding. States she will go to [facility] now.

## 2025-03-12 NOTE — PROGRESS NOTES
Returned patient's call with questions about new medications. Clarified dietary restrictions while on xarelto. Pt verbalized understanding.

## 2025-03-13 ENCOUNTER — TELEPHONE (OUTPATIENT)
Dept: FAMILY MEDICINE | Facility: CLINIC | Age: 75
End: 2025-03-13
Payer: MEDICARE

## 2025-03-13 LAB
HCV AB SERPL QL IA: NEGATIVE
HIV 1+2 AB+HIV1 P24 AG SERPL QL IA: NEGATIVE
OHS QRS DURATION: 112 MS
OHS QTC CALCULATION: 456 MS

## 2025-03-13 NOTE — DISCHARGE INSTRUCTIONS
Please return to the emergency department if you have new or worsening symptoms.  Follow up with PCP as soon as possible for further evaluation.  You will be started on Eliquis.  He will be given the 1st dose in the emergency department.  Begin taking 10 mg twice daily for the next 7 days.  Following this, take 5 mg twice daily for the next 21 days.  Additional prescriptions should be given by your PCP.    When do I need to call the doctor?   Sudden chest tightness or pain, often sharp or knife-like, when taking a deep breath  Sudden cough, possibly coughing up blood or bloody mucus  Sudden shortness of breath or rapid breathing  Rapid heart rate  Cold, clammy skin and sweating  Blue skin color  Fainting  Anxiety  If you are on blood thinners, call the doctor if you can't stop any bleeding.

## 2025-03-13 NOTE — FIRST PROVIDER EVALUATION
Emergency Department TeleTriage Encounter Note      CHIEF COMPLAINT    Chief Complaint   Patient presents with    Abnormal Ct Scan     Patient sent by PCP for PE. Patient reports SOB at night when lying down.        VITAL SIGNS   Initial Vitals [03/12/25 1826]   BP Pulse Resp Temp SpO2   (!) 158/68 84 18 98.2 °F (36.8 °C) 98 %      MAP       --            ALLERGIES    Review of patient's allergies indicates:  No Known Allergies    PROVIDER TRIAGE NOTE  75 year old female presents to ER after being called by PCP office to go to the ER  after discovering PE on CTA chest imaging completed today as an outpatient. She reports ongoing chest pain and Sob for the past 2 weeks. Had outpatient workup and labs done and found to have PE. No hx of PE in the past. Currently denies CP at rest.     AAOx3, respirations even and non- labored, stable vitals, normal coloration of skin, sitting upright in triage chair, appears in no acute distress.          ORDERS  Labs Reviewed   HEPATITIS C ANTIBODY   HIV 1 / 2 ANTIBODY       ED Orders (720h ago, onward)      Start Ordered     Status Ordering Provider    03/12/25 1830 03/12/25 1829  Hepatitis C Antibody  STAT         Ordered GEOVANNY CONTRERAS    03/12/25 1830 03/12/25 1829  HIV 1/2 Ag/Ab (4th Gen)  STAT         Ordered GEOVANNY CONTRERAS              Virtual Visit Note: The provider triage portion of this emergency department evaluation and documentation was performed via [employer], a HIPAA-compliant telemedicine application, in concert with a tele-presenter in the room. A face to face patient evaluation with one of my colleagues will occur once the patient is placed in an emergency department room.      DISCLAIMER: This note was prepared with [employer] voice recognition transcription software. Garbled syntax, mangled pronouns, and other bizarre constructions may be attributed to that software system.

## 2025-03-13 NOTE — TELEPHONE ENCOUNTER
----- Message from Laura sent at 3/13/2025 11:36 AM CDT -----  Contact: pt  Type: Needs Medical Advice Who Called: Zhane Call Back Number:[phone] Information: Requesting a call back regarding pt regarding her Rx. She is reading what to avoid.  Pt was given instruction from ER last night and she is asking for FNP Milena only to call her. Please Advise- Thank you

## 2025-03-13 NOTE — TELEPHONE ENCOUNTER
----- Message from Rubens Tinajero MD sent at 3/12/2025 10:26 PM CDT -----  To er  ABNORMAL refer to specialist for further evaluation  ----- Message -----  From: Interface, Rad Results In  Sent: 3/12/2025   5:08 PM CDT  To: Rubens Tinajero III, MD

## 2025-03-13 NOTE — ED PROVIDER NOTES
Encounter Date: 3/12/2025       History     Chief Complaint   Patient presents with    Abnormal Ct Scan     Patient sent by PCP for PE. Patient reports SOB at night when lying down.      HPI    Olivia Mix is a 75 y.o. female without significant medical problems other than hip replacement that occurred a proximally 1 year ago that presents emergency department for abnormal CT scan.  For the past 2 weeks, patient has complain of shortness of breath with lying down as well as pleuritic chest pain.  Symptoms are intermittent.  Located primarily across her upper chest.  Saw her PCP who ordered outpatient chest x-ray and D-dimer.  Chest x-ray was clear, but D-dimer was elevated.  Ordered follow up CT PE study.  CT PE was notable for small left lower lobe PE.  Patient was contacted and told to come to emergency department for further evaluation.  Not currently complaining of chest pain or shortness of breath.    Review of patient's allergies indicates:  No Known Allergies  Past Medical History:   Diagnosis Date    Colon polyp     Hypercholesterolemia     Thyroid disease      Past Surgical History:   Procedure Laterality Date    AUGMENTATION OF BREAST      breast augmentation      CHEILECTOMY      COLONOSCOPY      10 years ago    HIP REPLACEMENT ARTHROPLASTY Left 12/4/2023    Procedure: ARTHROPLASTY, HIP REPLACEMENT, LEFT;  Surgeon: Boom Gunter MD;  Location: [facility];  Service: Orthopedics;  Laterality: Left;  Scott    HYSTERECTOMY       Family History   Problem Relation Name Age of Onset    Breast cancer Mother  82    No Known Problems Daughter      Melanoma Neg Hx      Colon cancer Neg Hx      Colon polyps Neg Hx      Cystic fibrosis Neg Hx      Esophageal cancer Neg Hx      Liver cancer Neg Hx      Rectal cancer Neg Hx      Stomach cancer Neg Hx      Ulcerative colitis Neg Hx       Social History[1]  Review of Systems   Constitutional:  Negative for fever.   HENT:  Negative for sore throat.    Respiratory:   Positive for shortness of breath. Negative for cough.    Cardiovascular:  Positive for chest pain. Negative for leg swelling.   Gastrointestinal:  Negative for abdominal pain, diarrhea, nausea and vomiting.   Genitourinary:  Negative for dysuria, frequency and hematuria.   Musculoskeletal:  Negative for back pain.   Skin:  Negative for rash.   Neurological:  Negative for dizziness, weakness, numbness and headaches.   Hematological:  Does not bruise/bleed easily.       Physical Exam     Initial Vitals [03/12/25 1826]   BP Pulse Resp Temp SpO2   (!) 158/68 84 18 98.2 °F (36.8 °C) 98 %      MAP       --         Physical Exam    Nursing note and vitals reviewed.  Constitutional: She appears well-developed and well-nourished.   HENT:   Head: Normocephalic and atraumatic.   Eyes: EOM are normal. Pupils are equal, round, and reactive to light.   Neck:   Normal range of motion.  Cardiovascular:  Normal rate, regular rhythm and normal heart sounds.           No murmur heard.  Pulmonary/Chest: Breath sounds normal. No respiratory distress. She has no wheezes. She has no rhonchi. She has no rales.   Abdominal: Abdomen is soft. She exhibits no distension.   Musculoskeletal:         General: Normal range of motion.      Cervical back: Normal range of motion.     Neurological: She is alert and oriented to person, place, and time. She has normal strength. No cranial nerve deficit or sensory deficit. GCS score is 15. GCS eye subscore is 4. GCS verbal subscore is 5. GCS motor subscore is 6.   Skin: Capillary refill takes less than 2 seconds. No rash noted.   Psychiatric: She has a normal mood and affect. Thought content normal.         ED Course   Procedures  Labs Reviewed   CBC W/ AUTO DIFFERENTIAL       Result Value    WBC 7.80      RBC 4.57      Hemoglobin 12.7      Hematocrit 39.4      MCV 86      MCH 27.8      MCHC 32.2      RDW 14.1      Platelets 274      MPV 11.6      Immature Granulocytes 0.4      Gran # (ANC) 4.7       Immature Grans (Abs) 0.03      Lymph # 2.3      Mono # 0.6      Eos # 0.2      Baso # 0.06      nRBC 0      Gran % 59.7      Lymph % 29.4      Mono % 7.8      Eosinophil % 1.9      Basophil % 0.8      Differential Method Automated     COMPREHENSIVE METABOLIC PANEL    Sodium 138      Potassium 4.2      Chloride 103      CO2 27      Glucose 96      BUN 19      Creatinine 0.7      Calcium 10.1      Total Protein 7.6      Albumin 4.6      Total Bilirubin 0.3      Alkaline Phosphatase 64      AST 25      ALT 18      eGFR >60.0      Anion Gap 8     TROPONIN I HIGH SENSITIVITY    Troponin I High Sensitivity 8.7     B-TYPE NATRIURETIC PEPTIDE   B-TYPE NATRIURETIC PEPTIDE    BNP 83     HEPATITIS C ANTIBODY   HIV 1 / 2 ANTIBODY        ECG Results              EKG 12-lead (In process)        Collection Time Result Time QRS Duration OHS QTC Calculation    03/12/25 21:08:04 03/13/25 05:02:09 112 456                     In process by Interface, Lab In [facility] (03/13/25 05:02:16)                   Narrative:    Test Reason : R07.9,    Vent. Rate :  78 BPM     Atrial Rate :  78 BPM     P-R Int : 148 ms          QRS Dur : 112 ms      QT Int : 400 ms       P-R-T Axes :  76  78  76 degrees    QTcB Int : 456 ms    Normal sinus rhythm  Right bundle branch block  Abnormal ECG  No previous ECGs available    Referred By: AAAREFERRAL SELF           Confirmed By:                                   Imaging Results              US Lower Extremity Veins Bilateral (Final result)  Result time 03/12/25 22:07:00   Notes recorded by Rubens Tinajero MD on 3/12/2025 at 10:26 PM CDT  FOLLOW-UP AS RECOMMENDED     Final result by Eulalio Rodriguez MD (03/12/25 22:07:00)                   Impression:      No evidence of deep venous thrombosis in either lower extremity.      Electronically signed by: Eulalio Rodriguez  Date:    03/12/2025  Time:    22:07               Narrative:    EXAMINATION:  US LOWER EXTREMITY VEINS BILATERAL    CLINICAL HISTORY:  Other  pulmonary embolism without acute cor pulmonale    TECHNIQUE:  Duplex and color flow Doppler and dynamic compression was performed of the bilateral lower extremity veins was performed.    COMPARISON:  None    FINDINGS:  Right thigh veins: The common femoral, femoral, popliteal, upper greater saphenous, and deep femoral veins are patent and free of thrombus. The veins are normally compressible and have normal phasic flow and augmentation response.    Right calf veins: The visualized calf veins are patent.    Left thigh veins: The common femoral, femoral, popliteal, upper greater saphenous, and deep femoral veins are patent and free of thrombus. The veins are normally compressible and have normal phasic flow and augmentation response.    Left calf veins: The visualized calf veins are patent.    Miscellaneous: None                                       Medications   apixaban tablet 10 mg (10 mg Oral Given 3/12/25 4206)     Medical Decision Making  Olivia Mix is a 75 y.o. female without significant medical problems other than a hip replacement that occurred a proximally 1 year ago that presents emergency department for abnormal CT scan.  PE seen on outpatient CT.  Vitals were stable.  Afebrile.  Lungs are clear.  Heart sounds within normal limits.  Abdominal exam benign.  No lower extremity edema.  Previous imaging was reviewed.  EKG showed normal sinus rhythm with right bundle-branch block pattern.  QRS is not actually meeting the criteria for right bundle-branch block.  Troponin and BNP within normal limits.  CBC and CMP are unremarkable.  Per history of criteria and pulmonary embolism severity index, patient is low risk at this time.  Vitals have been stable throughout.  Observed in the emergency department for hours without worsening symptoms.  Stable for discharge.  Given 1st dose of Eliquis here.  Given prescription for Eliquis as well as starter pack.  Return precautions given.  Instructed to follow up closely  with PCP.    Amount and/or Complexity of Data Reviewed  Labs: ordered.    Risk  Prescription drug management.               ED Course as of 03/13/25 0759   Wed Mar 12, 2025   2109 Per Hestia criteria and pulmonary embolism severity index, patient is low risk at this time.  We will check labs and DVT study, but if these are unremarkable, patient will likely be discharged home. [NV]      ED Course User Index  [NV] Tylor Patel MD                           Clinical Impression:  Final diagnoses:  [R07.9] Chest pain  [I26.99] Pulmonary embolism          ED Disposition Condition    Discharge Stable          ED Prescriptions       Medication Sig Dispense Start Date End Date Auth. Provider    apixaban (ELIQUIS) 5 mg Tab  (Status: Discontinued) Take 2 tablets (10 mg total) by mouth 2 (two) times daily for 7 days, THEN 1 tablet (5 mg total) 2 (two) times daily for 21 days. 70 tablet 3/12/2025 3/12/2025 Tylor Patel MD    apixaban (ELIQUIS) 5 mg Tab Take 2 tablets (10 mg total) by mouth 2 (two) times daily for 7 days, THEN 1 tablet (5 mg total) 2 (two) times daily for 23 days. 74 tablet 3/12/2025 4/11/2025 Tylor Patel MD          Follow-up Information       Follow up With Specialties Details Why Contact Info Additional Information    [facility] - Emergency Dept Emergency Medicine  As needed, If symptoms worsen [address] [id number] [phone] 1st floor    Rubens Tinajero III, MD Family Medicine Call in 1 day Follow up on ED visit [address]  [address]  [place] [address]  [phone]                  [1]   Social History  Tobacco Use    Smoking status: Never    Smokeless tobacco: Never   Substance Use Topics    Alcohol use: Yes     Comment: social    Drug use: Not Currently        Tylor Patel MD  03/13/25 0884

## 2025-03-17 ENCOUNTER — OFFICE VISIT (OUTPATIENT)
Dept: FAMILY MEDICINE | Facility: CLINIC | Age: 75
End: 2025-03-17
Payer: MEDICARE

## 2025-03-17 VITALS
OXYGEN SATURATION: 99 % | WEIGHT: 137 LBS | DIASTOLIC BLOOD PRESSURE: 58 MMHG | BODY MASS INDEX: 22.02 KG/M2 | HEIGHT: 66 IN | TEMPERATURE: 98 F | HEART RATE: 85 BPM | SYSTOLIC BLOOD PRESSURE: 106 MMHG

## 2025-03-17 DIAGNOSIS — R04.0 EPISTAXIS: ICD-10-CM

## 2025-03-17 DIAGNOSIS — I26.99 ACUTE PULMONARY EMBOLISM, UNSPECIFIED PULMONARY EMBOLISM TYPE, UNSPECIFIED WHETHER ACUTE COR PULMONALE PRESENT: Primary | ICD-10-CM

## 2025-03-17 PROCEDURE — 3288F FALL RISK ASSESSMENT DOCD: CPT | Mod: CPTII,S$GLB,, | Performed by: NURSE PRACTITIONER

## 2025-03-17 PROCEDURE — 1159F MED LIST DOCD IN RCRD: CPT | Mod: CPTII,S$GLB,, | Performed by: NURSE PRACTITIONER

## 2025-03-17 PROCEDURE — 3074F SYST BP LT 130 MM HG: CPT | Mod: CPTII,S$GLB,, | Performed by: NURSE PRACTITIONER

## 2025-03-17 PROCEDURE — 3078F DIAST BP <80 MM HG: CPT | Mod: CPTII,S$GLB,, | Performed by: NURSE PRACTITIONER

## 2025-03-17 PROCEDURE — 1101F PT FALLS ASSESS-DOCD LE1/YR: CPT | Mod: CPTII,S$GLB,, | Performed by: NURSE PRACTITIONER

## 2025-03-17 PROCEDURE — 99999 PR PBB SHADOW E&M-EST. PATIENT-LVL III: CPT | Mod: PBBFAC,,, | Performed by: NURSE PRACTITIONER

## 2025-03-17 PROCEDURE — 99214 OFFICE O/P EST MOD 30 MIN: CPT | Mod: S$GLB,,, | Performed by: NURSE PRACTITIONER

## 2025-03-17 PROCEDURE — 1126F AMNT PAIN NOTED NONE PRSNT: CPT | Mod: CPTII,S$GLB,, | Performed by: NURSE PRACTITIONER

## 2025-03-17 NOTE — PROGRESS NOTES
Subjective:       Patient ID: Olivia Mix is a 75 y.o. female.    Chief Complaint: Follow-up (1 week )    HPI    Ms. Lloyd is a 75 year old female with hx of hypothyroidism, HLD, and OA. Last visit, on 3/10/25, she presented with complaints of intermittent pleuritic chest pain.  D-dimer was ordered and was positive.  She was sent for CT chest which showed small Left upper pooja PE.  She then presented to emergency department and received w/u for PE, had bilateral lower extremity US which were negative. No risk factors for clotting.  Very healthy and active.  Cholesterol and blood pressure well controlled. No recent travel. She is very active.     She is taking Eliquis as prescribed.     She has chronic mild nosebleeds which occur more often in the winter time. She states since starting Eliquis nose bleeds have gotten worse.    Review of patient's allergies indicates:  No Known Allergies  Social Drivers of Health     Tobacco Use: Low Risk  (3/17/2025)    Patient History     Smoking Tobacco Use: Never     Smokeless Tobacco Use: Never     Passive Exposure: Not on file   Alcohol Use: Not on file   Financial Resource Strain: Not on file   Food Insecurity: Not on file   Transportation Needs: Not on file   Physical Activity: Not on file   Stress: No Stress Concern Present (6/25/2020)    [employer] - Occupational Stress Questionnaire     Feeling of Stress : Not at all   Housing Stability: Not on file   Depression: Low Risk  (3/10/2025)    Depression     Last PHQ-4: Flowsheet Data: 0   Utilities: Not on file   Health Literacy: Not on file   Social Isolation: Not on file      Past Medical History:   Diagnosis Date    Colon polyp     Hypercholesterolemia     Thyroid disease       Past Surgical History:   Procedure Laterality Date    AUGMENTATION OF BREAST      breast augmentation      CHEILECTOMY      COLONOSCOPY      10 years ago    HIP REPLACEMENT ARTHROPLASTY Left 12/4/2023    Procedure:  "ARTHROPLASTY, HIP REPLACEMENT, LEFT;  Surgeon: Boom Gunter MD;  Location: [facility];  Service: Orthopedics;  Laterality: Left;  Scott    HYSTERECTOMY        Social History[1]    Current Medications[2]    Review of Systems    Objective:      Vitals:    03/17/25 1358   BP: (!) 106/58   Pulse: 85   Temp: 97.7 °F (36.5 °C)   SpO2: 99%   Weight: 62.1 kg (137 lb)   Height: 5' 6" (1.676 m)      Physical Exam  Constitutional:       Appearance: Normal appearance.   HENT:      Head: Normocephalic and atraumatic.   Eyes:      Conjunctiva/sclera: Conjunctivae normal.   Neck:      Vascular: No carotid bruit.   Cardiovascular:      Rate and Rhythm: Normal rate and regular rhythm.      Heart sounds: Normal heart sounds, S1 normal and S2 normal.   Pulmonary:      Effort: Pulmonary effort is normal. No respiratory distress.      Breath sounds: Normal breath sounds. No wheezing.   Abdominal:      General: There is no distension.      Palpations: There is no mass.      Tenderness: There is no abdominal tenderness.   Musculoskeletal:      Right lower leg: No edema.      Left lower leg: No edema.   Skin:     General: Skin is warm.   Neurological:      Mental Status: She is alert and oriented to person, place, and time.   Psychiatric:         Mood and Affect: Mood normal.         Behavior: Behavior normal.         Assessment:       1. Acute pulmonary embolism, unspecified pulmonary embolism type, unspecified whether acute cor pulmonale present    2. Epistaxis        Plan:       Olivia was seen today for follow-up.    Diagnoses and all orders for this visit:    Acute pulmonary embolism, unspecified pulmonary embolism type, unspecified whether acute cor pulmonale present  -     apixaban (ELIQUIS) 5 mg Tab; Take 1 tablet (5 mg total) by mouth 2 (two) times daily.  -     Ambulatory referral/consult to Hematology / Oncology; Future    Epistaxis   -pt instructed ot take daily zyrtec, apply Ayr saline nasal gel at night to moisturize nostrils. " Can also use humidifier.  Use tampons and ice pack if nose bleed occurs again.        PE:  Pt to continue elequis 5mg bid for at least 3 months. Follow up then. Sent to see hematology for work up of unprovoked PE.     All questions answered about pt's new medications.     F/u 3 months.     Patient verbalized understanding and agrees with plan.               [1]   Social History  Socioeconomic History    Marital status:    [2]   Current Outpatient Medications:     apixaban (ELIQUIS) 5 mg Tab, Take 2 tablets (10 mg total) by mouth 2 (two) times daily for 7 days, THEN 1 tablet (5 mg total) 2 (two) times daily for 23 days., Disp: 74 tablet, Rfl: 0    atorvastatin (LIPITOR) 20 MG tablet, Take 1 tablet (20 mg total) by mouth once daily., Disp: 90 tablet, Rfl: 1    levothyroxine (SYNTHROID) 100 MCG tablet, Take one tab 6 days a week, take 1/2 tab one day a week, Disp: 90 tablet, Rfl: 2    meloxicam (MOBIC) 15 MG tablet, Take 15 mg by mouth., Disp: , Rfl:     multivitamin (ONE DAILY MULTIVITAMIN) per tablet, Take 1 tablet by mouth once daily., Disp: , Rfl:     apixaban (ELIQUIS) 5 mg Tab, Take 1 tablet (5 mg total) by mouth 2 (two) times daily., Disp: 180 tablet, Rfl: 0    co-enzyme Q-10 30 mg capsule, Take 30 mg by mouth 3 (three) times a week. (Patient not taking: Reported on 3/17/2025), Disp: , Rfl:     doxycycline (VIBRAMYCIN) 100 MG Cap, Take 1 capsule (100 mg total) by mouth every 12 (twelve) hours. (Patient not taking: Reported on 3/17/2025), Disp: 20 capsule, Rfl: 1

## 2025-03-18 ENCOUNTER — TELEPHONE (OUTPATIENT)
Facility: CLINIC | Age: 75
End: 2025-03-18
Payer: MEDICARE

## 2025-03-18 ENCOUNTER — PATIENT MESSAGE (OUTPATIENT)
Dept: FAMILY MEDICINE | Facility: CLINIC | Age: 75
End: 2025-03-18
Payer: MEDICARE

## 2025-03-18 DIAGNOSIS — I26.99 ACUTE PULMONARY EMBOLISM, UNSPECIFIED PULMONARY EMBOLISM TYPE, UNSPECIFIED WHETHER ACUTE COR PULMONALE PRESENT: ICD-10-CM

## 2025-03-18 NOTE — TELEPHONE ENCOUNTER
There is no way to get generic apixaban, unfortunately. I will place a referral to pharmacy services and send that prescription to our [facility] and they should be able to help her with the cost.

## 2025-03-18 NOTE — NURSING
Oncology Navigation   Intake  Date of Diagnosis: 03/17/25  Cancer Type: Benign hem  Type of Referral: Internal  Date of Referral: 03/17/25  Initial Nurse Navigator Contact: 03/18/25  Referral to Initial Contact Timeline (days): 1  First Appointment Available: 03/28/25  Appointment Date: 03/28/25  First Available Date vs. Scheduled Date (days): 0  Reason if booked > 7 days after scheduling: Specific provider / access     Treatment                              Acuity      Follow Up  No follow-ups on file.     Pt scheduled for new pt Hematology/Oncology clinic appt as requested in referral received in workqueue. Appt date, time and location reviewed with the pt. No questions at this time.

## 2025-03-18 NOTE — PROGRESS NOTES
Pt messaged asking about alternative ways to obtain Eliquis due to cost. Will refer to pharmacy services and send medication to [facility] to see if she can get assistance with payment of drug.

## 2025-03-18 NOTE — NURSING
Oncology Navigation   Intake  Date of Diagnosis: 03/17/25  Cancer Type: Benign hem  Type of Referral: External  Date of Referral: 03/17/25  Initial Nurse Navigator Contact: 03/18/25  Referral to Initial Contact Timeline (days): 1     Treatment                              Acuity      Follow Up  No follow-ups on file.     This RN Nurse Navigator called the pt to schedule a new patient Hematology/Oncology clinic appointment as requested from the providers referral. A voicemail message was left with a direct number [phone] for the patient to call back to schedule the appointment

## 2025-03-19 ENCOUNTER — PATIENT MESSAGE (OUTPATIENT)
Dept: FAMILY MEDICINE | Facility: CLINIC | Age: 75
End: 2025-03-19
Payer: MEDICARE

## 2025-03-19 ENCOUNTER — TELEPHONE (OUTPATIENT)
Dept: PHARMACY | Facility: CLINIC | Age: 75
End: 2025-03-19
Payer: MEDICARE

## 2025-03-19 ENCOUNTER — TELEPHONE (OUTPATIENT)
Dept: FAMILY MEDICINE | Facility: CLINIC | Age: 75
End: 2025-03-19
Payer: MEDICARE

## 2025-03-19 NOTE — TELEPHONE ENCOUNTER
[facility]  Patient Assistance Program guidelines state patient must demonstrate she is ineligible for Medicare's Low Income Subsidy(LIS)/ Extra Help Program. Patient can apply by calling [phone] or online @ https://secure.[facility].gov/i1020/start.  Please provide a copy of  the determination letter to assigned advocate Cali HARDWICK for application submission     [facility]  Patient Assistance Program guidelines state, patient must demonstrate she has spent a minimum 3% of her household income on prescriptions for the current year.        Patient may be eligible for a Medicare Prescription Payment Plan. Patient has been advised to contact her insurance company for enrollment details.           Sincerely   Cali Helm  Pharmacy Patient Assistance Team

## 2025-03-19 NOTE — TELEPHONE ENCOUNTER
----- Message from Benji Baker sent at 3/19/2025  8:23 AM CDT -----  Regarding: FW: Order for VLADISLAV MORSE of jaysonquis.  ----- Message -----  From: Milena Cobian FNP-C  Sent: 3/18/2025   4:56 PM CDT  To: Pharmacy Patient Assistance Team  Subject: Order for VLADISLAV MORSE

## 2025-03-19 NOTE — TELEPHONE ENCOUNTER
Can you please call patient and inform her of her option to apply for payment assistance. This is to help with her eliquis cost.

## 2025-03-19 NOTE — TELEPHONE ENCOUNTER
----- Message from Rubens Tinajero MD sent at 3/12/2025 10:26 PM CDT -----  FOLLOW-UP AS RECOMMENDED  ----- Message -----  From: Louise, Rad Results In  Sent: 3/12/2025  10:09 PM CDT  To: Rubens Tinajero III, MD

## 2025-03-19 NOTE — LETTER
March 19, 2025    Olivia LÓPEZ Dominguez  [address] [address]             Dear Ms. Mix        My name is Cali Helm.  I am reaching out on behalf of [facility]s Pharmacy Patient Assistance Team after receiving a referral from your Provider inquiring about assistance with your Eliquis. Unfortunately, The Pharmacy Patient Assistance Team is unable to submit your application at this time due to the following reasons       [facility]  Patient Assistance Program guidelines state, patient must demonstrate she is ineligible for Medicare's Low Income Subsidy(LIS)/ Extra Help Program. Apply by calling [phone] or online @ https://secure.[employer].gov/i1020/start.  Please provide a copy of  the determination letter to assigned advocate Cali Volodymyr ASAP for application submission     [facility]  Patient Assistance Program guidelines state patient must demonstrate she has spent a minimum 3% of her household income on prescriptions for the current year.        Patient may be eligible for a Medicare Prescription Payment Plan. Please contact your insurance company for enrollment details.        Sincerely  Cali VAUGHAN @[phone]  Pharmacy Patient Assistance  [address]  [place] [address]  Fax: [phone]  [email or web address]

## 2025-03-24 DIAGNOSIS — Z00.00 ENCOUNTER FOR MEDICARE ANNUAL WELLNESS EXAM: ICD-10-CM

## 2025-03-27 PROBLEM — I26.99 PULMONARY EMBOLI: Status: ACTIVE | Noted: 2025-03-27

## 2025-03-27 LAB
OHS QRS DURATION: 114 MS
OHS QTC CALCULATION: 461 MS

## 2025-03-27 NOTE — PROGRESS NOTES
[facility]  History & Physical    Subjective:      Patient ID:   NAME: Olivia Mix : 1950     75 y.o. female    Referring Doc: Milena Cobian F*/Tanvi  Other Physicians: Ladarius Daniel/Jani; Vicki simmons; Alber NP (GI)         Chief Complaint: Pulm emboli      HPI:  75 y.o. female with diagnosis of pulmonary emboli who has been referred by Milena Cobian F* for evaluation by medical hematology/oncology. She had a bout of acute SOB and chest discomfort at night when laying down. She saw her primary and her D-dimer was found to be  elevated. She had a CTA on 3/12/2025 which was positive for a small left lower lobe pulmonary thromboembolism. Doppler studies of bilateral lower extremities were negative for any DVT's. She is on eliquis at standard dose currently. She denies any excessive bleeding or bruising.     She reports she had a left hip surgery about 14months with Dr Gunter with no problems after the surgery except for some incision site scar tissue.     She is very active and goes to the [employer].      She is retired  . No tobacco or alcohol    Both parents livered into 80/90's - mom had breast and lung ca. Dad  at 91 yo of old age    No prior clot history and no family history of clots                ROS:   GEN: normal without any fever, night sweats or weight loss  HEENT: normal with no HA's, sore throat, stiff neck, changes in vision  CV: normal with no CP, SOB, PND, ELI or orthopnea  PULM: normal with no SOB, cough, hemoptysis, sputum or pleuritic pain  GI: normal with no abdominal pain, nausea, vomiting, constipation, diarrhea, melanotic stools, BRBPR, or hematemesis  : normal with no hematuria, dysuria  BREAST: normal with no mass, discharge, pain  SKIN: normal with no rash, erythema, bruising, or swelling       Past Medical/Surgical History:  Past Medical History:   Diagnosis Date    Colon polyp     Hypercholesterolemia     Pulmonary  "emboli 03/27/2025    Thyroid disease      Past Surgical History:   Procedure Laterality Date    AUGMENTATION OF BREAST      breast augmentation      CHEILECTOMY      COLONOSCOPY      10 years ago    HIP REPLACEMENT ARTHROPLASTY Left 12/4/2023    Procedure: ARTHROPLASTY, HIP REPLACEMENT, LEFT;  Surgeon: Boom Gunter MD;  Location: [facility];  Service: Orthopedics;  Laterality: Left;  Scott    HYSTERECTOMY           Allergies:  Review of patient's allergies indicates:  No Known Allergies    Social/Family History:  Social History[1]  Family History   Problem Relation Name Age of Onset    Breast cancer Mother  82    No Known Problems Daughter      Melanoma Neg Hx      Colon cancer Neg Hx      Colon polyps Neg Hx      Cystic fibrosis Neg Hx      Esophageal cancer Neg Hx      Liver cancer Neg Hx      Rectal cancer Neg Hx      Stomach cancer Neg Hx      Ulcerative colitis Neg Hx           Medications:  Current Medications[2]      Pathology:   Cancer Staging   No matching staging information was found for the patient.        Objective:   Vitals:  Blood pressure 137/63, pulse 75, temperature 97 °F (36.1 °C), temperature source Temporal, resp. rate 16, height 5' 6" (1.676 m), weight 62 kg (136 lb 11.2 oz), SpO2 98%.    Physical Examination:   GEN: no apparent distress, comfortable; AAOx3; very fit looking   HEAD: atraumatic and normocephalic  EYES: no pallor, no icterus, PERRLA  ENT: OMM, no pharyngeal erythema, external ears WNL; no nasal discharge; no thrush  NECK: no masses, thyroid normal, trachea midline, no LAD/LN's, supple  CV: RRR with no murmur; normal pulse; normal S1 and S2; no pedal edema  CHEST: Normal respiratory effort; CTAB; normal breath sounds; no wheeze or crackles  ABDOM: nontender and nondistended; soft; normal bowel sounds; no rebound/guarding  MUSC/Skeletal: ROM normal; no crepitus; joints normal; no deformities or arthropathy; s/p left hip surgery  EXTREM: no clubbing, cyanosis, inflammation or " swelling  SKIN: no rashes, lesions, ulcers, petechiae or subcutaneous nodules  : no botello  NEURO: grossly intact; motor/sensory WNL; AAOx3; no tremors  PSYCH: normal mood, affect and behavior  LYMPH: normal cervical, supraclavicular, axillary and groin LN's      Labs:   Lab Results   Component Value Date    WBC 7.80 03/12/2025    HGB 12.7 03/12/2025    HCT 39.4 03/12/2025    MCV 86 03/12/2025     03/12/2025    CMP  Sodium   Date Value Ref Range Status   03/12/2025 138 136 - 145 mmol/L Final   12/26/2018 141 134 - 144 mmol/L      Potassium   Date Value Ref Range Status   03/12/2025 4.2 3.5 - 5.1 mmol/L Final     Chloride   Date Value Ref Range Status   03/12/2025 103 95 - 110 mmol/L Final   12/26/2018 105 98 - 110 mmol/L      CO2   Date Value Ref Range Status   03/12/2025 27 23 - 29 mmol/L Final     Glucose   Date Value Ref Range Status   03/12/2025 96 70 - 110 mg/dL Final   12/26/2018 97 70 - 99 mg/dL      BUN   Date Value Ref Range Status   03/12/2025 19 8 - 23 mg/dL Final     Creatinine   Date Value Ref Range Status   03/12/2025 0.7 0.5 - 1.4 mg/dL Final   12/26/2018 0.65 0.60 - 1.40 mg/dL      Calcium   Date Value Ref Range Status   03/12/2025 10.1 8.7 - 10.5 mg/dL Final     Total Protein   Date Value Ref Range Status   03/12/2025 7.6 6.0 - 8.4 g/dL Final     Albumin   Date Value Ref Range Status   03/12/2025 4.6 3.5 - 5.2 g/dL Final   12/26/2018 3.6 3.1 - 4.7 g/dL      Total Bilirubin   Date Value Ref Range Status   03/12/2025 0.3 0.1 - 1.0 mg/dL Final     Comment:     For infants and newborns, interpretation of results should be based  on gestational age, weight and in agreement with clinical  observations.    Premature Infant recommended reference ranges:  Up to 24 hours.............<8.0 mg/dL  Up to 48 hours............<12.0 mg/dL  3-5 days..................<15.0 mg/dL  6-29 days.................<15.0 mg/dL       Alkaline Phosphatase   Date Value Ref Range Status   03/12/2025 64 55 - 135 U/L Final      AST   Date Value Ref Range Status   03/12/2025 25 10 - 40 U/L Final     ALT   Date Value Ref Range Status   03/12/2025 18 10 - 44 U/L Final     Anion Gap   Date Value Ref Range Status   03/12/2025 8 8 - 16 mmol/L Final     eGFR if    Date Value Ref Range Status   10/20/2021 93 > OR = 60 mL/min/1.73m2 Final     eGFR if non    Date Value Ref Range Status   10/20/2021 80 > OR = 60 mL/min/1.73m2 Final         Radiology/Diagnostic Studies:    US doppler 3/12/2025:  Impression:     No evidence of deep venous thrombosis in either lower extremity.       CTA 3/12/2025:   Impression:     1. Positive for small left lower lobe pulmonary thromboembolism.  Attempts are currently being made to reach the ordering provider with these findings.  2. Focal left lower lobe bronchiolitis, with additional scattered nonspecific lower lobe interstitial opacities suggesting subsegmental atelectasis and or small airways inflammation.     Mammo 8/23/2024:  Impression:     Negative  mammogram. Annual screening mammography is recommended.     BI-RADS CATEGORY 1: NEGATIVE.      All lab results and imaging results have been reviewed and discussed with the patient    Assessment:   (1) 75 y.o. female  with diagnosis of pulmonary emboli who has been referred by Milena Cobian F* for evaluation by medical hematology/oncology. She had a bout of acute SOB and chest discomfort at night when laying down. She saw her primary and her D-dimer was found to be  elevated. She had a CTA on 3/12/2025 which was positive for a small left lower lobe pulmonary thromboembolism. Doppler studies of bilateral lower extremities were negative for any DVT's. She is on eliquis at standard dose currently. She denies any excessive bleeding or bruising.     She reports she had a left hip surgery about 14months with Dr Gunter with no problems after the surgery except for some incision site scar tissue.       (2)  Hypercholesterolemia    (3) Hypothyroidism    (4) Osteoarthritis in bilat knees; hx/of left hip replacement surgery in Dec 2023    (5) Hx/of recurrent UTI        VISIT DIAGNOSES:     Other acute pulmonary embolism, unspecified whether acute cor pulmonale present    Acute pulmonary embolism, unspecified pulmonary embolism type, unspecified whether acute cor pulmonale present  -     Ambulatory referral/consult to Hematology / Oncology          Plan:     PLAN:  1. Order clot workup   2. Continue eliquis for now and at least for the next 6 months  3. Order US abdom for completeness sake   4. Mammo yearly  5. F/u with PCP, etc    RTC in  6 weeks   Fax note to Milena Cobian F*/Tanvi; Finger        I have explained and the patient understands all of  the current recommendation(s). I have answered all of their questions to the best of my ability and to their complete satisfaction.     Anticoagulation Discussion:    Discussed with patient and any applicable family members about the benefit and/or need for anticoagulation. Communicated about the risks of bleeding while on any anticoagulation, which could be serious and/or life-threatening, and which can occur at any time, regardless of degree of the level of anticoagulation. Expressed the need for compliance with any anticoagulation regimen and that failure to do so could potential lead to excessive bleeding, and risk to health and/or life. In particular, with patients on coumadin therapy, compliance with requested blood work is absolutely essential, as coumadin levels can vary from time to time, and failure to do so could potentially place the patient at risk for bleeding and/or clotting events which could be fatal. Patients on coumadin are encouraged to call the day after they have their levels drawn, as to obtain the appropriate instructions from our staff. Patients are aware that self-regulating or self-dosing of their medications is strictly prohibited.          Thank you for allowing me to participate in this patient's care. Please call with any questions or concerns.    Electronically signed Cory Wilcox MD         [1]   Social History  Socioeconomic History    Marital status:    Tobacco Use    Smoking status: Never    Smokeless tobacco: Never   Substance and Sexual Activity    Alcohol use: Yes     Comment: social    Drug use: Not Currently     Social Drivers of Health     Stress: No Stress Concern Present (6/25/2020)    [employer] - Occupational Stress Questionnaire     Feeling of Stress : Not at all   [2]   Current Outpatient Medications:     apixaban (ELIQUIS) 5 mg Tab, Take 1 tablet (5 mg total) by mouth 2 (two) times daily., Disp: 180 tablet, Rfl: 0    atorvastatin (LIPITOR) 20 MG tablet, Take 1 tablet (20 mg total) by mouth once daily., Disp: 90 tablet, Rfl: 1    levothyroxine (SYNTHROID) 100 MCG tablet, Take one tab 6 days a week, take 1/2 tab one day a week, Disp: 90 tablet, Rfl: 2    meloxicam (MOBIC) 15 MG tablet, Take 15 mg by mouth., Disp: , Rfl:     multivitamin (ONE DAILY MULTIVITAMIN) per tablet, Take 1 tablet by mouth once daily., Disp: , Rfl:     co-enzyme Q-10 30 mg capsule, Take 30 mg by mouth 3 (three) times a week. (Patient not taking: Reported on 3/28/2025), Disp: , Rfl:     doxycycline (VIBRAMYCIN) 100 MG Cap, Take 1 capsule (100 mg total) by mouth every 12 (twelve) hours. (Patient not taking: Reported on 3/10/2025), Disp: 20 capsule, Rfl: 1

## 2025-03-28 ENCOUNTER — OFFICE VISIT (OUTPATIENT)
Facility: CLINIC | Age: 75
End: 2025-03-28
Payer: MEDICARE

## 2025-03-28 VITALS
HEIGHT: 66 IN | SYSTOLIC BLOOD PRESSURE: 137 MMHG | WEIGHT: 136.69 LBS | TEMPERATURE: 97 F | DIASTOLIC BLOOD PRESSURE: 63 MMHG | OXYGEN SATURATION: 98 % | BODY MASS INDEX: 21.97 KG/M2 | RESPIRATION RATE: 16 BRPM | HEART RATE: 75 BPM

## 2025-03-28 DIAGNOSIS — I26.99 ACUTE PULMONARY EMBOLISM, UNSPECIFIED PULMONARY EMBOLISM TYPE, UNSPECIFIED WHETHER ACUTE COR PULMONALE PRESENT: ICD-10-CM

## 2025-03-28 DIAGNOSIS — E53.8 DEFICIENCY OF OTHER SPECIFIED B GROUP VITAMINS: ICD-10-CM

## 2025-03-28 DIAGNOSIS — I82.890 ACUTE EMBOLISM AND THROMBOSIS OF OTHER SPECIFIED VEINS: ICD-10-CM

## 2025-03-28 DIAGNOSIS — I26.99 OTHER ACUTE PULMONARY EMBOLISM, UNSPECIFIED WHETHER ACUTE COR PULMONALE PRESENT: Primary | ICD-10-CM

## 2025-03-28 PROCEDURE — 99999 PR PBB SHADOW E&M-EST. PATIENT-LVL IV: CPT | Mod: PBBFAC,,, | Performed by: INTERNAL MEDICINE

## 2025-03-30 LAB
OHS QRS DURATION: 112 MS
OHS QTC CALCULATION: 456 MS

## 2025-04-01 ENCOUNTER — RESULTS FOLLOW-UP (OUTPATIENT)
Dept: FAMILY MEDICINE | Facility: CLINIC | Age: 75
End: 2025-04-01

## 2025-04-01 ENCOUNTER — HOSPITAL ENCOUNTER (OUTPATIENT)
Dept: RADIOLOGY | Facility: HOSPITAL | Age: 75
Discharge: HOME | End: 2025-04-01
Attending: INTERNAL MEDICINE
Payer: MEDICARE

## 2025-04-01 ENCOUNTER — TELEPHONE (OUTPATIENT)
Facility: CLINIC | Age: 75
End: 2025-04-01
Payer: MEDICARE

## 2025-04-01 DIAGNOSIS — I26.99 OTHER ACUTE PULMONARY EMBOLISM, UNSPECIFIED WHETHER ACUTE COR PULMONALE PRESENT: ICD-10-CM

## 2025-04-01 DIAGNOSIS — I26.99 ACUTE PULMONARY EMBOLISM, UNSPECIFIED PULMONARY EMBOLISM TYPE, UNSPECIFIED WHETHER ACUTE COR PULMONALE PRESENT: ICD-10-CM

## 2025-04-01 PROCEDURE — 76700 US EXAM ABDOM COMPLETE: CPT | Mod: 26,,, | Performed by: RADIOLOGY

## 2025-04-01 PROCEDURE — 76700 US EXAM ABDOM COMPLETE: CPT | Mod: TC,PO

## 2025-04-04 ENCOUNTER — TELEPHONE (OUTPATIENT)
Facility: CLINIC | Age: 75
End: 2025-04-04
Payer: MEDICARE

## 2025-04-04 NOTE — TELEPHONE ENCOUNTER
----- Message from Connie sent at 4/3/2025  2:43 PM CDT -----  The patient called again about her labs that need to be done at [facility]. She spoke to Mora yesterday and she was going to call [facility] to see why they told her they could not do the labs without speaking to Dr. Wilcox. She would like a call back when Mora gets an answer.

## 2025-04-04 NOTE — TELEPHONE ENCOUNTER
Tried multiple times over the last couple of days to get in touch with [facility], where patient tried to have labs drawn at, but have been unable to get in touch with them. I spoke to Kd at [facility], made aware patient was turned away when she attempted to have labs drawn as she was told labs needed prior auth and verbal ok to draw the labs ordered. Kd states the MTHFR lab that was ordered requires a paper filled out by the ordering MD that they would like this lab drawn and he will fax this to me and once complete by MD it will need to be faxed to the local location where patient gets lab drawn at [phone]. I inquired if any of the ordered labs require a PA from insurance, he states they do not. Spoke to patient made aware of above and that we will review, have Dr Wilcox sign and fax back once received. She also requested that I leave a copy of this at the  of Dr Wilcox's office and call her when done so she can  a copy to bring with her just incase.

## 2025-04-07 ENCOUNTER — OFFICE VISIT (OUTPATIENT)
Dept: DERMATOLOGY | Facility: CLINIC | Age: 75
End: 2025-04-07
Payer: MEDICARE

## 2025-04-07 DIAGNOSIS — D18.01 CHERRY ANGIOMA: ICD-10-CM

## 2025-04-07 DIAGNOSIS — D22.9 MULTIPLE BENIGN NEVI: ICD-10-CM

## 2025-04-07 DIAGNOSIS — L57.8 ACTINIC SKIN DAMAGE: ICD-10-CM

## 2025-04-07 DIAGNOSIS — L81.4 LENTIGO: ICD-10-CM

## 2025-04-07 DIAGNOSIS — L82.1 SEBORRHEIC KERATOSIS: ICD-10-CM

## 2025-04-07 DIAGNOSIS — L57.0 AK (ACTINIC KERATOSIS): Primary | ICD-10-CM

## 2025-04-07 NOTE — PROGRESS NOTES
Subjective:      Patient ID:  Olivia Mix is a 75 y.o. female who presents for   Chief Complaint   Patient presents with    Spot     Multiple      LOV 4/23/24    Patient here today for skin check TBSE  Patient states she has multiple spots, none are bothersome.    Denies Phx of NMSC  Denies Fhx of MM          Review of Systems   Constitutional:  Negative for fever, chills and fatigue.   Respiratory:  Negative for cough and shortness of breath.    Gastrointestinal:  Negative for nausea and vomiting.   Skin:  Positive for daily sunscreen use and activity-related sunscreen use. Negative for wears hat.   Hematologic/Lymphatic: Bruises/bleeds easily (Eliquis).       Objective:   Physical Exam   Constitutional: She appears well-developed and well-nourished. No distress.   Neurological: She is alert and oriented to person, place, and time. She is not disoriented.   Psychiatric: She has a normal mood and affect.   Skin:   Areas Examined (abnormalities noted in diagram):   Scalp / Hair Palpated and Inspected  Head / Face Inspection Performed  Neck Inspection Performed  Chest / Axilla Inspection Performed  Abdomen Inspection Performed  Genitals / Buttocks / Groin Inspection Performed  Back Inspection Performed  RUE Inspected  LUE Inspection Performed  RLE Inspected  LLE Inspection Performed  Nails and Digits Inspection Performed                     Diagram Legend     Erythematous scaling macule/papule c/w actinic keratosis       Vascular papule c/w angioma      Pigmented verrucoid papule/plaque c/w seborrheic keratosis      Yellow umbilicated papule c/w sebaceous hyperplasia      Irregularly shaped tan macule c/w lentigo     1-2 mm smooth white papules consistent with Milia      Movable subcutaneous cyst with punctum c/w epidermal inclusion cyst      Subcutaneous movable cyst c/w pilar cyst      Firm pink to brown papule c/w dermatofibroma      Pedunculated fleshy papule(s) c/w skin tag(s)      Evenly pigmented macule c/w  junctional nevus     Mildly variegated pigmented, slightly irregular-bordered macule c/w mildly atypical nevus      Flesh colored to evenly pigmented papule c/w intradermal nevus       Pink pearly papule/plaque c/w basal cell carcinoma      Erythematous hyperkeratotic cursted plaque c/w SCC      Surgical scar with no sign of skin cancer recurrence      Open and closed comedones      Inflammatory papules and pustules      Verrucoid papule consistent consistent with wart     Erythematous eczematous patches and plaques     Dystrophic onycholytic nail with subungual debris c/w onychomycosis     Umbilicated papule    Erythematous-base heme-crusted tan verrucoid plaque consistent with inflamed seborrheic keratosis     Erythematous Silvery Scaling Plaque c/w Psoriasis     See annotation      Assessment / Plan:        AK (actinic keratosis)  Cryosurgery Procedure Note    Verbal consent from the patient is obtained and the patient is aware of the precancerous quality and need for treatment of these lesions. Liquid nitrogen cryosurgery is applied to the 2 actinic keratoses, as detailed in the physical exam, to produce a freeze injury. The patient is aware that blisters may form and is instructed on wound care with gentle cleansing and use of vaseline ointment to keep moist until healed. The patient is supplied a handout on cryosurgery and is instructed to call if lesions do not completely resolve.    Actinic skin damage  Total body skin examination performed today including at least 12 points as noted in physical examination. No lesions suspicious for malignancy noted.  Patient instructed in importance in daily broad spectrum sun protection of at least spf 30. Mineral sunscreen ingredients preferred (Zinc +/- Titanium) and can be found OTC.   Patient encouraged to wear hat for all outdoor exposure.   Also discussed sun avoidance and use of protective clothing.    Seborrheic keratosis  These are benign inherited growths  without a malignant potential. Reassurance given to patient. No treatment is necessary.     Cherry angioma  This is a benign vascular lesion. Reassurance given. No treatment required.       Multiple benign nevi  Careful dermoscopy evaluation of nevi performed with none identified as needing biopsy today  Monitor for new mole or moles that are becoming bigger, darker, irritated, or developing irregular borders.     Lentigo  This is a benign hyperpigmented sun induced lesion. Daily sun protection will reduce the number of new lesions. Treatment of these benign lesions are considered cosmetic.           No follow-ups on file.

## 2025-04-09 ENCOUNTER — TELEPHONE (OUTPATIENT)
Facility: CLINIC | Age: 75
End: 2025-04-09
Payer: MEDICARE

## 2025-04-09 DIAGNOSIS — I26.99 OTHER ACUTE PULMONARY EMBOLISM, UNSPECIFIED WHETHER ACUTE COR PULMONALE PRESENT: ICD-10-CM

## 2025-04-09 DIAGNOSIS — I82.890 ACUTE EMBOLISM AND THROMBOSIS OF OTHER SPECIFIED VEINS: ICD-10-CM

## 2025-04-09 DIAGNOSIS — I26.99 ACUTE PULMONARY EMBOLISM, UNSPECIFIED PULMONARY EMBOLISM TYPE, UNSPECIFIED WHETHER ACUTE COR PULMONALE PRESENT: ICD-10-CM

## 2025-04-09 DIAGNOSIS — E53.8 DEFICIENCY OF OTHER SPECIFIED B GROUP VITAMINS: ICD-10-CM

## 2025-04-09 NOTE — TELEPHONE ENCOUNTER
Spoke to Odalis with [employer], informed that I received MD consent form but I was previously told that PA was not required and that I need to know which labs are requiring a PA. She placed me on hold and informed me that she can not provide me with the info on which labs require PA and that I will need to call the genetics dept at [phone] and will have to make them aware of every lab ordered with this order as they can not see what is ordered and once that is done they should be able to tell me which labs require PA. I also made aware that after review of MD consent form it states that we need to obtain signed consent from patient to order genetic test and states this includes the factor 5 leiden test that was ordered. I inquired as to if this was a new process as we have never had to get MD consent or signed consent for labs from patients. She states this has been their practice since she has been employed by [employer] which has been for some time. Per Dr Wilcox, I informed Connie, manager, that I need to discuss this with her to see what needs to be done in this case. Attempted to call patient to make aware that I have received her message and trying to get issue with [employer] taken care of, no answer LVM. Connei made aware of above, provided with MRN # and copy of MD consent form. States she will reach out to [employer] to discuss and will let me know once she has update.

## 2025-04-09 NOTE — TELEPHONE ENCOUNTER
----- Message from Olimpia sent at 4/8/2025 10:06 AM CDT -----  Regarding: [facility] to you  Archie [phone], as you were speaking regarding needs for this patientRegarding pre-auth form; will have to contact insurance before adding the tests needed.Also send a PAIC form, for genetic testing.CB as needed

## 2025-04-14 ENCOUNTER — RESULTS FOLLOW-UP (OUTPATIENT)
Dept: FAMILY MEDICINE | Facility: CLINIC | Age: 75
End: 2025-04-14

## 2025-04-14 ENCOUNTER — HOSPITAL ENCOUNTER (OUTPATIENT)
Dept: RADIOLOGY | Facility: HOSPITAL | Age: 75
Discharge: HOME | End: 2025-04-14
Attending: PHYSICIAN ASSISTANT
Payer: MEDICARE

## 2025-04-14 ENCOUNTER — TELEPHONE (OUTPATIENT)
Facility: CLINIC | Age: 75
End: 2025-04-14
Payer: MEDICARE

## 2025-04-14 ENCOUNTER — OFFICE VISIT (OUTPATIENT)
Dept: ORTHOPEDICS | Facility: CLINIC | Age: 75
End: 2025-04-14
Payer: MEDICARE

## 2025-04-14 VITALS
DIASTOLIC BLOOD PRESSURE: 62 MMHG | WEIGHT: 136.69 LBS | BODY MASS INDEX: 21.97 KG/M2 | HEIGHT: 66 IN | SYSTOLIC BLOOD PRESSURE: 122 MMHG

## 2025-04-14 DIAGNOSIS — I82.890 ACUTE EMBOLISM AND THROMBOSIS OF OTHER SPECIFIED VEINS: ICD-10-CM

## 2025-04-14 DIAGNOSIS — I26.99 OTHER ACUTE PULMONARY EMBOLISM, UNSPECIFIED WHETHER ACUTE COR PULMONALE PRESENT: ICD-10-CM

## 2025-04-14 DIAGNOSIS — M17.11 PRIMARY OSTEOARTHRITIS OF RIGHT KNEE: ICD-10-CM

## 2025-04-14 DIAGNOSIS — I26.99 ACUTE PULMONARY EMBOLISM, UNSPECIFIED PULMONARY EMBOLISM TYPE, UNSPECIFIED WHETHER ACUTE COR PULMONALE PRESENT: ICD-10-CM

## 2025-04-14 DIAGNOSIS — M17.12 PRIMARY OSTEOARTHRITIS OF LEFT KNEE: Primary | ICD-10-CM

## 2025-04-14 DIAGNOSIS — E53.8 DEFICIENCY OF OTHER SPECIFIED B GROUP VITAMINS: ICD-10-CM

## 2025-04-14 PROCEDURE — 73562 X-RAY EXAM OF KNEE 3: CPT | Mod: TC,50,PN

## 2025-04-14 PROCEDURE — 1101F PT FALLS ASSESS-DOCD LE1/YR: CPT | Mod: CPTII,S$GLB,, | Performed by: PHYSICIAN ASSISTANT

## 2025-04-14 PROCEDURE — 73562 X-RAY EXAM OF KNEE 3: CPT | Mod: 26,50,, | Performed by: RADIOLOGY

## 2025-04-14 PROCEDURE — 3288F FALL RISK ASSESSMENT DOCD: CPT | Mod: CPTII,S$GLB,, | Performed by: PHYSICIAN ASSISTANT

## 2025-04-14 PROCEDURE — 99999 PR PBB SHADOW E&M-EST. PATIENT-LVL III: CPT | Mod: PBBFAC,,, | Performed by: PHYSICIAN ASSISTANT

## 2025-04-14 PROCEDURE — 1125F AMNT PAIN NOTED PAIN PRSNT: CPT | Mod: CPTII,S$GLB,, | Performed by: PHYSICIAN ASSISTANT

## 2025-04-14 PROCEDURE — 1159F MED LIST DOCD IN RCRD: CPT | Mod: CPTII,S$GLB,, | Performed by: PHYSICIAN ASSISTANT

## 2025-04-14 PROCEDURE — 1160F RVW MEDS BY RX/DR IN RCRD: CPT | Mod: CPTII,S$GLB,, | Performed by: PHYSICIAN ASSISTANT

## 2025-04-14 PROCEDURE — 99213 OFFICE O/P EST LOW 20 MIN: CPT | Mod: 25,S$GLB,, | Performed by: PHYSICIAN ASSISTANT

## 2025-04-14 PROCEDURE — 20610 DRAIN/INJ JOINT/BURSA W/O US: CPT | Mod: 50,S$GLB,, | Performed by: PHYSICIAN ASSISTANT

## 2025-04-14 PROCEDURE — 3074F SYST BP LT 130 MM HG: CPT | Mod: CPTII,S$GLB,, | Performed by: PHYSICIAN ASSISTANT

## 2025-04-14 PROCEDURE — 3078F DIAST BP <80 MM HG: CPT | Mod: CPTII,S$GLB,, | Performed by: PHYSICIAN ASSISTANT

## 2025-04-14 RX ORDER — TRIAMCINOLONE ACETONIDE 40 MG/ML
40 INJECTION, SUSPENSION INTRA-ARTICULAR; INTRAMUSCULAR
Status: DISCONTINUED | OUTPATIENT
Start: 2025-04-14 | End: 2025-04-14 | Stop reason: HOSPADM

## 2025-04-14 RX ADMIN — TRIAMCINOLONE ACETONIDE 40 MG: 40 INJECTION, SUSPENSION INTRA-ARTICULAR; INTRAMUSCULAR at 01:04

## 2025-04-14 NOTE — PROGRESS NOTES
[facility]  [address]  [place] [address]  Phone: [phone]   [phone]    Patient's PCP:Rubens Tinajero III, MD  Referring Provider: No ref. provider found    POST-OP Note:    Subjective:        Chief Complaint:   Chief Complaint   Patient presents with    Left Knee - Pain     Last injection 01/03/25 did work but didn't work as long as the inejctions have before, would like more injections today; pain is hard with exercise;  pt is on eliquis     Right Knee - Pain       Past Medical History:   Diagnosis Date    Colon polyp     Hypercholesterolemia     Pulmonary emboli 03/27/2025    Thyroid disease        Past Surgical History:   Procedure Laterality Date    AUGMENTATION OF BREAST      breast augmentation      CHEILECTOMY      COLONOSCOPY      10 years ago    HIP REPLACEMENT ARTHROPLASTY Left 12/4/2023    Procedure: ARTHROPLASTY, HIP REPLACEMENT, LEFT;  Surgeon: Boom Gunter MD;  Location: [facility];  Service: Orthopedics;  Laterality: Left;  Scott    HYSTERECTOMY         Current Outpatient Medications   Medication Sig    apixaban (ELIQUIS) 5 mg Tab Take 1 tablet (5 mg total) by mouth 2 (two) times daily.    atorvastatin (LIPITOR) 20 MG tablet Take 1 tablet (20 mg total) by mouth once daily.    levothyroxine (SYNTHROID) 100 MCG tablet Take one tab 6 days a week, take 1/2 tab one day a week    multivitamin (ONE DAILY MULTIVITAMIN) per tablet Take 1 tablet by mouth once daily.    co-enzyme Q-10 30 mg capsule Take 30 mg by mouth 3 (three) times a week. (Patient not taking: Reported on 4/14/2025)    doxycycline (VIBRAMYCIN) 100 MG Cap Take 1 capsule (100 mg total) by mouth every 12 (twelve) hours. (Patient not taking: Reported on 3/10/2025)    meloxicam (MOBIC) 15 MG tablet Take 15 mg by mouth. (Patient not taking: Reported on 4/14/2025)     No current facility-administered medications for this visit.       Review of patient's allergies indicates:  No Known Allergies    Family  History   Problem Relation Name Age of Onset    Breast cancer Mother  82    No Known Problems Daughter      Melanoma Neg Hx      Colon cancer Neg Hx      Colon polyps Neg Hx      Cystic fibrosis Neg Hx      Esophageal cancer Neg Hx      Liver cancer Neg Hx      Rectal cancer Neg Hx      Stomach cancer Neg Hx      Ulcerative colitis Neg Hx         Social History[1]    History of present illness: 75 y.o. female returns to clinic today with known osteoarthritis in the Bilateral knee/s.  Here today for routine follow up.    Review of Systems:    Musculoskeletal:  See HPI       Objective:        Physical Examination:    Vital Signs:   Vitals:    04/14/25 1337   BP: 122/62       Body mass index is 22.06 kg/m².    This a well-developed, well nourished patient in no acute distress.  They are alert and oriented and cooperative to examination.        Examination of the knee, skin is dry and intact, no erythema or ecchymosis, no signs and symptoms of infection.  No effusion.  Stable anterior-posterior varus and valgus stress.  Homans sign is negative, straight leg raise is negative, distally neurovascularly intact.    Range of motion: 0-120    Pertinent New Results:       XRAY Report / Interpretation:    AP lateral sunrise views of the bilateral knees taken today in the office demonstrate advanced degenerative changes with valgus deformities of both knees, weight-bearing surface and patellofemoral joint changes.    Procedure/s:    Large Joint Aspiration/Injection: R knee    Date/Time: 4/14/2025 1:45 PM    Performed by: Ladarius Daniel PA  Authorized by: Ladarius Daniel PA    Consent Done?:  Yes (Verbal)  Indications:  Pain  Site marked: the procedure site was marked    Timeout: prior to procedure the correct patient, procedure, and site was verified    Prep: patient was prepped and draped in usual sterile fashion      Local anesthesia used?: Yes    Local anesthetic:  Lidocaine 1% without  epinephrine    Details:  Needle Size:  25 G  Ultrasonic Guidance for needle placement?: No    Location:  Knee  Site:  R knee  Medications:  40 mg triamcinolone acetonide 40 mg/mL  Patient tolerance:  Patient tolerated the procedure well with no immediate complications  Large Joint Aspiration/Injection: L knee    Date/Time: 4/14/2025 1:45 PM    Performed by: Ladarius Daniel PA  Authorized by: Ladarius Daniel PA    Consent Done?:  Yes (Verbal)  Indications:  Pain  Site marked: the procedure site was marked    Timeout: prior to procedure the correct patient, procedure, and site was verified    Prep: patient was prepped and draped in usual sterile fashion      Local anesthesia used?: Yes    Local anesthetic:  Lidocaine 1% without epinephrine    Details:  Needle Size:  25 G  Ultrasonic Guidance for needle placement?: No    Location:  Knee  Site:  L knee  Medications:  40 mg triamcinolone acetonide 40 mg/mL  Patient tolerance:  Patient tolerated the procedure well with no immediate complications            Assessment:       1. Primary osteoarthritis of left knee    2. Primary osteoarthritis of right knee      Plan:     Primary osteoarthritis of left knee  -     X-Ray Knee 3 View Bilateral  -     Large Joint Aspiration/Injection: L knee    Primary osteoarthritis of right knee  -     X-Ray Knee 3 View Bilateral  -     Large Joint Aspiration/Injection: R knee        Follow up if symptoms worsen or fail to improve.    Osteoarthritis knee, we injected Bilateral knee/s today, anterior lateral approach sterile technique patient tolerated well.  We went over postprocedure instructions.    Receiving a steroid injection is a simple, in-office procedure.     After your injection, keep the following in mind:    In the first 48 hours after a steroid injection...    -You should be able to resume your normal day-to-day activities    -You should avoid activities that put excessive strain on your knee such as jogging, lifting or  prolonged standing    -If you have any mild pain or swelling at the injection site, place an ice pack on your knee for 10 minutes or as recommended by your doctor    In the months after an injection...    -Everyone responds differently so when your osteoarthritis knee pain returns, schedule a follow up appointment         ADELINE Hart, PA-C      EMR Statement:  Please note that portions of this patient encounter record were imported from the patients electronic medical record and that others were dictated using voice recognition software. For these reasons grammatical errors, nonsensical language, and apparently contradictory statements may be present.  These should be disregarded or interpreted with respect to the context of the document.       [1]   Social History  Socioeconomic History    Marital status:    Tobacco Use    Smoking status: Never    Smokeless tobacco: Never   Substance and Sexual Activity    Alcohol use: Yes     Comment: social    Drug use: Not Currently     Social Drivers of Health     Stress: No Stress Concern Present (6/25/2020)    [employer] - Occupational Stress Questionnaire     Feeling of Stress : Not at all

## 2025-04-14 NOTE — TELEPHONE ENCOUNTER
After multiple issues with ordering clot workup at [facility] lab and with the help of Connie, who was able to speak to [facility] regarding the issues they were having with the ordered labs and per Connie's directives I instead placed orders to [facility] so that patient can have these drawn at [facility] lab. Attempted to call patient with above and instructions to have labs done at [facility] lab instead of [facility], no answer LVM to return my call to discuss.

## 2025-04-14 NOTE — TELEPHONE ENCOUNTER
Patient made aware of need to have labs done at [facility] lab, verbalized understanding, appt scheduled and orders linked.

## 2025-04-14 NOTE — PROCEDURES
Large Joint Aspiration/Injection: R knee    Date/Time: 4/14/2025 1:45 PM    Performed by: Ladarius Daniel PA  Authorized by: Ladarius Daniel PA    Consent Done?:  Yes (Verbal)  Indications:  Pain  Site marked: the procedure site was marked    Timeout: prior to procedure the correct patient, procedure, and site was verified    Prep: patient was prepped and draped in usual sterile fashion      Local anesthesia used?: Yes    Local anesthetic:  Lidocaine 1% without epinephrine    Details:  Needle Size:  25 G  Ultrasonic Guidance for needle placement?: No    Location:  Knee  Site:  R knee  Medications:  40 mg triamcinolone acetonide 40 mg/mL  Patient tolerance:  Patient tolerated the procedure well with no immediate complications  Large Joint Aspiration/Injection: L knee    Date/Time: 4/14/2025 1:45 PM    Performed by: Ladarius Daniel PA  Authorized by: Ladarius Daniel PA    Consent Done?:  Yes (Verbal)  Indications:  Pain  Site marked: the procedure site was marked    Timeout: prior to procedure the correct patient, procedure, and site was verified    Prep: patient was prepped and draped in usual sterile fashion      Local anesthesia used?: Yes    Local anesthetic:  Lidocaine 1% without epinephrine    Details:  Needle Size:  25 G  Ultrasonic Guidance for needle placement?: No    Location:  Knee  Site:  L knee  Medications:  40 mg triamcinolone acetonide 40 mg/mL  Patient tolerance:  Patient tolerated the procedure well with no immediate complications

## 2025-04-17 ENCOUNTER — LAB VISIT (OUTPATIENT)
Dept: LAB | Facility: HOSPITAL | Age: 75
End: 2025-04-17
Attending: INTERNAL MEDICINE
Payer: MEDICARE

## 2025-04-17 DIAGNOSIS — E53.8 DEFICIENCY OF OTHER SPECIFIED B GROUP VITAMINS: ICD-10-CM

## 2025-04-17 DIAGNOSIS — I26.99 ACUTE PULMONARY EMBOLISM, UNSPECIFIED PULMONARY EMBOLISM TYPE, UNSPECIFIED WHETHER ACUTE COR PULMONALE PRESENT: ICD-10-CM

## 2025-04-17 DIAGNOSIS — I82.890 ACUTE EMBOLISM AND THROMBOSIS OF OTHER SPECIFIED VEINS: ICD-10-CM

## 2025-04-17 DIAGNOSIS — I26.99 OTHER ACUTE PULMONARY EMBOLISM, UNSPECIFIED WHETHER ACUTE COR PULMONALE PRESENT: ICD-10-CM

## 2025-04-17 PROCEDURE — 85302 CLOT INHIBIT PROT C ANTIGEN: CPT

## 2025-04-17 PROCEDURE — 36415 COLL VENOUS BLD VENIPUNCTURE: CPT

## 2025-04-17 PROCEDURE — 81241 F5 GENE: CPT

## 2025-04-17 PROCEDURE — 85240 CLOT FACTOR VIII AHG 1 STAGE: CPT

## 2025-04-17 PROCEDURE — 85260 CLOT FACTOR X STUART-POWER: CPT

## 2025-04-17 PROCEDURE — 86147 CARDIOLIPIN ANTIBODY EA IG: CPT | Mod: 59

## 2025-04-17 PROCEDURE — 85613 RUSSELL VIPER VENOM DILUTED: CPT

## 2025-04-17 PROCEDURE — 85306 CLOT INHIBIT PROT S FREE: CPT

## 2025-04-17 PROCEDURE — 85250 CLOT FACTOR IX PTC/CHRSTMAS: CPT

## 2025-04-17 PROCEDURE — 85280 CLOT FACTOR XII HAGEMAN: CPT

## 2025-04-17 PROCEDURE — 85307 ASSAY ACTIVATED PROTEIN C: CPT

## 2025-04-17 PROCEDURE — 86148 ANTI-PHOSPHOLIPID ANTIBODY: CPT | Mod: 59

## 2025-04-17 PROCEDURE — 83090 ASSAY OF HOMOCYSTEINE: CPT

## 2025-04-17 PROCEDURE — 85303 CLOT INHIBIT PROT C ACTIVITY: CPT

## 2025-04-17 PROCEDURE — 85230 CLOT FACTOR VII PROCONVERTIN: CPT

## 2025-04-20 ENCOUNTER — RESULTS FOLLOW-UP (OUTPATIENT)
Dept: FAMILY MEDICINE | Facility: CLINIC | Age: 75
End: 2025-04-20

## 2025-04-20 LAB — HCYS SERPL-SCNC: 10.2 UMOL/L (ref 0–19.2)

## 2025-04-21 LAB
APCR PPP: 2.9 RATIO (ref 2.2–3.5)
FACT VIII ACT/NOR PPP: 171 % (ref 56–140)
FACT X ACT/NOR PPP: 111 % (ref 76–183)
LA 2 SCREEN W REFLEX-IMP: NORMAL
SCREEN APTT: 35.5 SEC (ref 0–43.5)
SCREEN DRVVT: 44.3 SEC (ref 0–47)

## 2025-04-21 PROCEDURE — 85305 CLOT INHIBIT PROT S TOTAL: CPT | Performed by: INTERNAL MEDICINE

## 2025-04-21 PROCEDURE — 85306 CLOT INHIBIT PROT S FREE: CPT

## 2025-04-22 LAB
CARDIOLIPIN IGA SER IA-ACNC: <9 APL U/ML (ref 0–11)
CARDIOLIPIN IGG SER IA-ACNC: <9 GPL U/ML (ref 0–14)
CARDIOLIPIN IGM SER IA-ACNC: 10 MPL U/ML (ref 0–12)
PROT S ACT/NOR PPP: 90 % (ref 63–140)
PS IGA SER-ACNC: <1 APS UNITS (ref 0–19)
PS IGG SER-ACNC: 9 UNITS (ref 0–30)
PS IGM SER-ACNC: 25 UNITS (ref 0–30)

## 2025-04-23 ENCOUNTER — TELEPHONE (OUTPATIENT)
Dept: FAMILY MEDICINE | Facility: CLINIC | Age: 75
End: 2025-04-23
Payer: MEDICARE

## 2025-04-23 LAB — PROT C AG ACT/NOR PPP IA: 107 % (ref 60–150)

## 2025-04-23 NOTE — TELEPHONE ENCOUNTER
----- Message from Rubens Tinajero MD sent at 4/1/2025  9:45 PM CDT -----  NORMAL followup as needed.  ----- Message -----  From: Interface, Rad Results In  Sent: 4/1/2025   9:05 AM CDT  To: Rubens Tinajero III, MD

## 2025-04-24 ENCOUNTER — TELEPHONE (OUTPATIENT)
Facility: CLINIC | Age: 75
End: 2025-04-24
Payer: MEDICARE

## 2025-04-24 NOTE — TELEPHONE ENCOUNTER
----- Message from Hannah sent at 4/24/2025 11:17 AM CDT -----  Pt is concerned about Factor 8 is extremely high like almost off the chart  [phone]

## 2025-04-24 NOTE — TELEPHONE ENCOUNTER
Factor 8 lab result reviewed with Dr Wilcox who was also made aware that patient still has multiple labs that remain in process. He states he will need rest of results and will review all lab results with patient at upcoming appt on 5/6. Spoke to patient made aware, verbalized understanding.

## 2025-04-25 LAB
F5 P.R506Q BLD/T QL: NORMAL
FACT VII AG ACT/NOR PPP IA: 157 %
IMP & REVIEW OF LAB RESULTS: NORMAL
LABCORP MISCELLANEOUS TEST RESULT: NORMAL

## 2025-05-05 NOTE — PROGRESS NOTES
"[facility]  PROGRESS NOTE - 2nd Follow-up Visit      Subjective:       Patient ID:   NAME: Olivia Mix : [id number]     75 y.o. female    Referring Doc: Milena Cobian F*/Tanvi  Other Physicians: Ladarius Daniel/Jani; Vicki simmons; Alber NP (GI)         Chief Complaint: Pulm emboli f/u      History of Present Illness:     Patient returns today for a 2nd regularly scheduled follow-up visit.  The patient is here today to go over the results of the recently ordered labs, tests and studies. She is here by herself.    She is doing well and feels "great"; breathing ok, no CP, SOB, HA's or N/V    She is on eliquis. No excessive bleeding or bruising, except for some bruising around the hip incision site    She saw Ladarius FERNANDO on 2025 with ortho and had an injection in her knee.    She saw Dr Simmons with dermatology on 2025    She is planning to see Dr Kang for dental cleaning  in near future        ROS:   GEN: normal without any fever, night sweats or weight loss  HEENT: normal with no HA's, sore throat, stiff neck, changes in vision  CV: normal with no CP, SOB, PND, ELI or orthopnea  PULM: normal with no SOB, cough, hemoptysis, sputum or pleuritic pain  GI: normal with no abdominal pain, nausea, vomiting, constipation, diarrhea, melanotic stools, BRBPR, or hematemesis  : normal with no hematuria, dysuria  BREAST: normal with no mass, discharge, pain  SKIN: normal with no rash, erythema, bruising, or swelling; some bruising around the prior left hip surgical scar    Pain Scale: chronic knees     Allergies:  Review of patient's allergies indicates:  No Known Allergies    Medications:  Current Medications[1]    PMHx/PSHx Updates:  See patient's last visit with me on 3/28/2025.  See H&P on 3/28/2025        Pathology:   Cancer Staging   No matching staging information was found for the patient.            Objective:     Vitals:  Blood pressure 126/72, pulse (!) 54, temperature " "97.4 °F (36.3 °C), temperature source Temporal, resp. rate 16, height 5' 6" (1.676 m), weight 61.2 kg (135 lb), SpO2 95%.    Physical Examination:   GEN: no apparent distress, comfortable; AAOx3; very fit looking   HEAD: atraumatic and normocephalic  EYES: no pallor, no icterus, PERRLA  ENT: OMM, no pharyngeal erythema, external ears WNL; no nasal discharge; no thrush  NECK: no masses, thyroid normal, trachea midline, no LAD/LN's, supple  CV: RRR with no murmur; normal pulse; normal S1 and S2; no pedal edema  CHEST: Normal respiratory effort; CTAB; normal breath sounds; no wheeze or crackles  ABDOM: nontender and nondistended; soft; normal bowel sounds; no rebound/guarding  MUSC/Skeletal: ROM normal; no crepitus; joints normal; no deformities or arthropathy; s/p left hip surgery; resolving bruise at scar site with circular/oval pattern - no erythema or drainage  EXTREM: no clubbing, cyanosis, inflammation or swelling  SKIN: no rashes, lesions, ulcers, petechiae or subcutaneous nodules  : no botello  NEURO: grossly intact; motor/sensory WNL; AAOx3; no tremors  PSYCH: normal mood, affect and behavior  LYMPH: normal cervical, supraclavicular, axillary and groin LN's                Labs:     Lab Results   Component Value Date    WBC 7.80 03/12/2025    HGB 12.7 03/12/2025    HCT 39.4 03/12/2025    MCV 86 03/12/2025     03/12/2025       CMP  Sodium   Date Value Ref Range Status   03/12/2025 138 136 - 145 mmol/L Final   12/26/2018 141 134 - 144 mmol/L      Potassium   Date Value Ref Range Status   03/12/2025 4.2 3.5 - 5.1 mmol/L Final     Chloride   Date Value Ref Range Status   03/12/2025 103 95 - 110 mmol/L Final   12/26/2018 105 98 - 110 mmol/L      CO2   Date Value Ref Range Status   03/12/2025 27 23 - 29 mmol/L Final     Glucose   Date Value Ref Range Status   03/12/2025 96 70 - 110 mg/dL Final   12/26/2018 97 70 - 99 mg/dL      BUN   Date Value Ref Range Status   03/12/2025 19 8 - 23 mg/dL Final     Creatinine "   Date Value Ref Range Status   03/12/2025 0.7 0.5 - 1.4 mg/dL Final   12/26/2018 0.65 0.60 - 1.40 mg/dL      Calcium   Date Value Ref Range Status   03/12/2025 10.1 8.7 - 10.5 mg/dL Final     Total Protein   Date Value Ref Range Status   03/12/2025 7.6 6.0 - 8.4 g/dL Final     Albumin   Date Value Ref Range Status   03/12/2025 4.6 3.5 - 5.2 g/dL Final   12/26/2018 3.6 3.1 - 4.7 g/dL      Total Bilirubin   Date Value Ref Range Status   03/12/2025 0.3 0.1 - 1.0 mg/dL Final     Comment:     For infants and newborns, interpretation of results should be based  on gestational age, weight and in agreement with clinical  observations.    Premature Infant recommended reference ranges:  Up to 24 hours.............<8.0 mg/dL  Up to 48 hours............<12.0 mg/dL  3-5 days..................<15.0 mg/dL  6-29 days.................<15.0 mg/dL       Alkaline Phosphatase   Date Value Ref Range Status   03/12/2025 64 55 - 135 U/L Final     AST   Date Value Ref Range Status   03/12/2025 25 10 - 40 U/L Final     ALT   Date Value Ref Range Status   03/12/2025 18 10 - 44 U/L Final     Anion Gap   Date Value Ref Range Status   03/12/2025 8 8 - 16 mmol/L Final     eGFR   Date Value Ref Range Status   03/12/2025 >60.0 >60 mL/min/1.73 m^2 Final   03/11/2025 76 > OR = 60 mL/min/1.73m2 Final     Anticardiolipin IgG <9     Anticardiolipin IgM 10     Anticardiolipin IgA <9     Factor V Leiden Comment   Comment: Result: c.1601G>A ([id number]) - Not Detected     Antiphosphatidylserine IgM 25   Antiphosphatidylserine IgA <1   Antiphosphatidylserine IgG 9     Activated Protein C Resistance 2.9     Homocyst(e)ine 10.2     Factor VIII Activity 171 High      PTT Lupus Anticoagulant 35.5   dRVVT 44.3   Interpretation Comment:   Comment: No lupus anticoagulant was detected.     Radiology/Diagnostic Studies:    US abdom 4/1/2025:    Impression:     1. No acute findings throughout the abdomen.  2. Bilateral simple renal cysts, incidentally noted.            US doppler 3/12/2025:  Impression:     No evidence of deep venous thrombosis in either lower extremity.        CTA 3/12/2025:   Impression:     1. Positive for small left lower lobe pulmonary thromboembolism.  Attempts are currently being made to reach the ordering provider with these findings.  2. Focal left lower lobe bronchiolitis, with additional scattered nonspecific lower lobe interstitial opacities suggesting subsegmental atelectasis and or small airways inflammation.     Mammo 8/23/2024:  Impression:     Negative  mammogram. Annual screening mammography is recommended.     BI-RADS CATEGORY 1: NEGATIVE.          I have reviewed all available lab results and radiology reports.    Assessment/Plan:   (1) 75 y.o. female  with diagnosis of pulmonary emboli who has been referred by Milena Cobian F* for evaluation by medical hematology/oncology. She had a bout of acute SOB and chest discomfort at night when laying down. She saw her primary and her D-dimer was found to be  elevated. She had a CTA on 3/12/2025 which was positive for a small left lower lobe pulmonary thromboembolism. Doppler studies of bilateral lower extremities were negative for any DVT's. She is on eliquis at standard dose currently. She denies any excessive bleeding or bruising.      She reports she had a left hip surgery about 14months with Dr Gunter with no problems after the surgery except for some incision site scar tissue.     5/6/2025:  - factor V leiden negative  - factor VIII with only a mild increase from normal which is not significant enough  - APC-R WNL  - LA negative  - anticardiolipin  and antiphos were negative  - homocysteine was WNL  - awaiting the prothrombin II, ATIII and MTHFR studies  - if they return as normal then expect she will be to discontinue the eliquis after 6 months total   - no need to repeat CTA if she remains asymptomatic         (2) Hypercholesterolemia     (3) Hypothyroidism     (4) Osteoarthritis  in bilat knees; hx/of left hip replacement surgery in Dec 2023     (5) Hx/of recurrent UTI        VISIT DIAGNOSES:      Other acute pulmonary embolism, unspecified whether acute cor pulmonale present    Aspirin long-term use          PLAN:  1. Await the remaining studies ordered for the clot workup  2. Continue eliquis for at least a total of 6 months  3. Refill eliquis for 6 months   4. Mammo yearly  5. F/u with PCP, etc  6. Should be ok to have dental cleaning but with precautions since she is on blood thinners     RTC in  3 months  Fax note to Milena Cobian F*/Tanvi; Rubens Conte III, MD,     Discussion:     Anticoagulation Discussion:     Discussed with patient and any applicable family members about the benefit and/or need for anticoagulation. Communicated about the risks of bleeding while on any anticoagulation, which could be serious and/or life-threatening, and which can occur at any time, regardless of degree of the level of anticoagulation. Expressed the need for compliance with any anticoagulation regimen and that failure to do so could potential lead to excessive bleeding, and risk to health and/or life. In particular, with patients on coumadin therapy, compliance with requested blood work is absolutely essential, as coumadin levels can vary from time to time, and failure to do so could potentially place the patient at risk for bleeding and/or clotting events which could be fatal. Patients on coumadin are encouraged to call the day after they have their levels drawn, as to obtain the appropriate instructions from our staff. Patients are aware that self-regulating or self-dosing of their medications is strictly prohibited.       I reviewed results of the recently ordered labs, tests and studies; made directives with regards to the results. I have explained all of the above in detail and the patient understands all of the current recommendation(s). I have answered all of their questions  to the best of my ability and to their complete satisfaction. The patient is to continue with the current management plan.            Electronically signed by Cory Wilcox MD               [1]   Current Outpatient Medications:     apixaban (ELIQUIS) 5 mg Tab, Take 1 tablet (5 mg total) by mouth 2 (two) times daily., Disp: 180 tablet, Rfl: 0    atorvastatin (LIPITOR) 20 MG tablet, Take 1 tablet (20 mg total) by mouth once daily., Disp: 90 tablet, Rfl: 1    levothyroxine (SYNTHROID) 100 MCG tablet, Take one tab 6 days a week, take 1/2 tab one day a week, Disp: 90 tablet, Rfl: 2    co-enzyme Q-10 30 mg capsule, Take 30 mg by mouth 3 (three) times a week. (Patient not taking: Reported on 3/17/2025), Disp: , Rfl:     doxycycline (VIBRAMYCIN) 100 MG Cap, Take 1 capsule (100 mg total) by mouth every 12 (twelve) hours. (Patient not taking: Reported on 5/6/2025), Disp: 20 capsule, Rfl: 1    meloxicam (MOBIC) 15 MG tablet, Take 15 mg by mouth. (Patient not taking: Reported on 5/6/2025), Disp: , Rfl:     multivitamin (ONE DAILY MULTIVITAMIN) per tablet, Take 1 tablet by mouth once daily., Disp: , Rfl:

## 2025-05-06 ENCOUNTER — OFFICE VISIT (OUTPATIENT)
Facility: CLINIC | Age: 75
End: 2025-05-06
Payer: MEDICARE

## 2025-05-06 ENCOUNTER — TELEPHONE (OUTPATIENT)
Facility: CLINIC | Age: 75
End: 2025-05-06

## 2025-05-06 VITALS
RESPIRATION RATE: 16 BRPM | BODY MASS INDEX: 21.69 KG/M2 | HEART RATE: 54 BPM | OXYGEN SATURATION: 95 % | DIASTOLIC BLOOD PRESSURE: 72 MMHG | TEMPERATURE: 97 F | WEIGHT: 135 LBS | HEIGHT: 66 IN | SYSTOLIC BLOOD PRESSURE: 126 MMHG

## 2025-05-06 DIAGNOSIS — Z79.82 ASPIRIN LONG-TERM USE: ICD-10-CM

## 2025-05-06 DIAGNOSIS — I26.99 ACUTE PULMONARY EMBOLISM, UNSPECIFIED PULMONARY EMBOLISM TYPE, UNSPECIFIED WHETHER ACUTE COR PULMONALE PRESENT: ICD-10-CM

## 2025-05-06 DIAGNOSIS — I26.99 OTHER ACUTE PULMONARY EMBOLISM, UNSPECIFIED WHETHER ACUTE COR PULMONALE PRESENT: Primary | ICD-10-CM

## 2025-05-06 PROCEDURE — 3288F FALL RISK ASSESSMENT DOCD: CPT | Mod: CPTII,S$GLB,, | Performed by: INTERNAL MEDICINE

## 2025-05-06 PROCEDURE — 99999 PR PBB SHADOW E&M-EST. PATIENT-LVL III: CPT | Mod: PBBFAC,,, | Performed by: INTERNAL MEDICINE

## 2025-05-06 PROCEDURE — 1126F AMNT PAIN NOTED NONE PRSNT: CPT | Mod: CPTII,S$GLB,, | Performed by: INTERNAL MEDICINE

## 2025-05-06 PROCEDURE — 1159F MED LIST DOCD IN RCRD: CPT | Mod: CPTII,S$GLB,, | Performed by: INTERNAL MEDICINE

## 2025-05-06 PROCEDURE — 99214 OFFICE O/P EST MOD 30 MIN: CPT | Mod: S$GLB,,, | Performed by: INTERNAL MEDICINE

## 2025-05-06 PROCEDURE — G2211 COMPLEX E/M VISIT ADD ON: HCPCS | Mod: S$GLB,,, | Performed by: INTERNAL MEDICINE

## 2025-05-06 PROCEDURE — 1160F RVW MEDS BY RX/DR IN RCRD: CPT | Mod: CPTII,S$GLB,, | Performed by: INTERNAL MEDICINE

## 2025-05-06 PROCEDURE — 3074F SYST BP LT 130 MM HG: CPT | Mod: CPTII,S$GLB,, | Performed by: INTERNAL MEDICINE

## 2025-05-06 PROCEDURE — 3078F DIAST BP <80 MM HG: CPT | Mod: CPTII,S$GLB,, | Performed by: INTERNAL MEDICINE

## 2025-05-06 PROCEDURE — 1101F PT FALLS ASSESS-DOCD LE1/YR: CPT | Mod: CPTII,S$GLB,, | Performed by: INTERNAL MEDICINE

## 2025-05-06 NOTE — TELEPHONE ENCOUNTER
Spoke to Sunni at [facility] main lab, inquired about labs that are still pending, AT 3, prothombin 2 and MTHFR, to see if she had any update on when these results would be available as they were drawn on 4/17. She states she will need to call [facility] as these were sendout labs to check the status and will call me back once she has update.

## 2025-05-08 ENCOUNTER — TELEPHONE (OUTPATIENT)
Dept: SURGERY | Facility: CLINIC | Age: 75
End: 2025-05-08
Payer: MEDICARE

## 2025-05-08 NOTE — TELEPHONE ENCOUNTER
Called patient back about her deflated saline implant.  She wanted Dr. Frost to remove and replace her deflated implant.  I advised patient that she would need to go to a plastic surgeon for that - we do not do augmentation.  She has not had breast cancer or anything but wanted to know if Dr. Frost would be able to do this for her.  Advised her to find someone in [facility] if she wanted to stay on [facility].

## 2025-05-09 ENCOUNTER — TELEPHONE (OUTPATIENT)
Dept: FAMILY MEDICINE | Facility: CLINIC | Age: 75
End: 2025-05-09
Payer: MEDICARE

## 2025-05-09 ENCOUNTER — TELEPHONE (OUTPATIENT)
Facility: CLINIC | Age: 75
End: 2025-05-09
Payer: MEDICARE

## 2025-05-09 NOTE — TELEPHONE ENCOUNTER
----- Message from [employer] sent at 5/9/2025 11:05 AM CDT -----  Type:  Patient Returning CallWho Called:  Isaac Thomas Message for Patient:  RenettaShavonne the patient know what this is regarding?:  call back Best Call Back Number:  [phone] Information:  damaso is returning call , please call  to further assist thank you .

## 2025-05-09 NOTE — TELEPHONE ENCOUNTER
----- Message from Cory Wilcox MD sent at 5/8/2025  6:35 PM CDT -----  What are the results  ?  ----- Message -----  From: Lab, Background User  Sent: 4/20/2025   7:09 AM CDT  To: Cory Wilcox MD

## 2025-05-09 NOTE — TELEPHONE ENCOUNTER
----- Message from Magdalene sent at 5/8/2025  3:49 PM CDT -----  Contact: self  Type: Needs Medical AdviceWho Called:  35 yrs ago had salene breast implants put in and yesterday the right implant busted and is flat now.  Dr Tinajero just put her on blood thinners and she not sure who she needs to get in with really needs some directions.  Symptoms (please be specific):  please call back to give her your advise and if you know who  she should be seen by. Best Call Back Number: [phone] Information: call back and thanks

## 2025-05-12 ENCOUNTER — TELEPHONE (OUTPATIENT)
Dept: FAMILY MEDICINE | Facility: CLINIC | Age: 75
End: 2025-05-12
Payer: MEDICARE

## 2025-05-12 ENCOUNTER — TELEPHONE (OUTPATIENT)
Facility: CLINIC | Age: 75
End: 2025-05-12
Payer: MEDICARE

## 2025-05-12 DIAGNOSIS — Z15.89 MTHFR GENE MUTATION: Primary | ICD-10-CM

## 2025-05-12 NOTE — TELEPHONE ENCOUNTER
Labs reviewed by Dr Wilcox who noted that patient MTHFR + and should start folbic. Attempted to call patient to make aware, no answer, LVM to return my call to discuss. Script for folbic pended to Jenn to be sent to [facility].

## 2025-05-12 NOTE — TELEPHONE ENCOUNTER
Patient made aware of + MTHFR results and need to start folbic, verbalized understanding. While on phone patient made me aware that she has a ruptured breast implant and will be scheduling a consultation with plastics to have this addressed but wanted to know how that works when she is on blood thinners as she will be in need of a procedure to address this. I informed her that Dr Wilcox does not provide clearances of any kind but if she has the plastic surgeon fax over info on the procedure she will need to have done that Dr Wilcox can review this and provide his recs concerning her blood thinners. I also made her aware that Dr Wilcox is out of the office for the next 2 weeks as well. Verbalized understanding.

## 2025-05-15 ENCOUNTER — TELEPHONE (OUTPATIENT)
Facility: CLINIC | Age: 75
End: 2025-05-15
Payer: MEDICARE

## 2025-05-15 NOTE — TELEPHONE ENCOUNTER
----- Message from Olimpia sent at 5/15/2025 11:45 AM CDT -----    ----- Message -----  From: Olimpia Salazar  Sent: 5/15/2025  11:44 AM CDT  To: Mora Mcpherson, RN    [facility], regarding this pt and if she can have her teeth cleaned or any other dental work while on certain medicationsPrivate line [phone] was Magda

## 2025-05-15 NOTE — TELEPHONE ENCOUNTER
Attempted to return call regarding above and to confirm the medication in question, no answer. Voicemail left with instructions to call back.

## 2025-05-15 NOTE — TELEPHONE ENCOUNTER
Magda called back - reviewed with her that if it is just a general cleaning, patient does not need to hold the Eliquis, but if it is a deep cleaning or any kind of work, they would need to call for recommendations. Magda verbalized understanding of above.

## 2025-05-17 DIAGNOSIS — E03.9 HYPOTHYROIDISM, UNSPECIFIED TYPE: ICD-10-CM

## 2025-05-17 NOTE — TELEPHONE ENCOUNTER
No care due was identified.  Health [facility] Embedded Care Due Messages. Reference number: [id number].   5/17/2025 12:04:23 PM CDT

## 2025-05-18 RX ORDER — LEVOTHYROXINE SODIUM 100 UG/1
TABLET ORAL
Qty: 90 TABLET | Refills: 0 | Status: SHIPPED | OUTPATIENT
Start: 2025-05-18

## 2025-05-25 DIAGNOSIS — E78.5 HYPERLIPIDEMIA, UNSPECIFIED HYPERLIPIDEMIA TYPE: ICD-10-CM

## 2025-05-25 RX ORDER — ATORVASTATIN CALCIUM 20 MG/1
20 TABLET, FILM COATED ORAL
Qty: 90 TABLET | Refills: 1 | Status: SHIPPED | OUTPATIENT
Start: 2025-05-25

## 2025-05-25 NOTE — TELEPHONE ENCOUNTER
No care due was identified.  [facility] Embedded Care Due Messages. Reference number: [id number].   5/25/2025 1:09:42 PM CDT

## 2025-06-04 ENCOUNTER — TELEPHONE (OUTPATIENT)
Facility: CLINIC | Age: 75
End: 2025-06-04
Payer: MEDICARE

## 2025-06-05 ENCOUNTER — TELEPHONE (OUTPATIENT)
Facility: CLINIC | Age: 75
End: 2025-06-05
Payer: MEDICARE

## 2025-06-05 DIAGNOSIS — Z15.89 MTHFR GENE MUTATION: Primary | ICD-10-CM

## 2025-06-12 ENCOUNTER — TELEPHONE (OUTPATIENT)
Facility: CLINIC | Age: 75
End: 2025-06-12
Payer: MEDICARE

## 2025-06-12 NOTE — TELEPHONE ENCOUNTER
Attempted to return call to discuss surgical recs concerning upcoming breast implant removal procedure, no answer, VM not set up.

## 2025-06-12 NOTE — TELEPHONE ENCOUNTER
----- Message from Hannah sent at 6/12/2025 11:15 AM CDT -----  Pt returning your call  [phone]

## 2025-06-12 NOTE — TELEPHONE ENCOUNTER
Request for clearance for upcoming breast implant removal procedure received from Dr Fam's office, reviewed with Dr Wilcox who provided his recommendations to hold eliquis 2 days prior to procedure and restart eliquis the day after the procedure if there are no bleeding complications which should be assessed for by surgeon. Attempted to call patient with above recs, no answer, LVM. Recs faxed to Dr Fam.

## 2025-06-12 NOTE — TELEPHONE ENCOUNTER
Patient returned my call and made aware of surgical recs concerning her upcoming implant removal. Verbalized understanding and states she is aware of recs as she spoke to surgeons office who received recs that I faxed today.

## 2025-06-23 ENCOUNTER — OFFICE VISIT (OUTPATIENT)
Dept: FAMILY MEDICINE | Facility: CLINIC | Age: 75
End: 2025-06-23
Payer: MEDICARE

## 2025-06-23 VITALS
WEIGHT: 138.75 LBS | HEART RATE: 82 BPM | SYSTOLIC BLOOD PRESSURE: 118 MMHG | TEMPERATURE: 98 F | DIASTOLIC BLOOD PRESSURE: 66 MMHG | BODY MASS INDEX: 22.4 KG/M2 | OXYGEN SATURATION: 99 %

## 2025-06-23 DIAGNOSIS — Z79.01 CURRENT USE OF LONG TERM ANTICOAGULATION: ICD-10-CM

## 2025-06-23 DIAGNOSIS — T81.41XA INFECTION OF SUPERFICIAL INCISIONAL SURGICAL SITE AFTER PROCEDURE, INITIAL ENCOUNTER: ICD-10-CM

## 2025-06-23 DIAGNOSIS — E03.9 HYPOTHYROIDISM, UNSPECIFIED TYPE: ICD-10-CM

## 2025-06-23 DIAGNOSIS — Z86.711 HISTORY OF PULMONARY EMBOLISM: Primary | ICD-10-CM

## 2025-06-23 DIAGNOSIS — Z98.86: ICD-10-CM

## 2025-06-23 PROCEDURE — 99214 OFFICE O/P EST MOD 30 MIN: CPT | Mod: S$GLB,,, | Performed by: NURSE PRACTITIONER

## 2025-06-23 PROCEDURE — 1159F MED LIST DOCD IN RCRD: CPT | Mod: CPTII,S$GLB,, | Performed by: NURSE PRACTITIONER

## 2025-06-23 PROCEDURE — 3078F DIAST BP <80 MM HG: CPT | Mod: CPTII,S$GLB,, | Performed by: NURSE PRACTITIONER

## 2025-06-23 PROCEDURE — 3288F FALL RISK ASSESSMENT DOCD: CPT | Mod: CPTII,S$GLB,, | Performed by: NURSE PRACTITIONER

## 2025-06-23 PROCEDURE — 1126F AMNT PAIN NOTED NONE PRSNT: CPT | Mod: CPTII,S$GLB,, | Performed by: NURSE PRACTITIONER

## 2025-06-23 PROCEDURE — 3074F SYST BP LT 130 MM HG: CPT | Mod: CPTII,S$GLB,, | Performed by: NURSE PRACTITIONER

## 2025-06-23 PROCEDURE — 99999 PR PBB SHADOW E&M-EST. PATIENT-LVL III: CPT | Mod: PBBFAC,,, | Performed by: NURSE PRACTITIONER

## 2025-06-23 PROCEDURE — 1101F PT FALLS ASSESS-DOCD LE1/YR: CPT | Mod: CPTII,S$GLB,, | Performed by: NURSE PRACTITIONER

## 2025-06-23 RX ORDER — DOXYCYCLINE 100 MG/1
100 CAPSULE ORAL EVERY 12 HOURS
Start: 2025-06-23

## 2025-06-23 NOTE — PROGRESS NOTES
Subjective:       Patient ID: Olivia Mix is a 75 y.o. female.    Chief Complaint: Follow-up (3 month follow up )    History of Present Illness    CHIEF COMPLAINT:  Ms. Mix presents today for follow up of anticoagulation therapy. Had unprovoked PE 3 months ago.     ANTICOAGULATION MANAGEMENT:  She is currently taking Eliquis twice daily for PE and follows with hematologist Dr. Wilcox, with next appointment in August. She experienced an initial severe nosebleed during the first week of treatment and a minor nosebleed yesterday triggered by sneezing. She reports improvement in bruising, noting her arms look better than before. She expresses apprehension about potential bleeding risks while on Eliquis, particularly concerning minor injuries. She denies current abdominal pain or significant bleeding complications. She hopes to discuss possibility of discontinuing or reducing anticoagulation therapy during her August follow-up.    RECENT SURGERY:  She underwent surgical removal of both breast implants last week and associated sacs last Thursday following spontaneous rupture of right breast implant. She is currently taking doxycycline 10-day course with a strong probiotic as prescribed by surgeon. She reports good recovery and has a pending follow-up visit to assess surgical incision.    NUTRITION:  She reports ongoing vitamin B deficiency despite supplementation. Was give Folbic by dr. Wilcox. She has made dietary modifications including switching from almond milk to 1% skim milk and increasing animal protein intake (eggs, turkey, and meatballs) to improve vitamin B levels. Follow-up labs is scheduled for August to assess improvement.    Portions of this note were generated with the assistance of ambient listening technology.      ROS:  ENT: +nosebleeds       Review of patient's allergies indicates:  No Known Allergies  Social Drivers of Health     Tobacco Use: Low Risk  (5/6/2025)    Patient History      Smoking Tobacco Use: Never     Smokeless Tobacco Use: Never     Passive Exposure: Not on file   Alcohol Use: Not on file   Financial Resource Strain: Not on file   Food Insecurity: Not on file   Transportation Needs: Not on file   Physical Activity: Not on file   Stress: No Stress Concern Present (6/25/2020)    [employer] - Occupational Stress Questionnaire     Feeling of Stress : Not at all   Housing Stability: Not on file   Depression: Low Risk  (5/6/2025)    Depression     Last PHQ-4: Flowsheet Data: 0   Utilities: Not on file   Health Literacy: Not on file   Social Isolation: Not on file      Past Medical History:   Diagnosis Date    Colon polyp     Hypercholesterolemia     Pulmonary emboli 03/27/2025    Ruptured right breast implant 06/23/2025    Thyroid disease       Past Surgical History:   Procedure Laterality Date    AUGMENTATION OF BREAST      breast augmentation      CHEILECTOMY      COLONOSCOPY      10 years ago    HIP REPLACEMENT ARTHROPLASTY Left 12/4/2023    Procedure: ARTHROPLASTY, HIP REPLACEMENT, LEFT;  Surgeon: Boom Gunter MD;  Location: [facility];  Service: Orthopedics;  Laterality: Left;  Scott    HYSTERECTOMY        Social History[1]    Current Medications[2]      Objective:      Vitals:    06/23/25 1355   BP: 118/66   BP Location: Left arm   Patient Position: Sitting   Pulse: 82   Temp: 97.9 °F (36.6 °C)   TempSrc: Oral   SpO2: 99%   Weight: 62.9 kg (138 lb 12.5 oz)      Physical Exam  Constitutional:       Appearance: Normal appearance.   HENT:      Head: Normocephalic and atraumatic.   Eyes:      Conjunctiva/sclera: Conjunctivae normal.   Cardiovascular:      Rate and Rhythm: Normal rate and regular rhythm.      Heart sounds: Normal heart sounds.   Pulmonary:      Effort: Pulmonary effort is normal. No respiratory distress.      Breath sounds: Normal breath sounds. No wheezing, rhonchi or rales.   Skin:     General: Skin is warm.   Neurological:      Mental  Status: She is alert and oriented to person, place, and time.   Psychiatric:         Mood and Affect: Mood normal.         Behavior: Behavior normal.         Thought Content: Thought content normal.         Judgment: Judgment normal.         Assessment:       1. History of pulmonary embolism    2. History of removal of breast implant    3. Hypothyroidism, unspecified type    4. Current use of long term anticoagulation        Plan:       Assessment & Plan    IMPRESSION:  - Reviewed current anticoagulation therapy with Eliquis for previously diagnosed blood clot.  - Noted under care of hematologist Dr. Wilcox for ongoing management of anticoagulation.  - Considered potential discontinuation of anticoagulation at 6-month liam, pending hematologist's evaluation.  - Assessed recent breast implant rupture and subsequent bilateral implant removal surgery.  - Evaluated post-op course and current antibiotic regimen.  - Considered timing for future mammogram screening in light of recent breast surgery.    Portions of this note were generated with the assistance of ambient listening technology.    PLAN SUMMARY:  - Remove chronic pancreatitis from problem list  - Continue Eliquis twice daily  - Order thyroid function test (TSH) at [facility] in 3 months  -- Consider scheduling mammogram after recovery from breast surgery  - Follow-up appointment tomorrow with Dr. Tanya Fam to assess surgical site  - Follow-up with hematologist Dr. Wilcox in August for blood clot management      CHRONIC PANCREATITIS:  - Assessed that chronic pancreatitis was incorrectly listed in the patient's medical record as it was only suspected from clinical data but never definitively diagnosed.  - Ms. Mix denies having this condition.  - Will remove from problem list.    ## BREAST IMPLANT LEAKAGE AND REMOVAL:  - Ms. Mix's right breast implant ruptured spontaneously, and surgical removal of both breast implants and sacs was performed  last Thursday, including removal of the left implant with sac.  - Ms. 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  - Scheduled for follow-up visit tomorrow with Dr. Tanya Fam to assess the surgical site and healing progress.  - Consider scheduling mammogram after complete recovery from breast surgery.    ##Hypothyroidism  -last tsh was march 10th, 2025 was 1.3  -repeat in 3 months, continue synthroid 100mcg daily  \    Olivia was seen today for follow-up.    Diagnoses and all orders for this visit:    History of pulmonary embolism    History of removal of breast implant    Hypothyroidism, unspecified type  -     TSH; Future  -     TSH    Current use of long term anticoagulation     F/u 6 months.           No follow-ups on file.    Future Appointments       Date Provider Specialty Appt Notes    8/12/2025 Cory Wilcox MD Hematology and Oncology PE12 WEEK F/U W/ LABS ([facility])            This note was generated with the assistance of ambient listening technology. Verbal consent was obtained by the patient and accompanying visitor(s) for the recording of patient appointment to facilitate this note. I attest to having reviewed and edited the generated note for accuracy, though some syntax or spelling errors may persist. Please contact the author of this note for any clarification.      Milena Cobian, FNP-C  Family Medicine         [1]   Social History  Socioeconomic History    Marital status:    [2]   Current Outpatient Medications:     apixaban (ELIQUIS) 5 mg Tab, Take 1 tablet (5 mg total) by mouth 2 (two) times daily., Disp: 180 tablet, Rfl: 3    atorvastatin (LIPITOR) 20 MG tablet, TAKE 1 TABLET(20 MG) BY MOUTH EVERY DAY, Disp: 90 tablet, Rfl: 1    co-enzyme Q-10 30 mg capsule, Take 30 mg by mouth 3 (three) times a week., Disp: , Rfl:     folic acid-vit B6-vit B12 2.5-25-2 mg (FOLBIC OR EQUIV) 2.5-25-2 mg Tab, Take 1  tablet by mouth once daily., Disp: 30 tablet, Rfl: 11    levothyroxine (SYNTHROID) 100 MCG tablet, TAKE 1 TABLET BY MOUTH 6 DAYS A WEEK THEN TAKE 1/2 TABLET BY MOUTH 1 TIME A WEEK, Disp: 90 tablet, Rfl: 0    meloxicam (MOBIC) 15 MG tablet, Take 15 mg by mouth., Disp: , Rfl:     multivitamin (ONE DAILY MULTIVITAMIN) per tablet, Take 1 tablet by mouth once daily., Disp: , Rfl:

## 2025-07-14 ENCOUNTER — TELEPHONE (OUTPATIENT)
Dept: FAMILY MEDICINE | Facility: CLINIC | Age: 75
End: 2025-07-14
Payer: MEDICARE

## 2025-07-14 NOTE — TELEPHONE ENCOUNTER
----- Message from SANDY Hermosillo sent at 7/9/2025  8:24 AM CDT -----  TSH is normal. Follow up as previously discussed.  ----- Message -----  From: Pari Gil  Sent: 7/9/2025   4:45 AM CDT  To: SANDY Laird

## 2025-07-14 NOTE — TELEPHONE ENCOUNTER
----- Message from Rubens Tinajero MD sent at 7/9/2025  8:31 PM CDT -----  UNCHANGED/STABLE  ----- Message -----  From: Pari Gil  Sent: 7/9/2025   7:00 AM CDT  To: Rubens Tinajero III, MD

## 2025-08-04 ENCOUNTER — OFFICE VISIT (OUTPATIENT)
Dept: ORTHOPEDICS | Facility: CLINIC | Age: 75
End: 2025-08-04
Payer: MEDICARE

## 2025-08-04 VITALS — HEIGHT: 66 IN | BODY MASS INDEX: 21.86 KG/M2 | WEIGHT: 136 LBS

## 2025-08-04 DIAGNOSIS — M17.12 PRIMARY OSTEOARTHRITIS OF LEFT KNEE: Primary | ICD-10-CM

## 2025-08-04 DIAGNOSIS — M17.11 PRIMARY OSTEOARTHRITIS OF RIGHT KNEE: ICD-10-CM

## 2025-08-04 PROCEDURE — 99999 PR PBB SHADOW E&M-EST. PATIENT-LVL III: CPT | Mod: PBBFAC,,, | Performed by: PHYSICIAN ASSISTANT

## 2025-08-04 PROCEDURE — 1160F RVW MEDS BY RX/DR IN RCRD: CPT | Mod: CPTII,S$GLB,, | Performed by: PHYSICIAN ASSISTANT

## 2025-08-04 PROCEDURE — 99213 OFFICE O/P EST LOW 20 MIN: CPT | Mod: 25,S$GLB,, | Performed by: PHYSICIAN ASSISTANT

## 2025-08-04 PROCEDURE — 3288F FALL RISK ASSESSMENT DOCD: CPT | Mod: CPTII,S$GLB,, | Performed by: PHYSICIAN ASSISTANT

## 2025-08-04 PROCEDURE — 1125F AMNT PAIN NOTED PAIN PRSNT: CPT | Mod: CPTII,S$GLB,, | Performed by: PHYSICIAN ASSISTANT

## 2025-08-04 PROCEDURE — 1159F MED LIST DOCD IN RCRD: CPT | Mod: CPTII,S$GLB,, | Performed by: PHYSICIAN ASSISTANT

## 2025-08-04 PROCEDURE — 20610 DRAIN/INJ JOINT/BURSA W/O US: CPT | Mod: 50,S$GLB,, | Performed by: PHYSICIAN ASSISTANT

## 2025-08-04 PROCEDURE — 1101F PT FALLS ASSESS-DOCD LE1/YR: CPT | Mod: CPTII,S$GLB,, | Performed by: PHYSICIAN ASSISTANT

## 2025-08-04 RX ORDER — TRIAMCINOLONE ACETONIDE 40 MG/ML
40 INJECTION, SUSPENSION INTRA-ARTICULAR; INTRAMUSCULAR
Status: DISCONTINUED | OUTPATIENT
Start: 2025-08-04 | End: 2025-08-04 | Stop reason: HOSPADM

## 2025-08-04 RX ADMIN — TRIAMCINOLONE ACETONIDE 40 MG: 40 INJECTION, SUSPENSION INTRA-ARTICULAR; INTRAMUSCULAR at 01:08

## 2025-08-04 NOTE — PROCEDURES
Large Joint Aspiration/Injection: R knee    Date/Time: 8/4/2025 1:30 PM    Performed by: Ladarius Daniel PA  Authorized by: Ladarius Daniel PA    Site marked: the procedure site was marked    Timeout: prior to procedure the correct patient, procedure, and site was verified    Prep: patient was prepped and draped in usual sterile fashion      Local anesthesia used?: Yes    Local anesthetic:  Lidocaine 1% without epinephrine    Details:  Needle Size:  25 G  Ultrasonic Guidance for needle placement?: No    Location:  Knee  Site:  R knee  Medications:  40 mg triamcinolone acetonide 40 mg/mL  Patient tolerance:  Patient tolerated the procedure well with no immediate complications  Large Joint Aspiration/Injection: L knee    Date/Time: 8/4/2025 1:30 PM    Performed by: Ladarius Daniel PA  Authorized by: Ladarius Daniel PA    Site marked: the procedure site was marked    Timeout: prior to procedure the correct patient, procedure, and site was verified    Prep: patient was prepped and draped in usual sterile fashion      Local anesthesia used?: Yes    Local anesthetic:  Lidocaine 1% without epinephrine    Details:  Needle Size:  25 G  Ultrasonic Guidance for needle placement?: No    Location:  Knee  Site:  L knee  Medications:  40 mg triamcinolone acetonide 40 mg/mL  Patient tolerance:  Patient tolerated the procedure well with no immediate complications

## 2025-08-04 NOTE — PROGRESS NOTES
[facility]  [address]  [place] [address]  Phone: [phone]   [phone]    Patient's PCP:Rubens Tinajero III, MD  Referring Provider: No ref. provider found    POST-OP Note:    Subjective:        Chief Complaint:   Chief Complaint   Patient presents with    Left Knee - Pain     B/L knee pain, inj 4.14.25. Had about 3 months relief with last injections, injections are not lasting as long as they used to. Would like repeat injections today. Would like to schedule R knee TKA for November, December. Currently on blood thinners. Pain in R>L.     Right Knee - Pain       Past Medical History:   Diagnosis Date    Colon polyp     Hypercholesterolemia     Pulmonary emboli 03/27/2025    Ruptured right breast implant 06/23/2025    Thyroid disease        Past Surgical History:   Procedure Laterality Date    AUGMENTATION OF BREAST      breast augmentation      CHEILECTOMY      COLONOSCOPY      10 years ago    HIP REPLACEMENT ARTHROPLASTY Left 12/4/2023    Procedure: ARTHROPLASTY, HIP REPLACEMENT, LEFT;  Surgeon: Boom Gunter MD;  Location: [facility];  Service: Orthopedics;  Laterality: Left;  [place]    HYSTERECTOMY         Current Outpatient Medications   Medication Sig    apixaban (ELIQUIS) 5 mg Tab Take 1 tablet (5 mg total) by mouth 2 (two) times daily.    atorvastatin (LIPITOR) 20 MG tablet TAKE 1 TABLET(20 MG) BY MOUTH EVERY DAY    levothyroxine (SYNTHROID) 100 MCG tablet TAKE 1 TABLET BY MOUTH 6 DAYS A WEEK THEN TAKE 1/2 TABLET BY MOUTH 1 TIME A WEEK    multivitamin (ONE DAILY MULTIVITAMIN) per tablet Take 1 tablet by mouth once daily.    co-enzyme Q-10 30 mg capsule Take 30 mg by mouth 3 (three) times a week. (Patient not taking: Reported on 8/4/2025)    doxycycline (VIBRAMYCIN) 100 MG Cap Take 1 capsule (100 mg total) by mouth every 12 (twelve) hours. (Patient not taking: Reported on 8/4/2025)    folic acid-vit B6-vit B12 2.5-25-2 mg (FOLBIC OR EQUIV) 2.5-25-2 mg Tab Take 1 tablet by  mouth once daily. (Patient not taking: Reported on 8/4/2025)    meloxicam (MOBIC) 15 MG tablet Take 15 mg by mouth. (Patient not taking: Reported on 8/4/2025)     No current facility-administered medications for this visit.       Review of patient's allergies indicates:  No Known Allergies    Family History   Problem Relation Name Age of Onset    Breast cancer Mother  82    No Known Problems Daughter      Melanoma Neg Hx      Colon cancer Neg Hx      Colon polyps Neg Hx      Cystic fibrosis Neg Hx      Esophageal cancer Neg Hx      Liver cancer Neg Hx      Rectal cancer Neg Hx      Stomach cancer Neg Hx      Ulcerative colitis Neg Hx         Social History[1]    History of present illness: 75 y.o. female returns to clinic today with known osteoarthritis in the Bilateral knee/s.  Here today for routine follow up.    Review of Systems:    Musculoskeletal:  See HPI       Objective:        Physical Examination:    Vital Signs: There were no vitals filed for this visit.    Body mass index is 21.95 kg/m².    This a well-developed, well nourished patient in no acute distress.  They are alert and oriented and cooperative to examination.        Examination of the knee, skin is dry and intact, no erythema or ecchymosis, no signs and symptoms of infection.  No effusion.  Stable anterior-posterior varus and valgus stress.  Homans sign is negative, straight leg raise is negative, distally neurovascularly intact.    Range of motion: 0-120    Pertinent New Results:         XRAY Report / Interpretation:        Procedure/s:    Large Joint Aspiration/Injection: R knee    Date/Time: 8/4/2025 1:30 PM    Performed by: Ladarius Daniel PA  Authorized by: Ladarius Daniel PA    Site marked: the procedure site was marked    Timeout: prior to procedure the correct patient, procedure, and site was verified    Prep: patient was prepped and draped in usual sterile fashion      Local anesthesia used?: Yes    Local anesthetic:  Lidocaine  1% without epinephrine    Details:  Needle Size:  25 G  Ultrasonic Guidance for needle placement?: No    Location:  Knee  Site:  R knee  Medications:  40 mg triamcinolone acetonide 40 mg/mL  Patient tolerance:  Patient tolerated the procedure well with no immediate complications  Large Joint Aspiration/Injection: L knee    Date/Time: 8/4/2025 1:30 PM    Performed by: Ladarius Daniel PA  Authorized by: Ladarius Daniel PA    Site marked: the procedure site was marked    Timeout: prior to procedure the correct patient, procedure, and site was verified    Prep: patient was prepped and draped in usual sterile fashion      Local anesthesia used?: Yes    Local anesthetic:  Lidocaine 1% without epinephrine    Details:  Needle Size:  25 G  Ultrasonic Guidance for needle placement?: No    Location:  Knee  Site:  L knee  Medications:  40 mg triamcinolone acetonide 40 mg/mL  Patient tolerance:  Patient tolerated the procedure well with no immediate complications            Assessment:       1. Primary osteoarthritis of left knee    2. Primary osteoarthritis of right knee      Plan:     Primary osteoarthritis of left knee  -     Large Joint Aspiration/Injection: L knee    Primary osteoarthritis of right knee  -     Large Joint Aspiration/Injection: R knee        Follow up in about 10 weeks (around 10/13/2025) for Tues/Thurs with Dr. Gunter, schedule R TKA, bilat knee inj 8/4/25.    Osteoarthritis knee, we injected the Right and Bilateral knee/s today, anterior lateral approach sterile technique patient tolerated well.  We went over postprocedure instructions.  Interested in joint replacement surgery later this year.  She will follow up in  or about 3 months.    Risks and benefits were discussed with patient prior to receiving injection. Depending on injection type, risks include the possibility of infection, pain, disruptions in blood pressure and blood sugar, and cosmetic deformity at site of injection.     Receiving  a steroid injection is a simple, in-office procedure.     After your injection, keep the following in mind:    In the first 48 hours after a steroid injection...    -You should be able to resume your normal day-to-day activities    -You should avoid activities that put excessive strain on your knee such as jogging, lifting or prolonged standing    -If you have any mild pain or swelling at the injection site, place an ice pack on your knee for 10 minutes or as needed for comfort    In the months after an injection...    -Everyone responds differently so when your osteoarthritis knee pain returns, schedule a follow up appointment         ADELINE Hart, OSMAN      EMR Statement:  Please note that portions of this patient encounter record were imported from the patients electronic medical record and that others were dictated using voice recognition software. For these reasons grammatical errors, nonsensical language, and apparently contradictory statements may be present.  These should be disregarded or interpreted with respect to the context of the document.         [1]   Social History  Socioeconomic History    Marital status:    Tobacco Use    Smoking status: Never    Smokeless tobacco: Never   Substance and Sexual Activity    Alcohol use: Yes     Comment: social    Drug use: Not Currently     Social Drivers of Health     Stress: No Stress Concern Present (6/25/2020)    [employer] [place] - Occupational Stress Questionnaire     Feeling of Stress : Not at all

## 2025-08-12 ENCOUNTER — OFFICE VISIT (OUTPATIENT)
Facility: CLINIC | Age: 75
End: 2025-08-12
Payer: MEDICARE

## 2025-08-12 VITALS
RESPIRATION RATE: 16 BRPM | HEIGHT: 66 IN | SYSTOLIC BLOOD PRESSURE: 149 MMHG | HEART RATE: 82 BPM | DIASTOLIC BLOOD PRESSURE: 70 MMHG | TEMPERATURE: 98 F | WEIGHT: 137 LBS | BODY MASS INDEX: 22.02 KG/M2 | OXYGEN SATURATION: 97 %

## 2025-08-12 DIAGNOSIS — I26.99 OTHER ACUTE PULMONARY EMBOLISM, UNSPECIFIED WHETHER ACUTE COR PULMONALE PRESENT: Primary | ICD-10-CM

## 2025-08-12 PROCEDURE — 1101F PT FALLS ASSESS-DOCD LE1/YR: CPT | Mod: CPTII,S$GLB,, | Performed by: INTERNAL MEDICINE

## 2025-08-12 PROCEDURE — G2211 COMPLEX E/M VISIT ADD ON: HCPCS | Mod: S$GLB,,, | Performed by: INTERNAL MEDICINE

## 2025-08-12 PROCEDURE — 99999 PR PBB SHADOW E&M-EST. PATIENT-LVL III: CPT | Mod: PBBFAC,,, | Performed by: INTERNAL MEDICINE

## 2025-08-12 PROCEDURE — 99214 OFFICE O/P EST MOD 30 MIN: CPT | Mod: S$GLB,,, | Performed by: INTERNAL MEDICINE

## 2025-08-12 PROCEDURE — 1126F AMNT PAIN NOTED NONE PRSNT: CPT | Mod: CPTII,S$GLB,, | Performed by: INTERNAL MEDICINE

## 2025-08-12 PROCEDURE — 3078F DIAST BP <80 MM HG: CPT | Mod: CPTII,S$GLB,, | Performed by: INTERNAL MEDICINE

## 2025-08-12 PROCEDURE — 1159F MED LIST DOCD IN RCRD: CPT | Mod: CPTII,S$GLB,, | Performed by: INTERNAL MEDICINE

## 2025-08-12 PROCEDURE — 3077F SYST BP >= 140 MM HG: CPT | Mod: CPTII,S$GLB,, | Performed by: INTERNAL MEDICINE

## 2025-08-12 PROCEDURE — 3288F FALL RISK ASSESSMENT DOCD: CPT | Mod: CPTII,S$GLB,, | Performed by: INTERNAL MEDICINE

## 2025-08-12 PROCEDURE — 1160F RVW MEDS BY RX/DR IN RCRD: CPT | Mod: CPTII,S$GLB,, | Performed by: INTERNAL MEDICINE

## 2025-08-25 DIAGNOSIS — E03.9 HYPOTHYROIDISM, UNSPECIFIED TYPE: ICD-10-CM

## 2025-08-25 RX ORDER — LEVOTHYROXINE SODIUM 100 UG/1
TABLET ORAL
Qty: 90 TABLET | Refills: 0 | Status: SHIPPED | OUTPATIENT
Start: 2025-08-25

## (undated) DEVICE — SPONGE BULKEE II ABSRB 6X6.75

## (undated) DEVICE — PACK CUSTOM UNIV BASIN SLI

## (undated) DEVICE — YANKAUER FLEX NO VENT HI CAP

## (undated) DEVICE — SOL NACL 0.9% INJ 250ML BG

## (undated) DEVICE — DRAPE ORTH SPLIT 77X108IN

## (undated) DEVICE — STRAP OR TABLE 5IN X 72IN

## (undated) DEVICE — SLEEVE SCD EXPRESS KNEE MEDIUM

## (undated) DEVICE — ELECTRODE BLADE TEFLON 6

## (undated) DEVICE — SYS CLSR DERMABOND PRINEO 42CM

## (undated) DEVICE — MANIFOLD 4 PORT

## (undated) DEVICE — SEALER BIPOLAR TISSUE 6.0

## (undated) DEVICE — APPLICATOR CHLORAPREP ORN 26ML

## (undated) DEVICE — PACK SIRUS BASIC V SURG STRL

## (undated) DEVICE — BLADE SAW SGTL NARROW 0.89

## (undated) DEVICE — COVER TABLE 44X90 STERILE

## (undated) DEVICE — DRAPE THREE-QTR REINF 53X77IN

## (undated) DEVICE — DRAPE STERI INSTRUMENT 1018

## (undated) DEVICE — DRAPE HIP PCH 112X137X89IN

## (undated) DEVICE — SOL NACL IRR 1000ML BTL

## (undated) DEVICE — BLADE SURG CARBON STEEL #10

## (undated) DEVICE — DRAPE INCISE IOBAN 2 23X17IN

## (undated) DEVICE — GLOVE SURG ULTRA TOUCH 8.5

## (undated) DEVICE — DRAPE INVISISHIELD TOWEL SMALL

## (undated) DEVICE — SUT MONOCRYL 3-0 PS-1

## (undated) DEVICE — SUT FIBERWIRE 2 38 IN TAPER

## (undated) DEVICE — PILLOW HIP ABDUCTION MED

## (undated) DEVICE — COVER MAYO STND XL 30X57IN

## (undated) DEVICE — ELECTRODE REM PLYHSV RETURN 9

## (undated) DEVICE — BNDG COFLEX FOAM LF2 ST 6X5YD

## (undated) DEVICE — SUT STRATAFIX PDS 1 CTX 18IN

## (undated) DEVICE — TOWEL OR DISP STRL BLUE 4/PK

## (undated) DEVICE — INTERPULSE SET

## (undated) DEVICE — DRESSING GAUZE OIL EMUL 3X8

## (undated) DEVICE — DRAPE INCISE IOBAN 2 23X33IN

## (undated) DEVICE — SOL NACL IRR 3000ML

## (undated) DEVICE — PAD ABDOMINAL STERILE 8X10IN

## (undated) DEVICE — TOGA FLYTE PEEL AWAY XLARGE

## (undated) DEVICE — GLOVE SENSICARE PI GRN 8.5

## (undated) DEVICE — ALCOHOL 70% ANTISEPTIC ISO 4OZ

## (undated) DEVICE — GOWN TOGA SYS PEELWY ZIP 2 XL

## (undated) DEVICE — DRAPE U SPLIT SHEET 54X76IN

## (undated) DEVICE — DRAPE STERI U-SHAPED 47X51IN

## (undated) DEVICE — SUT STRATAFIX SPIRAL VIOLET

## (undated) DEVICE — SOCKINETTE IMPERVIOUS 12X48IN